# Patient Record
Sex: FEMALE | Race: BLACK OR AFRICAN AMERICAN | Employment: UNEMPLOYED | URBAN - METROPOLITAN AREA
[De-identification: names, ages, dates, MRNs, and addresses within clinical notes are randomized per-mention and may not be internally consistent; named-entity substitution may affect disease eponyms.]

---

## 2019-05-09 ENCOUNTER — HOSPITAL ENCOUNTER (OUTPATIENT)
Dept: LAB | Age: 54
Discharge: HOME | End: 2019-05-09

## 2019-05-09 LAB — XX-LABCORP SPECIMEN COL,LCBCF: NORMAL

## 2019-05-09 PROCEDURE — 99001 SPECIMEN HANDLING PT-LAB: CPT

## 2019-05-22 ENCOUNTER — HOSPITAL ENCOUNTER (OUTPATIENT)
Dept: MAMMOGRAPHY | Age: 54
Discharge: HOME | End: 2019-05-22
Attending: INTERNAL MEDICINE
Payer: MEDICAID

## 2019-05-22 DIAGNOSIS — Z12.31 VISIT FOR SCREENING MAMMOGRAM: ICD-10-CM

## 2019-05-22 PROCEDURE — 77067 SCR MAMMO BI INCL CAD: CPT

## 2019-06-03 ENCOUNTER — HOSPITAL ENCOUNTER (OUTPATIENT)
Dept: MAMMOGRAPHY | Age: 54
Discharge: HOME | End: 2019-06-03
Attending: INTERNAL MEDICINE
Payer: MEDICAID

## 2019-06-03 ENCOUNTER — HOSPITAL ENCOUNTER (OUTPATIENT)
Dept: ULTRASOUND IMAGING | Age: 54
Discharge: HOME | End: 2019-06-03
Attending: INTERNAL MEDICINE
Payer: MEDICAID

## 2019-06-03 DIAGNOSIS — R92.2 INCONCLUSIVE MAMMOGRAM: ICD-10-CM

## 2019-06-03 DIAGNOSIS — R92.8 ABNORMALITY OF LEFT BREAST ON SCREENING MAMMOGRAM: ICD-10-CM

## 2019-06-03 PROCEDURE — 76642 ULTRASOUND BREAST LIMITED: CPT

## 2019-06-03 PROCEDURE — 77061 BREAST TOMOSYNTHESIS UNI: CPT

## 2019-10-07 ENCOUNTER — HOSPITAL ENCOUNTER (OUTPATIENT)
Dept: GENERAL RADIOLOGY | Age: 54
Discharge: HOME | End: 2019-10-07
Payer: MEDICAID

## 2019-10-07 DIAGNOSIS — Z87.09 HISTORY OF ASBESTOSIS: ICD-10-CM

## 2019-10-07 DIAGNOSIS — R05.9 COUGH: ICD-10-CM

## 2019-10-07 PROCEDURE — 71046 X-RAY EXAM CHEST 2 VIEWS: CPT

## 2020-06-10 ENCOUNTER — HOSPITAL ENCOUNTER (OUTPATIENT)
Dept: MAMMOGRAPHY | Age: 55
Discharge: HOME | End: 2020-06-10
Attending: INTERNAL MEDICINE
Payer: MEDICAID

## 2020-06-10 DIAGNOSIS — Z12.31 VISIT FOR SCREENING MAMMOGRAM: ICD-10-CM

## 2020-06-10 PROCEDURE — 77063 BREAST TOMOSYNTHESIS BI: CPT

## 2020-11-27 ENCOUNTER — HOSPITAL ENCOUNTER (OUTPATIENT)
Dept: LAB | Age: 55
Discharge: HOME | End: 2020-11-27

## 2020-11-27 LAB — XX-LABCORP SPECIMEN COL,LCBCF: NORMAL

## 2020-11-27 PROCEDURE — 99001 SPECIMEN HANDLING PT-LAB: CPT

## 2021-03-24 ENCOUNTER — HOSPITAL ENCOUNTER (OUTPATIENT)
Dept: LAB | Age: 56
Discharge: HOME | End: 2021-03-24
Payer: MEDICAID

## 2021-03-24 LAB
ALBUMIN SERPL-MCNC: 3.2 G/DL (ref 3.4–5)
ALBUMIN/GLOB SERPL: 0.8 {RATIO} (ref 0.8–1.7)
ALP SERPL-CCNC: 113 U/L (ref 45–117)
ALT SERPL-CCNC: 54 U/L (ref 13–56)
ANION GAP SERPL CALC-SCNC: 10 MMOL/L (ref 3–18)
AST SERPL-CCNC: 58 U/L (ref 10–38)
BASOPHILS # BLD: 0 K/UL (ref 0–0.1)
BASOPHILS NFR BLD: 1 % (ref 0–2)
BILIRUB SERPL-MCNC: 0.4 MG/DL (ref 0.2–1)
BUN SERPL-MCNC: 7 MG/DL (ref 7–18)
BUN/CREAT SERPL: 9 (ref 12–20)
CALCIUM SERPL-MCNC: 8.5 MG/DL (ref 8.5–10.1)
CHLORIDE SERPL-SCNC: 107 MMOL/L (ref 100–111)
CHOLEST SERPL-MCNC: 112 MG/DL
CO2 SERPL-SCNC: 25 MMOL/L (ref 21–32)
CREAT SERPL-MCNC: 0.74 MG/DL (ref 0.6–1.3)
DIFFERENTIAL METHOD BLD: ABNORMAL
EOSINOPHIL # BLD: 0.1 K/UL (ref 0–0.4)
EOSINOPHIL NFR BLD: 3 % (ref 0–5)
ERYTHROCYTE [DISTWIDTH] IN BLOOD BY AUTOMATED COUNT: 14.2 % (ref 11.6–14.5)
GLOBULIN SER CALC-MCNC: 3.8 G/DL (ref 2–4)
GLUCOSE SERPL-MCNC: 107 MG/DL (ref 74–99)
HCT VFR BLD AUTO: 35.3 % (ref 35–45)
HDLC SERPL-MCNC: 28 MG/DL (ref 40–60)
HDLC SERPL: 4 {RATIO} (ref 0–5)
HGB BLD-MCNC: 11.8 G/DL (ref 12–16)
LDLC SERPL CALC-MCNC: 42.2 MG/DL (ref 0–100)
LIPID PROFILE,FLP: ABNORMAL
LYMPHOCYTES # BLD: 1.1 K/UL (ref 0.9–3.6)
LYMPHOCYTES NFR BLD: 34 % (ref 21–52)
MCH RBC QN AUTO: 28.1 PG (ref 24–34)
MCHC RBC AUTO-ENTMCNC: 33.4 G/DL (ref 31–37)
MCV RBC AUTO: 84 FL (ref 74–97)
MONOCYTES # BLD: 0.6 K/UL (ref 0.05–1.2)
MONOCYTES NFR BLD: 18 % (ref 3–10)
NEUTS SEG # BLD: 1.5 K/UL (ref 1.8–8)
NEUTS SEG NFR BLD: 44 % (ref 40–73)
PLATELET # BLD AUTO: 176 K/UL (ref 135–420)
PMV BLD AUTO: 9.5 FL (ref 9.2–11.8)
POTASSIUM SERPL-SCNC: 4.2 MMOL/L (ref 3.5–5.5)
PROT SERPL-MCNC: 7 G/DL (ref 6.4–8.2)
RBC # BLD AUTO: 4.2 M/UL (ref 4.2–5.3)
SODIUM SERPL-SCNC: 142 MMOL/L (ref 136–145)
TRIGL SERPL-MCNC: 209 MG/DL (ref ?–150)
TSH SERPL DL<=0.05 MIU/L-ACNC: 1 UIU/ML (ref 0.36–3.74)
VLDLC SERPL CALC-MCNC: 41.8 MG/DL
WBC # BLD AUTO: 3.3 K/UL (ref 4.6–13.2)

## 2021-03-24 PROCEDURE — 36415 COLL VENOUS BLD VENIPUNCTURE: CPT

## 2021-03-24 PROCEDURE — 84436 ASSAY OF TOTAL THYROXINE: CPT

## 2021-03-24 PROCEDURE — 80061 LIPID PANEL: CPT

## 2021-03-24 PROCEDURE — 84443 ASSAY THYROID STIM HORMONE: CPT

## 2021-03-24 PROCEDURE — 80053 COMPREHEN METABOLIC PANEL: CPT

## 2021-03-24 PROCEDURE — 85025 COMPLETE CBC W/AUTO DIFF WBC: CPT

## 2021-03-25 LAB — T4 SERPL-MCNC: 7.6 UG/DL (ref 4.8–13.9)

## 2021-04-14 ENCOUNTER — HOSPITAL ENCOUNTER (OUTPATIENT)
Dept: LAB | Age: 56
Discharge: HOME | End: 2021-04-14
Payer: MEDICAID

## 2021-04-14 PROCEDURE — 87521 HEPATITIS C PROBE&RVRS TRNSC: CPT

## 2021-04-14 PROCEDURE — 36415 COLL VENOUS BLD VENIPUNCTURE: CPT

## 2021-04-14 PROCEDURE — 86702 HIV-2 ANTIBODY: CPT

## 2021-04-14 PROCEDURE — 87389 HIV-1 AG W/HIV-1&-2 AB AG IA: CPT

## 2021-04-14 PROCEDURE — 87522 HEPATITIS C REVRS TRNSCRPJ: CPT

## 2021-04-15 LAB
HIV 1 & 2 AB SER-IMP: ABNORMAL
HIV 1+2 AB+HIV1 P24 AG SERPL QL IA: REACTIVE
HIV 2 AB SERPL QL IA: NEGATIVE
HIV1 AB SERPL QL IA: POSITIVE

## 2021-04-16 LAB
HCV GENOTYPE: NORMAL
HCV RNA SERPL NAA+PROBE-ACNC: NORMAL IU/ML
HCV RNA SERPL NAA+PROBE-ACNC: NORMAL IU/ML
HCV RNA SERPL NAA+PROBE-LOG IU: NORMAL LOG10 IU/ML
HCV RNA SERPL NAA+PROBE-LOG IU: NORMAL LOG10 IU/ML
TEST INFORMATION, 550045: NORMAL
TEST INFORMATION, 550045: NORMAL

## 2021-04-17 LAB
HCV RNA SERPL QL NAA+PROBE: NEGATIVE
SERIAL MONITORING: NORMAL

## 2021-06-14 ENCOUNTER — HOSPITAL ENCOUNTER (OUTPATIENT)
Dept: WOMENS IMAGING | Age: 56
Discharge: HOME | End: 2021-06-14
Attending: INTERNAL MEDICINE
Payer: MEDICAID

## 2021-06-14 DIAGNOSIS — Z12.31 ENCOUNTER FOR SCREENING MAMMOGRAM FOR BREAST CANCER: ICD-10-CM

## 2021-06-14 PROCEDURE — 77063 BREAST TOMOSYNTHESIS BI: CPT

## 2021-11-05 ENCOUNTER — TRANSCRIBE ORDER (OUTPATIENT)
Dept: REGISTRATION | Age: 56
End: 2021-11-05

## 2021-11-05 ENCOUNTER — HOSPITAL ENCOUNTER (OUTPATIENT)
Dept: GENERAL RADIOLOGY | Age: 56
Discharge: HOME | End: 2021-11-05
Payer: MEDICAID

## 2021-11-05 DIAGNOSIS — M25.551 RIGHT HIP PAIN: Primary | ICD-10-CM

## 2021-11-05 DIAGNOSIS — M25.551 RIGHT HIP PAIN: ICD-10-CM

## 2021-11-05 PROCEDURE — 73502 X-RAY EXAM HIP UNI 2-3 VIEWS: CPT

## 2022-06-23 ENCOUNTER — HOSPITAL ENCOUNTER (OUTPATIENT)
Dept: LAB | Age: 57
Discharge: HOME | End: 2022-06-23

## 2022-06-23 ENCOUNTER — TRANSCRIBE ORDER (OUTPATIENT)
Dept: SCHEDULING | Age: 57
End: 2022-06-23

## 2022-06-23 DIAGNOSIS — Z12.31 VISIT FOR SCREENING MAMMOGRAM: Primary | ICD-10-CM

## 2022-06-23 LAB — XX-LABCORP SPECIMEN COL,LCBCF: NORMAL

## 2022-06-23 PROCEDURE — 99001 SPECIMEN HANDLING PT-LAB: CPT

## 2022-06-24 ENCOUNTER — HOSPITAL ENCOUNTER (OUTPATIENT)
Dept: WOMENS IMAGING | Age: 57
Discharge: HOME | End: 2022-06-24
Attending: INTERNAL MEDICINE
Payer: MEDICAID

## 2022-06-24 DIAGNOSIS — Z12.31 VISIT FOR SCREENING MAMMOGRAM: ICD-10-CM

## 2022-06-24 PROCEDURE — 77063 BREAST TOMOSYNTHESIS BI: CPT

## 2022-12-21 ENCOUNTER — APPOINTMENT (OUTPATIENT)
Dept: CT IMAGING | Age: 57
DRG: 182 | End: 2022-12-21
Attending: PHYSICIAN ASSISTANT
Payer: MEDICAID

## 2022-12-21 ENCOUNTER — APPOINTMENT (OUTPATIENT)
Dept: VASCULAR SURGERY | Age: 57
DRG: 182 | End: 2022-12-21
Attending: PHYSICIAN ASSISTANT
Payer: MEDICAID

## 2022-12-21 ENCOUNTER — HOSPITAL ENCOUNTER (INPATIENT)
Age: 57
LOS: 4 days | Discharge: HOME | DRG: 182 | End: 2022-12-25
Attending: EMERGENCY MEDICINE | Admitting: STUDENT IN AN ORGANIZED HEALTH CARE EDUCATION/TRAINING PROGRAM
Payer: MEDICAID

## 2022-12-21 DIAGNOSIS — I82.452 ACUTE DEEP VEIN THROMBOSIS (DVT) OF LEFT PERONEAL VEIN (HCC): ICD-10-CM

## 2022-12-21 DIAGNOSIS — I73.9 PAD (PERIPHERAL ARTERY DISEASE) (HCC): ICD-10-CM

## 2022-12-21 DIAGNOSIS — I99.8 LIMB ISCHEMIA: ICD-10-CM

## 2022-12-21 DIAGNOSIS — I99.8 LOWER LIMB ISCHEMIA: Primary | ICD-10-CM

## 2022-12-21 LAB
ANION GAP SERPL CALC-SCNC: 3 MMOL/L (ref 3–18)
APTT PPP: 27 SEC (ref 23–36.4)
BASOPHILS # BLD: 0 K/UL (ref 0–0.1)
BASOPHILS NFR BLD: 1 % (ref 0–2)
BUN SERPL-MCNC: 20 MG/DL (ref 7–18)
BUN/CREAT SERPL: 21 (ref 12–20)
CALCIUM SERPL-MCNC: 9.3 MG/DL (ref 8.5–10.1)
CHLORIDE SERPL-SCNC: 109 MMOL/L (ref 100–111)
CO2 SERPL-SCNC: 27 MMOL/L (ref 21–32)
CREAT SERPL-MCNC: 0.94 MG/DL (ref 0.6–1.3)
DIFFERENTIAL METHOD BLD: ABNORMAL
EOSINOPHIL # BLD: 0.2 K/UL (ref 0–0.4)
EOSINOPHIL NFR BLD: 4 % (ref 0–5)
ERYTHROCYTE [DISTWIDTH] IN BLOOD BY AUTOMATED COUNT: 13.9 % (ref 11.6–14.5)
GLUCOSE SERPL-MCNC: 100 MG/DL (ref 74–99)
HCT VFR BLD AUTO: 39.4 % (ref 35–45)
HGB BLD-MCNC: 12.6 G/DL (ref 12–16)
IMM GRANULOCYTES # BLD AUTO: 0 K/UL (ref 0–0.04)
IMM GRANULOCYTES NFR BLD AUTO: 1 % (ref 0–0.5)
LYMPHOCYTES # BLD: 2.4 K/UL (ref 0.9–3.6)
LYMPHOCYTES NFR BLD: 42 % (ref 21–52)
MCH RBC QN AUTO: 28.1 PG (ref 24–34)
MCHC RBC AUTO-ENTMCNC: 32 G/DL (ref 31–37)
MCV RBC AUTO: 87.8 FL (ref 78–100)
MONOCYTES # BLD: 0.3 K/UL (ref 0.05–1.2)
MONOCYTES NFR BLD: 5 % (ref 3–10)
NEUTS SEG # BLD: 2.7 K/UL (ref 1.8–8)
NEUTS SEG NFR BLD: 47 % (ref 40–73)
NRBC # BLD: 0 K/UL (ref 0–0.01)
NRBC BLD-RTO: 0 PER 100 WBC
PLATELET # BLD AUTO: 261 K/UL (ref 135–420)
PMV BLD AUTO: 9.3 FL (ref 9.2–11.8)
POTASSIUM SERPL-SCNC: 3.9 MMOL/L (ref 3.5–5.5)
RBC # BLD AUTO: 4.49 M/UL (ref 4.2–5.3)
SODIUM SERPL-SCNC: 139 MMOL/L (ref 136–145)
WBC # BLD AUTO: 5.7 K/UL (ref 4.6–13.2)

## 2022-12-21 PROCEDURE — 96366 THER/PROPH/DIAG IV INF ADDON: CPT

## 2022-12-21 PROCEDURE — 96365 THER/PROPH/DIAG IV INF INIT: CPT

## 2022-12-21 PROCEDURE — 75635 CT ANGIO ABDOMINAL ARTERIES: CPT

## 2022-12-21 PROCEDURE — 93971 EXTREMITY STUDY: CPT

## 2022-12-21 PROCEDURE — 80061 LIPID PANEL: CPT

## 2022-12-21 PROCEDURE — 74011250636 HC RX REV CODE- 250/636: Performed by: PHYSICIAN ASSISTANT

## 2022-12-21 PROCEDURE — 99222 1ST HOSP IP/OBS MODERATE 55: CPT | Performed by: STUDENT IN AN ORGANIZED HEALTH CARE EDUCATION/TRAINING PROGRAM

## 2022-12-21 PROCEDURE — 83036 HEMOGLOBIN GLYCOSYLATED A1C: CPT

## 2022-12-21 PROCEDURE — 65270000029 HC RM PRIVATE

## 2022-12-21 PROCEDURE — 85730 THROMBOPLASTIN TIME PARTIAL: CPT

## 2022-12-21 PROCEDURE — 96376 TX/PRO/DX INJ SAME DRUG ADON: CPT

## 2022-12-21 PROCEDURE — 93926 LOWER EXTREMITY STUDY: CPT

## 2022-12-21 PROCEDURE — 99285 EMERGENCY DEPT VISIT HI MDM: CPT

## 2022-12-21 PROCEDURE — 85025 COMPLETE CBC W/AUTO DIFF WBC: CPT

## 2022-12-21 PROCEDURE — 80048 BASIC METABOLIC PNL TOTAL CA: CPT

## 2022-12-21 RX ORDER — HEPARIN SODIUM 10000 [USP'U]/100ML
18-36 INJECTION, SOLUTION INTRAVENOUS
Status: DISCONTINUED | OUTPATIENT
Start: 2022-12-21 | End: 2022-12-23

## 2022-12-21 RX ORDER — HEPARIN SODIUM 1000 [USP'U]/ML
80 INJECTION, SOLUTION INTRAVENOUS; SUBCUTANEOUS ONCE
Status: COMPLETED | OUTPATIENT
Start: 2022-12-21 | End: 2022-12-21

## 2022-12-21 RX ADMIN — HEPARIN SODIUM 7840 UNITS: 1000 INJECTION INTRAVENOUS; SUBCUTANEOUS at 21:22

## 2022-12-21 RX ADMIN — HEPARIN SODIUM 18 UNITS/KG/HR: 10000 INJECTION, SOLUTION INTRAVENOUS at 21:22

## 2022-12-21 NOTE — Clinical Note
Sheath #1: Sheath: inserted. Sheath inserted/placed in the left femoral  artery.  Micropuncture sheath

## 2022-12-21 NOTE — Clinical Note
TRANSFER - IN REPORT:     Verbal report received from: iMchelle Linn RN. Report consisted of patient's Situation, Background, Assessment and   Recommendations(SBAR). Opportunity for questions and clarification was provided. Assessment completed upon patient's arrival to unit and care assumed. Patient transported with a Registered Nurse.

## 2022-12-21 NOTE — Clinical Note
Contrast Dose Calculator:   Patient's age: 62.   Patient's sex: Female. Patient weight (kg) = 98. Creatinine level (mg/dL) = 0.85. Creatinine clearance (mL/min): 113. Contrast concentration (mg/mL) = 300. MACD = 300 mL. Max Contrast dose per Creatinine Cl calculator = 254.25 mL.

## 2022-12-21 NOTE — Clinical Note
Vessel: left iliac, CFA, PFA, SFA, popliteal, PTA, peroneal, SHAQUILLE and dorsalis pedis. Power injection to the artery. Multiple views taken.

## 2022-12-21 NOTE — Clinical Note
Sheath #1: sheath exchanged for INTRODUCER [facility] 6FR MINDA L11CM DIL TIP 35MM GRN TUNGSTEN.

## 2022-12-21 NOTE — Clinical Note
TRANSFER - OUT REPORT:     Verbal report given to: Camron Fair RN. Report consisted of patient's Situation, Background, Assessment and   Recommendations(SBAR). Opportunity for questions and clarification was provided. Patient transported with a Registered Nurse. Patient transported to: holding area.

## 2022-12-21 NOTE — Clinical Note
Power injection to the artery. Single view taken. PSI = 900. Rate of rise = 0.5 sec. Injection rate = 10 mL/sec. Total injected volume = 20 mL.

## 2022-12-22 ENCOUNTER — APPOINTMENT (OUTPATIENT)
Dept: NON INVASIVE DIAGNOSTICS | Age: 57
DRG: 182 | End: 2022-12-22
Attending: STUDENT IN AN ORGANIZED HEALTH CARE EDUCATION/TRAINING PROGRAM
Payer: MEDICAID

## 2022-12-22 ENCOUNTER — APPOINTMENT (OUTPATIENT)
Dept: CT IMAGING | Age: 57
DRG: 182 | End: 2022-12-22
Attending: PHYSICIAN ASSISTANT
Payer: MEDICAID

## 2022-12-22 PROBLEM — I10 PRIMARY HYPERTENSION: Status: ACTIVE | Noted: 2022-12-22

## 2022-12-22 PROBLEM — E66.9 OBESITY (BMI 30-39.9): Status: ACTIVE | Noted: 2022-12-22

## 2022-12-22 PROBLEM — Z72.0 CURRENT TOBACCO USE: Status: ACTIVE | Noted: 2022-12-22

## 2022-12-22 LAB
ABO + RH BLD: NORMAL
APTT PPP: 116.9 SEC (ref 23–36.4)
APTT PPP: 28.2 SEC (ref 23–36.4)
APTT PPP: 75.3 SEC (ref 23–36.4)
BASOPHILS # BLD: 0.1 K/UL (ref 0–0.1)
BASOPHILS NFR BLD: 1 % (ref 0–2)
BLOOD GROUP ANTIBODIES SERPL: NORMAL
CHOLEST SERPL-MCNC: 187 MG/DL
DIFFERENTIAL METHOD BLD: ABNORMAL
ECHO AO ROOT DIAM: 2.9 CM
ECHO AO ROOT INDEX: 1.4 CM/M2
ECHO AV AREA PEAK VELOCITY: 1.4 CM2
ECHO AV AREA VTI: 1.7 CM2
ECHO AV AREA/BSA PEAK VELOCITY: 0.7 CM2/M2
ECHO AV AREA/BSA VTI: 0.8 CM2/M2
ECHO AV MEAN GRADIENT: 7 MMHG
ECHO AV MEAN VELOCITY: 1.3 M/S
ECHO AV PEAK GRADIENT: 17 MMHG
ECHO AV PEAK VELOCITY: 2.1 M/S
ECHO AV VELOCITY RATIO: 0.57
ECHO AV VTI: 36.3 CM
ECHO EST RA PRESSURE: 3 MMHG
ECHO LA VOL 2C: 51 ML (ref 22–52)
ECHO LA VOL 4C: 44 ML (ref 22–52)
ECHO LA VOLUME AREA LENGTH: 55 ML
ECHO LA VOLUME INDEX A2C: 25 ML/M2 (ref 16–34)
ECHO LA VOLUME INDEX A4C: 21 ML/M2 (ref 16–34)
ECHO LA VOLUME INDEX AREA LENGTH: 27 ML/M2 (ref 16–34)
ECHO LV E' LATERAL VELOCITY: 6 CM/S
ECHO LV E' SEPTAL VELOCITY: 4 CM/S
ECHO LV FRACTIONAL SHORTENING: 23 % (ref 28–44)
ECHO LV INTERNAL DIMENSION DIASTOLE INDEX: 2.13 CM/M2
ECHO LV INTERNAL DIMENSION DIASTOLIC: 4.4 CM (ref 3.9–5.3)
ECHO LV INTERNAL DIMENSION SYSTOLIC INDEX: 1.64 CM/M2
ECHO LV INTERNAL DIMENSION SYSTOLIC: 3.4 CM
ECHO LV IVSD: 1 CM (ref 0.6–0.9)
ECHO LV MASS 2D: 147.8 G (ref 67–162)
ECHO LV MASS INDEX 2D: 71.4 G/M2 (ref 43–95)
ECHO LV POSTERIOR WALL DIASTOLIC: 1 CM (ref 0.6–0.9)
ECHO LV RELATIVE WALL THICKNESS RATIO: 0.45
ECHO LVOT AREA: 2.5 CM2
ECHO LVOT AV VTI INDEX: 0.68
ECHO LVOT DIAM: 1.8 CM
ECHO LVOT MEAN GRADIENT: 2 MMHG
ECHO LVOT PEAK GRADIENT: 5 MMHG
ECHO LVOT PEAK VELOCITY: 1.2 M/S
ECHO LVOT STROKE VOLUME INDEX: 30.2 ML/M2
ECHO LVOT SV: 62.6 ML
ECHO LVOT VTI: 24.6 CM
ECHO MV A VELOCITY: 1.08 M/S
ECHO MV E DECELERATION TIME (DT): 179.4 MS
ECHO MV E VELOCITY: 0.86 M/S
ECHO MV E/A RATIO: 0.8
ECHO MV E/E' LATERAL: 14.33
ECHO MV E/E' RATIO (AVERAGED): 17.92
ECHO MV E/E' SEPTAL: 21.5
ECHO RA AREA 4C: 11.6 CM2
ECHO RV BASAL DIMENSION: 3.6 CM
ECHO RV FREE WALL PEAK S': 10 CM/S
ECHO RV TAPSE: 2.3 CM (ref 1.7–?)
EOSINOPHIL # BLD: 0.3 K/UL (ref 0–0.4)
EOSINOPHIL NFR BLD: 4 % (ref 0–5)
ERYTHROCYTE [DISTWIDTH] IN BLOOD BY AUTOMATED COUNT: 14.1 % (ref 11.6–14.5)
EST. AVERAGE GLUCOSE BLD GHB EST-MCNC: 123 MG/DL
HBA1C MFR BLD: 5.9 % (ref 4.2–5.6)
HCT VFR BLD AUTO: 37 % (ref 35–45)
HDLC SERPL-MCNC: 40 MG/DL (ref 40–60)
HDLC SERPL: 4.7 {RATIO} (ref 0–5)
HGB BLD-MCNC: 12 G/DL (ref 12–16)
IMM GRANULOCYTES # BLD AUTO: 0 K/UL (ref 0–0.04)
IMM GRANULOCYTES NFR BLD AUTO: 1 % (ref 0–0.5)
LDLC SERPL CALC-MCNC: 118.2 MG/DL (ref 0–100)
LEFT CFA DIST SYS PSV: 74.4 CM/S
LEFT DIST ATA VELOCITY: 16.6 CM/S
LEFT DIST PTA PSV: 21.5 CM/S
LEFT MID ATA VELOCITY: 16 CM/S
LEFT PERONEAL DIST VELOCITY: 22.6 CM/S
LEFT PERONEAL MID SYS PSV: 25.9 CM/S
LEFT PERONEAL PROX SYS PSV: 19.9 CM/S
LEFT POP A DIST VEL RATIO: 0.96
LEFT POP A MID VEL RATIO: 1.38
LEFT POP A PROX VEL RATIO: 0.94
LEFT POPLITEAL DIST SYS PSV: 32.2 CM/S
LEFT POPLITEAL MID SYS PSV: 33.6 CM/S
LEFT POPLITEAL PROX SYS PSV: 24.3 CM/S
LEFT PROX ATA VELOCITY: 27.5 CM/S
LEFT PROX PFA A PSV: 49.5 CM/S
LEFT PROX PTA PSV: 21.5 CM/S
LEFT PTA MID SYS PSV: 19.3 CM/S
LEFT SFA DIST VEL RATIO: 1.09
LEFT SFA MID VEL RATIO: 0.56
LEFT SFA PROX VEL RATIO: 0.56
LEFT SUPER FEMORAL DIST SYS PSV: 25.9 CM/S
LEFT SUPER FEMORAL MID SYS PSV: 23.7 CM/S
LEFT SUPER FEMORAL PROX SYS PSV: 42 CM/S
LIPID PROFILE,FLP: ABNORMAL
LYMPHOCYTES # BLD: 2.7 K/UL (ref 0.9–3.6)
LYMPHOCYTES NFR BLD: 45 % (ref 21–52)
MCH RBC QN AUTO: 28.2 PG (ref 24–34)
MCHC RBC AUTO-ENTMCNC: 32.4 G/DL (ref 31–37)
MCV RBC AUTO: 87.1 FL (ref 78–100)
MONOCYTES # BLD: 0.3 K/UL (ref 0.05–1.2)
MONOCYTES NFR BLD: 5 % (ref 3–10)
NEUTS SEG # BLD: 2.6 K/UL (ref 1.8–8)
NEUTS SEG NFR BLD: 44 % (ref 40–73)
NRBC # BLD: 0 K/UL (ref 0–0.01)
NRBC BLD-RTO: 0 PER 100 WBC
PLATELET # BLD AUTO: 271 K/UL (ref 135–420)
PMV BLD AUTO: 9.7 FL (ref 9.2–11.8)
RBC # BLD AUTO: 4.25 M/UL (ref 4.2–5.3)
SPECIMEN EXP DATE BLD: NORMAL
TRIGL SERPL-MCNC: 144 MG/DL (ref ?–150)
VLDLC SERPL CALC-MCNC: 28.8 MG/DL
WBC # BLD AUTO: 6 K/UL (ref 4.6–13.2)

## 2022-12-22 PROCEDURE — 74011250636 HC RX REV CODE- 250/636: Performed by: HOSPITALIST

## 2022-12-22 PROCEDURE — 74011250636 HC RX REV CODE- 250/636: Performed by: STUDENT IN AN ORGANIZED HEALTH CARE EDUCATION/TRAINING PROGRAM

## 2022-12-22 PROCEDURE — 74011000250 HC RX REV CODE- 250: Performed by: STUDENT IN AN ORGANIZED HEALTH CARE EDUCATION/TRAINING PROGRAM

## 2022-12-22 PROCEDURE — 74011000636 HC RX REV CODE- 636: Performed by: STUDENT IN AN ORGANIZED HEALTH CARE EDUCATION/TRAINING PROGRAM

## 2022-12-22 PROCEDURE — 74011250637 HC RX REV CODE- 250/637: Performed by: HOSPITALIST

## 2022-12-22 PROCEDURE — 71275 CT ANGIOGRAPHY CHEST: CPT

## 2022-12-22 PROCEDURE — 93306 TTE W/DOPPLER COMPLETE: CPT

## 2022-12-22 PROCEDURE — 85730 THROMBOPLASTIN TIME PARTIAL: CPT

## 2022-12-22 PROCEDURE — 74011250637 HC RX REV CODE- 250/637: Performed by: STUDENT IN AN ORGANIZED HEALTH CARE EDUCATION/TRAINING PROGRAM

## 2022-12-22 PROCEDURE — 85025 COMPLETE CBC W/AUTO DIFF WBC: CPT

## 2022-12-22 PROCEDURE — 86900 BLOOD TYPING SEROLOGIC ABO: CPT

## 2022-12-22 PROCEDURE — 65270000029 HC RM PRIVATE

## 2022-12-22 PROCEDURE — 99232 SBSQ HOSP IP/OBS MODERATE 35: CPT | Performed by: HOSPITALIST

## 2022-12-22 RX ORDER — HYDRALAZINE HYDROCHLORIDE 20 MG/ML
10 INJECTION INTRAMUSCULAR; INTRAVENOUS
Status: DISCONTINUED | OUTPATIENT
Start: 2022-12-22 | End: 2022-12-25 | Stop reason: HOSPADM

## 2022-12-22 RX ORDER — AMLODIPINE BESYLATE 10 MG/1
10 TABLET ORAL DAILY
Status: DISCONTINUED | OUTPATIENT
Start: 2022-12-22 | End: 2022-12-25 | Stop reason: HOSPADM

## 2022-12-22 RX ORDER — ONDANSETRON 4 MG/1
4 TABLET, ORALLY DISINTEGRATING ORAL
Status: DISCONTINUED | OUTPATIENT
Start: 2022-12-22 | End: 2022-12-25 | Stop reason: HOSPADM

## 2022-12-22 RX ORDER — SODIUM CHLORIDE 9 MG/ML
10 INJECTION INTRAMUSCULAR; INTRAVENOUS; SUBCUTANEOUS
Status: ACTIVE | OUTPATIENT
Start: 2022-12-22 | End: 2022-12-22

## 2022-12-22 RX ORDER — SODIUM CHLORIDE 0.9 % (FLUSH) 0.9 %
5-40 SYRINGE (ML) INJECTION EVERY 8 HOURS
Status: DISCONTINUED | OUTPATIENT
Start: 2022-12-22 | End: 2022-12-25 | Stop reason: HOSPADM

## 2022-12-22 RX ORDER — ONDANSETRON 2 MG/ML
4 INJECTION INTRAMUSCULAR; INTRAVENOUS
Status: DISCONTINUED | OUTPATIENT
Start: 2022-12-22 | End: 2022-12-25 | Stop reason: HOSPADM

## 2022-12-22 RX ORDER — SODIUM CHLORIDE 0.9 % (FLUSH) 0.9 %
5-40 SYRINGE (ML) INJECTION AS NEEDED
Status: DISCONTINUED | OUTPATIENT
Start: 2022-12-22 | End: 2022-12-25 | Stop reason: HOSPADM

## 2022-12-22 RX ORDER — ACETAMINOPHEN 325 MG/1
650 TABLET ORAL
Status: DISCONTINUED | OUTPATIENT
Start: 2022-12-22 | End: 2022-12-25 | Stop reason: HOSPADM

## 2022-12-22 RX ORDER — AMLODIPINE BESYLATE 5 MG/1
5 TABLET ORAL DAILY
COMMUNITY
Start: 2022-12-14

## 2022-12-22 RX ORDER — MORPHINE SULFATE 2 MG/ML
2 INJECTION, SOLUTION INTRAMUSCULAR; INTRAVENOUS ONCE
Status: COMPLETED | OUTPATIENT
Start: 2022-12-22 | End: 2022-12-22

## 2022-12-22 RX ORDER — ASPIRIN 325 MG
1 TABLET, DELAYED RELEASE (ENTERIC COATED) ORAL DAILY
COMMUNITY
Start: 2022-10-16

## 2022-12-22 RX ORDER — POLYETHYLENE GLYCOL 3350 17 G/17G
17 POWDER, FOR SOLUTION ORAL DAILY PRN
Status: DISCONTINUED | OUTPATIENT
Start: 2022-12-22 | End: 2022-12-25 | Stop reason: HOSPADM

## 2022-12-22 RX ORDER — AMLODIPINE BESYLATE 5 MG/1
5 TABLET ORAL DAILY
Status: DISCONTINUED | OUTPATIENT
Start: 2022-12-22 | End: 2022-12-22

## 2022-12-22 RX ORDER — ABACAVIR SULFATE, DOLUTEGRAVIR SODIUM, LAMIVUDINE 600; 50; 300 MG/1; MG/1; MG/1
1 TABLET, FILM COATED ORAL DAILY
COMMUNITY
Start: 2022-10-31

## 2022-12-22 RX ORDER — POTASSIUM CHLORIDE 750 MG/1
10 TABLET, EXTENDED RELEASE ORAL DAILY
Status: DISCONTINUED | OUTPATIENT
Start: 2022-12-22 | End: 2022-12-25 | Stop reason: HOSPADM

## 2022-12-22 RX ORDER — ACETAMINOPHEN 500 MG
650 TABLET ORAL
COMMUNITY
Start: 2022-10-31

## 2022-12-22 RX ORDER — POTASSIUM CHLORIDE 750 MG/1
10 TABLET, FILM COATED, EXTENDED RELEASE ORAL DAILY
COMMUNITY
Start: 2022-10-16

## 2022-12-22 RX ORDER — HEPARIN SODIUM 1000 [USP'U]/ML
80 INJECTION, SOLUTION INTRAVENOUS; SUBCUTANEOUS ONCE
Status: COMPLETED | OUTPATIENT
Start: 2022-12-22 | End: 2022-12-22

## 2022-12-22 RX ADMIN — ACETAMINOPHEN 650 MG: 325 TABLET, FILM COATED ORAL at 21:15

## 2022-12-22 RX ADMIN — AMLODIPINE BESYLATE 10 MG: 10 TABLET ORAL at 09:26

## 2022-12-22 RX ADMIN — ABACAVIR SULFATE, DOLUTEGRAVIR SODIUM, LAMIVUDINE 1 TABLET: 600; 50; 300 TABLET, FILM COATED ORAL at 20:14

## 2022-12-22 RX ADMIN — SODIUM CHLORIDE, PRESERVATIVE FREE 10 ML: 5 INJECTION INTRAVENOUS at 14:00

## 2022-12-22 RX ADMIN — Medication 50000 UNITS: at 20:14

## 2022-12-22 RX ADMIN — ACETAMINOPHEN 650 MG: 325 TABLET, FILM COATED ORAL at 09:26

## 2022-12-22 RX ADMIN — POTASSIUM CHLORIDE 10 MEQ: 750 TABLET, EXTENDED RELEASE ORAL at 20:14

## 2022-12-22 RX ADMIN — MORPHINE SULFATE 2 MG: 2 INJECTION, SOLUTION INTRAMUSCULAR; INTRAVENOUS at 04:23

## 2022-12-22 RX ADMIN — SODIUM CHLORIDE, PRESERVATIVE FREE 10 ML: 5 INJECTION INTRAVENOUS at 00:30

## 2022-12-22 RX ADMIN — SODIUM CHLORIDE, PRESERVATIVE FREE 10 ML: 5 INJECTION INTRAVENOUS at 05:46

## 2022-12-22 RX ADMIN — HEPARIN SODIUM 7840 UNITS: 1000 INJECTION INTRAVENOUS; SUBCUTANEOUS at 18:30

## 2022-12-22 RX ADMIN — IOPAMIDOL 100 ML: 755 INJECTION, SOLUTION INTRAVENOUS at 00:03

## 2022-12-22 NOTE — ED NOTES
9:30 PM Assumed care of the pt at this time. Discussed with TITO Cade concerning patient Brayan Curtis, standard discussion of reason for visit, HPI, ROS, PE, and current results available. Recommendation for obtaining pending labs followed by consultation again with the hospitalist for confirmation of admission orders/bed placement. Johana Gamez PA-C     11:59 PM labs essentially unremarkable, echo and CTA imaging ordered per vascular surgery request. Hospitalist on call Dr. Juan Savage consulted again and recommends admission to medical floor. Admission orders placed. Johana Gamez PA-C     Disposition: admitted    Dictation disclaimer:  Please note that this dictation was completed with [employer], the computer voice recognition software. Quite often unanticipated grammatical, syntax, homophones, and other interpretive errors are inadvertently transcribed by the computer software. Please disregard these errors. Please excuse any errors that have escaped final proofreading.

## 2022-12-22 NOTE — CONSULTS
Vascular Surgery Consult      Reason for consultation: Left lower extremity pain and numbness-PAD and left peroneal vein DVT  Drug allergies  Patient Active Problem List   Diagnosis Code    Limb ischemia I99.8    Current tobacco use Z72.0    Primary hypertension I10    Obesity (BMI 30-39. 9) E66.9       HPI:  The patient is a 51-year-old -American lady, with history of HIV, on antiretroviral therapy, who presented to the ED last night, with a 3-week history of sudden onset of pain in the entire left lower extremity, from the hip down to the leg, with difficulty walking, numbness of her toes. Initially she thought it was arthritis. However as it was not getting better, she presented to the ED. The patient denies any trauma or other preceding factors. She also has significant pain in her left calf on walking. The patient is a poor historian-denies symptoms suggestive of definitive ischemic rest pain. Denies diabetes or peripheral sensory neuropathy. Denies any similar symptoms on the right lower extremity. Mild improvement in the symptoms, after initiation of heparin and with bedrest.  The patient had a left lower extremity venous duplex ultrasound yesterday, that revealed acute left peroneal vein DVT, along with no flow in the arteries supplying the left toes. She has partial numbness of the left foot, along with decreased movement    She smokes about 4 to 5 cigarettes a day. Has a couple of beers on weekends. Denies marijuana or drug abuse. She lives alone. She works as a CNA in a house. Her sister is her next of kin. She has 22-year-old son. Past Medical History:   Diagnosis Date    Hypertension     Tobacco abuse      History reviewed. No pertinent surgical history.       Family History   Problem Relation Age of Onset    Breast Cancer Mother     No Known Problems Sister          No Known Allergies        Home Medications:     Prior to Admission medications    Medication Sig Start Date End Date Taking? Authorizing Provider   acetaminophen (TYLENOL) 500 mg tablet Take 650 mg by mouth every six (6) hours as needed. 10/31/22  Yes Provider, Historical   amLODIPine (NORVASC) 5 mg tablet Take 5 mg by mouth daily. 12/14/22  Yes Provider, Historical   cholecalciferol (VITAMIN D3) (50,000 UNITS /1250 MCG) capsule Take 1 Capsule by mouth daily. 10/16/22  Yes Provider, Historical   potassium chloride SR (KLOR-CON 10) 10 mEq tablet Take 10 mEq by mouth daily. 10/16/22  Yes Provider, Historical   Triumeq tablet Take 1 Tablet by mouth daily. 10/31/22   Provider, Historical   methocarbamol (ROBAXIN) 500 mg tablet Take 1 Tab by mouth four (4) times daily. 10/25/12   TITO Robertson       Review of Systems:     Constitutional: Denies fever, chills, loss of appetite or weight  HENT: Denies headaches, vision changes, hearing problems or dysphagia  Respiratory: Denies cough, chest pain or expectoration  Cardiovascular: Denies palpitations, irregular heart rate or previous history of claudication or ischemic rest pain of the lower extremity  Abdomen: Denies nausea, vomiting or diarrhea  Hematological: Denies abnormal bruising or bleeding. Denies clotting disorders. : Denies dysuria or hematuria  Musculoskeletal: Denies any abnormal aches or pains in the joints of the muscles. Neurological: Denies any signs or symptoms suggestive of TIA or stroke  Psychological: Denies anxiety or depression  Skin: Has leukoderma changes around the right eye which she says - she woke up with suddenly-is not on any medication for that    Physical Assessment:   Blood pressure (!) 142/73, pulse 82, temperature 97.7 °F (36.5 °C), resp. rate 16, height 5' 6\" (1.676 m), weight 216 lb (98 kg), SpO2 95 %. On examination:  Constitutional: Moderately obese lady, not in acute distress. Awake alert and appropriately responsive. HENT:Leukoderma changes around the right eye.   Atraumatic, normocephalic, normal hearing, trachea midline  Eyes: Mild pallor, no icterus  Respiratory: Bilateral equal air entry, no crepitations or rhonchi  Cardiovascular: Regular rate and rhythm, no murmurs, normal heart sounds    Peripheral vascular: Bilateral radial pulses +2 palpable. No carotid bruits. Bilateral femoral pulses are difficult to feel. Strong Doppler signals in the right DP and PT. Faint monophasic left posterior tibial signal.  No signals in the left DP. The left foot is mildly cooler than the right side. Normal sensation in the left leg to the ankle. Decreased sensation in the entire left foot especially on the plantar aspect. Able to move her toes. However mildly decreased on motor power in the left foot. Mild tenderness of the left calf. The left calf is otherwise soft. Abdomen: Obese, soft, nontender, no obvious masses or abnormal pulsations  Skin: No abnormal rash, bruising or wounds  Musculoskeletal: No gross deformities  Neurological: Except for the numbness of the left foot, and mild decreased motor function of the left foot, grossly nonfocal exam.  Awake alert oriented x3  Psychological: Cooperative, normal mood and affect     All labs and imaging from current admission reviewed and discussed with the patient    Basic Metabolic Profile      Lab Results   Component Value Date     12/21/2022    CO2 27 12/21/2022    BUN 20 (H) 12/21/2022       PVL: Lower extremity arterial and venous duplex: Acute left peroneal vein DVT, no flow in the left toes on PPG. Monophasic Doppler waveforms in the left distal common femoral, profunda femoris, proximal superficial femoral, middle superficial femoral, distal superficial femoral, proximal popliteal, distal popliteal, anterior tibial, tibial/peroneal trunk, posterior tibial, peroneal and dorsalis pedis artery. CT angiogram chest, abdomen and  lower extremities:  1. Moderate stenosis left subclavian artery.   2.  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 Poststenotic dilation suggests hemodynamic significance. 3.  At least moderate stenosis peripheral SMA. 4.  Severe stenosis right common iliac, regions of internal iliac. Diminutive  flow external iliac. 5.  Severe stenosis left common iliac, internal iliac and external iliac. 6.  Mild emphysema. 7.  8 mm pulmonary density right peribronchial upper lobe is similar to  increased since 2009. Similarity over time is reassuring. Recommend 3-6 month  follow-up chest CT. Unchanged reticulation left upper lobe. 8.  Mild diverticulosis. 1.  Right lower extremity: Diminutive flow in the superficial femoral artery and  distally. Regions of severe stenosis/complete occlusion superficial femoral  artery. Diminutive but patent peripheral flow. 2.  Left lower extremity: Diminutive flow in the superficial femoral artery and  distally. Regions of occlusion superficial femoral artery, popliteal. Anterior  tibial is severely diminutive, and not well opacified at the foot. Significant  stenosis tibioperoneal trunk. Diminutive but patent posterior tibial and  peroneal arteries. Impression:     Peripheral arterial disease bilateral lower extremities, with multilevel arterial occlusive disease, with acute worsening of the left lower extremity ischemia, with numbness of the left foot, decreased motor function,-critical limb ischemia, along with acute left peroneal vein DVT. Although she has peripheral arterial disease, she also has acute left peroneal vein DVT, and given her somewhat confusing history would rule out other causes of arterial and venous thromboembolism/hypercoagulable disorders involving both arteries and veins    The patient has HIV and is on antiretroviral therapy. She is also a smoker. Plan:     1. Smoking cessation advised and emphasized  2. Continue anticoagulation with intravenous heparin drip  3.   Plan for bilateral lower extremity arteriograms, and possible endovascular intervention on 12/23/2022.  4.  Echocardiogram Pending    Impression and plan discussed with the patient. Options of treatment were discussed including conservative management with anticoagulation versus intervention. She agrees to undergo the lower extremity arteriograms with intervention for limb salvage.

## 2022-12-22 NOTE — PROGRESS NOTES
[facility] Hospitalist Group  Progress Note    Patient: Pam Burr Age: 62 y.o. : 1965 MR#: [id number] SSN: [id number]  Date/Time: 2022     Subjective:     Patient seen and evaluated, sitting up in recliner, no acute distress. 68-year-old female with a past medical history of tobacco use, hypertension presents to the emergency room secondary to left leg pain. ER evaluation-patient noted to have acute left lower extremity ischemia, vascular surgery consulted, recommended heparin GTT. Patient admitted to the hospital for further evaluation. Assessment/Plan:     Acute left lower extremity ischemia-vascular surgery consulted, continue heparin GTT. Plan for surgical treatment in a.m. History of hypertension-resume home medications  History of HIV-continue Triumeq  DVT prophylaxis-Heparin GTT  Full code                Adin Ledbetter MD  22      Case discussed with:  [x]Patient  []Family  [x]Nursing  []Case Management  DVT Prophylaxis:  []Lovenox  []Hep SQ  []SCDs  []Coumadin   [x]On Heparin gtt    Objective:   VS: Visit Vitals  BP (!) 140/109   Pulse 81   Temp 97.7 °F (36.5 °C)   Resp 23   Ht 5' 6\" (1.676 m)   Wt 98 kg (216 lb)   SpO2 95%   BMI 34.86 kg/m²      Tmax/24hrs: Temp (24hrs), Av.7 °F (36.5 °C), Min:97.7 °F (36.5 °C), Max:97.7 °F (36.5 °C)  IOBRIEF  Intake/Output Summary (Last 24 hours) at 2022 1616  Last data filed at 2022 1240  Gross per 24 hour   Intake 252.81 ml   Output --   Net 252.81 ml       General:  Alert, cooperative, no acute distress    Pulmonary:  CTA Bilaterally. No Wheezing/Rhonchi/Rales. Cardiovascular: Regular rate and Rhythm. GI:  Soft, Non distended, Non tender. + Bowel sounds. Extremities: Left leg pain and decreased pulses left lower extremity  Neurologic: Alert and oriented X 3. No acute neuro deficits.   Additional:    Medications:   Current Facility-Administered Medications   Medication Dose Route Frequency    cholecalciferol (VITAMIN D3) wafer 50,000 Units  50,000 Units Oral Q7D    potassium chloride SR (KLOR-CON 10) tablet 10 mEq  10 mEq Oral DAILY    sodium chloride (NS) flush 5-40 mL  5-40 mL IntraVENous Q8H    sodium chloride (NS) flush 5-40 mL  5-40 mL IntraVENous PRN    acetaminophen (TYLENOL) tablet 650 mg  650 mg Oral Q6H PRN    Or    acetaminophen (TYLENOL) suppository 650 mg  650 mg Rectal Q6H PRN    polyethylene glycol (MIRALAX) packet 17 g  17 g Oral DAILY PRN    ondansetron (ZOFRAN ODT) tablet 4 mg  4 mg Oral Q8H PRN    Or    ondansetron (ZOFRAN) injection 4 mg  4 mg IntraVENous Q6H PRN    amLODIPine (NORVASC) tablet 10 mg  10 mg Oral DAILY    0.9% NaCl bacteriostatic (NORMAL SALINE) 0.9 % injection 10 mL  10 mL IntraVENous CARD ONCE    heparin (porcine) 25,000 units in 0.45% saline 250 ml infusion  18-36 Units/kg/hr IntraVENous TITRATE     Current Outpatient Medications   Medication Sig    acetaminophen (TYLENOL) 500 mg tablet Take 650 mg by mouth every six (6) hours as needed. amLODIPine (NORVASC) 5 mg tablet Take 5 mg by mouth daily. cholecalciferol (VITAMIN D3) (50,000 UNITS /1250 MCG) capsule Take 1 Capsule by mouth daily. potassium chloride SR (KLOR-CON 10) 10 mEq tablet Take 10 mEq by mouth daily. Triumeq tablet Take 1 Tablet by mouth daily. methocarbamol (ROBAXIN) 500 mg tablet Take 1 Tab by mouth four (4) times daily. Imaging:   XR Results (most recent):  Results from Hospital Encounter encounter on 11/05/21    XR HIP RT W OR WO PELV 2-3 VWS    Narrative  EXAM: HIP TWO VIEWS RIGHT    CLINICAL HISTORY/INDICATION: Right hip pain    COMPARISON: None. TECHNIQUE: AP pelvis and frog view    FINDINGS:    The bony pelvic ring is intact. The right hip joint space is unremarkable. There  is no evidence of fracture or dislocation. Mineralization is normal.    Impression  Negative hip.        CT Results (most recent):  Results from [facility] [place] on 12/21/22    CTA CHEST ABD PELV W CONT    Narrative  CTA Chest, Abdomen And Pelvis With Enhancement    INDICATION: Left foot numbness. 3 weeks of tingling, paresthesias, numbness to  left lower extremity digits, worse with weightbearing, smoking history,  hypertension. TECHNIQUE: Axial images obtained from the thoracic inlet to the level of the  pubic symphysis following the uneventful administration of intravenous contrast.  Imaging performed during maximum aortic enhancement and is therefore suboptimal  for evaluating solid organs and bowel. 3-D imaging performed on a separate  workstation. All CT scans at this facility are performed using dose optimization technique as  appropriate to a performed exam, to include automated exposure control,  adjustment of the mA and/or kV according to patient size (including appropriate  matching first site-specific examinations), or use of iterative reconstruction  technique. COMPARISON: 4/30/2009. Thyroid: Unremarkable. Heart: No effusion. Vessels: Pulmonary arteries are patent. Lymph Nodes: Unremarkable. Lung: Mild emphysema. 0.9 x 0.7 cm (0.8 cm average) density in the right  peribronchial upper lobe (series 2, image 42) is similar to increased since  2009. There is unchanged reticulation in the periphery of the left upper lobe  (72). Pleura: Unremarkable. ABDOMEN FINDINGS:  There is suboptimal evaluation of visceral organs secondary to timing of the  exam.    Liver: Unremarkable. Spleen: Unremarkable. Pancreas: Unremarkable. Biliary: Unremarkable. Bowel: Mild diverticulosis. Peritoneum/ Retroperitoneum: Unremarkable. Lymph Nodes: Unremarkable. Adrenal Glands: Bilateral thickening without nodularity. Kidneys: Scarring right upper pole. PELVIS FINDINGS:    Bladder/ Pelvic Organs: Unremarkable. Bones/Soft tissues: Lumbar facet hypertrophy and arthropathy. SI joint  degenerative changes. Sclerosis at the left pubic symphysis.  Degenerative disc  disease. AORTA FINDINGS:  Thoracic aorta is normal in caliber and without acute aortic pathology. 3 vessel  arch anatomy. Moderate stenosis of the left subclavian artery. Mild aortic wall  calcifications greater in the infrarenal aorta. 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 There is mild poststenotic  dilation. Mild SMA stenosis. There is peripheral SMA calcification with at least  moderate stenosis. Mild stenosis left renal artery. Right renal artery is  patent. Severe stenosis right common iliac. Regions of severe stenosis right  internal iliac. Diminutive flow within the right external iliac artery. Severe  stenosis peripheral left common iliac. Severe stenosis origin left external  iliac. Severe stenosis left internal iliac. See dedicated runoff CT for lower  extremity vascular findings. Impression  See separate dedicated CT runoff report. 1.  Moderate stenosis left subclavian artery. 2.  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 Poststenotic dilation suggests hemodynamic significance. 3.  At least moderate stenosis peripheral SMA. 4.  Severe stenosis right common iliac, regions of internal iliac. Diminutive  flow external iliac. 5.  Severe stenosis left common iliac, internal iliac and external iliac. 6.  Mild emphysema. 7.  8 mm pulmonary density right peribronchial upper lobe is similar to  increased since 2009. Similarity over time is reassuring. Recommend 3-6 month  follow-up chest CT. Unchanged reticulation left upper lobe. 8.  Mild diverticulosis. Preliminary report provided by Dr. Larissa Mcbride on 12/22/2022 at [facility] hours prior to  3-D image availability. 12/21/22    ECHO ADULT COMPLETE 12/22/2022 12/22/2022    Interpretation Summary    Left Ventricle: Normal left ventricular systolic function with a visually estimated EF of 60 - 65%. Left ventricle size is normal. LVIDd is 4.4 cm.  Mildly increased wall thickness. Findings consistent with mild concentric hypertrophy. Normal wall motion. Indeterminate diastolic function. Right Ventricle: Right ventricle size is normal. Normal systolic function. TAPSE is 2.3 cm. Tricuspid Valve: Unable to assess RVSP due to inadequate or insignificant tricuspid regurgitation. Signed by: Victoria Duran DO on 12/22/2022 12:35 PM       MRI Results (most recent):  No results found for this or any previous visit.         Labs:    Recent Results (from the past 48 hour(s))   DUPLEX LOWER EXT ARTERY LEFT    Collection Time: 12/21/22  7:44 PM   Result Value Ref Range    Left CFA dist sys PSV 74.4 cm/s    Left Prox PFA A PSV 49.5 cm/s    Left super femoral dist sys PSV 25.9 cm/s    Left super femoral mid sys PSV 23.7 cm/s    Left super femoral prox sys PSV 42.0 cm/s    Left popliteal dist sys PSV 32.2 cm/s    Left popliteal mid sys PSV 33.6 cm/s    Left popliteal prox sys PSV 24.3 cm/s    Left Dist PTA PSV 21.5 cm/s    Left mid PTA sys PSV 19.3 cm/s    Left Prox PTA PSV 21.5 cm/s    Left Dist Peroneal Velocity 22.6 cm/s    Left mid peroneal sys PSV 25.9 cm/s    Left prox peroneal sys PSV 19.9 cm/s    Left Dist SHAQUILLE Velocity 16.6 cm/s    Left Mid SHAQUILLE Velocity 16.0 cm/s    Left Prox SHAQUILLE Velocity 27.5 cm/s    Left SFA Prox Ronaldo Ratio 0.56     Left SFA Mid Ronaldo Ratio 0.56     Left SFA Dist Ronaldo Ratio 1.09     Left Pop A Prox Ronaldo Ratio 0.94     Left Pop A Mid Ronaldo Ratio 1.38     Left Pop A Dist Ronaldo Ratio 0.96    CBC WITH AUTOMATED DIFF    Collection Time: 12/21/22  9:05 PM   Result Value Ref Range    WBC 5.7 4.6 - 13.2 K/uL    RBC 4.49 4.20 - 5.30 M/uL    HGB 12.6 12.0 - 16.0 g/dL    HCT 39.4 35.0 - 45.0 %    MCV 87.8 78.0 - 100.0 FL    MCH 28.1 24.0 - 34.0 PG    MCHC 32.0 31.0 - 37.0 g/dL    RDW 13.9 11.6 - 14.5 %    PLATELET 428 493 - 127 K/uL    MPV 9.3 9.2 - 11.8 FL    NRBC 0.0 0  WBC    ABSOLUTE NRBC 0.00 0.00 - 0.01 K/uL    NEUTROPHILS 47 40 - 73 %    LYMPHOCYTES 42 21 - 52 %    MONOCYTES 5 3 - 10 %    EOSINOPHILS 4 0 - 5 %    BASOPHILS 1 0 - 2 %    IMMATURE GRANULOCYTES 1 (H) 0.0 - 0.5 %    ABS. NEUTROPHILS 2.7 1.8 - 8.0 K/UL    ABS. LYMPHOCYTES 2.4 0.9 - 3.6 K/UL    ABS. MONOCYTES 0.3 0.05 - 1.2 K/UL    ABS. EOSINOPHILS 0.2 0.0 - 0.4 K/UL    ABS. BASOPHILS 0.0 0.0 - 0.1 K/UL    ABS. IMM.  GRANS. 0.0 0.00 - 0.04 K/UL    DF AUTOMATED     PTT    Collection Time: 12/21/22  9:05 PM   Result Value Ref Range    aPTT 27.0 23.0 - 57.3 SEC   METABOLIC PANEL, BASIC    Collection Time: 12/21/22  9:05 PM   Result Value Ref Range    Sodium 139 136 - 145 mmol/L    Potassium 3.9 3.5 - 5.5 mmol/L    Chloride 109 100 - 111 mmol/L    CO2 27 21 - 32 mmol/L    Anion gap 3 3.0 - 18 mmol/L    Glucose 100 (H) 74 - 99 mg/dL    BUN 20 (H) 7.0 - 18 MG/DL    Creatinine 0.94 0.6 - 1.3 MG/DL    BUN/Creatinine ratio 21 (H) 12 - 20      eGFR >60 >60 ml/min/1.73m2    Calcium 9.3 8.5 - 10.1 MG/DL   LIPID PANEL    Collection Time: 12/21/22  9:05 PM   Result Value Ref Range    LIPID PROFILE          Cholesterol, total 187 <200 MG/DL    Triglyceride 144 <150 MG/DL    HDL Cholesterol 40 40 - 60 MG/DL    LDL, calculated 118.2 (H) 0 - 100 MG/DL    VLDL, calculated 28.8 MG/DL    CHOL/HDL Ratio 4.7 0 - 5.0     HEMOGLOBIN A1C WITH EAG    Collection Time: 12/21/22  9:05 PM   Result Value Ref Range    Hemoglobin A1c 5.9 (H) 4.2 - 5.6 %    Est. average glucose 123 mg/dL   PTT    Collection Time: 12/22/22  3:45 AM   Result Value Ref Range    aPTT 116.9 (H) 23.0 - 36.4 SEC   CBC WITH AUTOMATED DIFF    Collection Time: 12/22/22  3:45 AM   Result Value Ref Range    WBC 6.0 4.6 - 13.2 K/uL    RBC 4.25 4.20 - 5.30 M/uL    HGB 12.0 12.0 - 16.0 g/dL    HCT 37.0 35.0 - 45.0 %    MCV 87.1 78.0 - 100.0 FL    MCH 28.2 24.0 - 34.0 PG    MCHC 32.4 31.0 - 37.0 g/dL    RDW 14.1 11.6 - 14.5 %    PLATELET 978 898 - 454 K/uL    MPV 9.7 9.2 - 11.8 FL    NRBC 0.0 0  WBC    ABSOLUTE NRBC 0.00 0.00 - 0.01 K/uL    NEUTROPHILS 44 40 - 73 % LYMPHOCYTES 45 21 - 52 %    MONOCYTES 5 3 - 10 %    EOSINOPHILS 4 0 - 5 %    BASOPHILS 1 0 - 2 %    IMMATURE GRANULOCYTES 1 (H) 0.0 - 0.5 %    ABS. NEUTROPHILS 2.6 1.8 - 8.0 K/UL    ABS. LYMPHOCYTES 2.7 0.9 - 3.6 K/UL    ABS. MONOCYTES 0.3 0.05 - 1.2 K/UL    ABS. EOSINOPHILS 0.3 0.0 - 0.4 K/UL    ABS. BASOPHILS 0.1 0.0 - 0.1 K/UL    ABS. IMM.  GRANS. 0.0 0.00 - 0.04 K/UL    DF AUTOMATED     ECHO ADULT COMPLETE    Collection Time: 12/22/22  8:58 AM   Result Value Ref Range    IVSd 1.0 (A) 0.6 - 0.9 cm    LVIDd 4.4 3.9 - 5.3 cm    LVIDs 3.4 cm    LVOT Diameter 1.8 cm    LVPWd 1.0 (A) 0.6 - 0.9 cm    LVOT Peak Gradient 5 mmHg    LVOT Mean Gradient 2 mmHg    LVOT SV 62.6 ml    LVOT Peak Velocity 1.2 m/s    LVOT VTI 24.6 cm    RV Free Wall Peak S' 10 cm/s    LA Volume A/L 55 mL    LA Volume 2C 51 22 - 52 mL    LA Volume 4C 44 22 - 52 mL    AV Area by Peak Velocity 1.4 cm2    AV Area by VTI 1.7 cm2    AV Peak Gradient 17 mmHg    AV Mean Gradient 7 mmHg    AV Peak Velocity 2.1 m/s    AV Mean Velocity 1.3 m/s    AV VTI 36.3 cm    MV A Velocity 1.08 m/s    MV E Wave Deceleration Time 179.4 ms    MV E Velocity 0.86 m/s    LV E' Lateral Velocity 6 cm/s    LV E' Septal Velocity 4 cm/s    TAPSE 2.3 1.7 cm    Aortic Root 2.9 cm    Fractional Shortening 2D 23 28 - 44 %    LVIDd Index 2.13 cm/m2    LVIDs Index 1.64 cm/m2    LV RWT Ratio 0.45     LV Mass 2D 147.8 67 - 162 g    LV Mass 2D Index 71.4 43 - 95 g/m2    MV E/A 0.80     E/E' Ratio (Averaged) 17.92     E/E' Lateral 14.33     E/E' Septal 21.50     LA Volume Index A/L 27 16 - 34 mL/m2    LVOT Stroke Volume Index 30.2 mL/m2    LVOT Area 2.5 cm2    LA Volume Index 2C 25 16 - 34 mL/m2    LA Volume Index 4C 21 16 - 34 mL/m2    Ao Root Index 1.40 cm/m2    AV Velocity Ratio 0.57     LVOT:AV VTI Index 0.68     BEVERLY/BSA VTI 0.8 cm2/m2    BEVERLY/BSA Peak Velocity 0.7 cm2/m2    RV Basal Dimension 3.6 cm    RA Area 4C 11.6 cm2    Est. RA Pressure 3 mmHg   PTT    Collection Time: 12/22/22 9:55 AM   Result Value Ref Range    aPTT 75.3 (H) 23.0 - 36.4 SEC   TYPE & SCREEN    Collection Time: 12/22/22  1:38 PM   Result Value Ref Range    Crossmatch Expiration 12/25/2022,1869     ABO/Rh(D) Renita Ibanez POSITIVE     Antibody screen NEG    PTT    Collection Time: 12/22/22  2:42 PM   Result Value Ref Range    aPTT 28.2 23.0 - 36.4 SEC       Signed By: Kai Walters MD     December 22, 2022      I spent 25 minutes with the patient in face-to-face consultation, of which greater than 50% was spent in counseling and coordination of care as described above    Disclaimer: Sections of this note are dictated using utilizing voice recognition software. Minor typographical errors may be present. If questions arise, please do not hesitate to contact me or call our department.

## 2022-12-22 NOTE — H&P
History & Physical    Patient: Wily Washington MRN: [id number]  CSN: [id number]    YOB: 1965  Age: 62 y.o. Sex: female      DOA: 12/21/2022  CC: Toe numbness, tingling and pain    PCP: Eric Cano MD       HPI:     Wily Washington is a 62 y.o. female with past medical history significant for active tobacco user and hypertension who presented to the ED complaining of greater than 3 weeks of left big toe pain. She stated that she has no prior history of this type pain. She denies having any medical condition however she takes medication for high blood pressure and upon reviewing her chart looks like she is also on treatment Triumeq. Patient lives alone at home and stated that she takes all her medication as prescribed. Limited medical records available for review. ED vitals: B/P:188/75  HR:87   RR:16  O2 sat:99% on RA  Temp: 97.8F     Review of Systems  GENERAL: No fever, No chill, No malaise   HEENT: No change in vision, no ear ache, tinnitus, no sore throat or sinus congestion. NECK: No pain or stiffness. PULMONARY: No shortness of breath, no cough or wheeze. Cardiovascular: No chest pain/pressure. no pnd / orthopnea  GASTROINTESTINAL: No abd pain, No nausea/vomiting, No diarrhea, No melena or bright red blood per rectum. GENITOURINARY: No urinary frequency, No urgency or pain with urination. MUSCULOSKELETAL:Left big toe pain and numbness  DERMATOLOGIC: No rash, no itching, no lesions. HEMATOLOGICAL: No easy bruising or bleeding. NEUROLOGIC: No headache, No seizures, No generalized weakness       Past Medical History:   Diagnosis Date    Hypertension     Tobacco abuse        History reviewed. No pertinent surgical history.     Family History   Problem Relation Age of Onset    Breast Cancer Mother     No Known Problems Sister        Social History     Socioeconomic History    Marital status: SINGLE   Tobacco Use    Smoking status: Every Day     Packs/day: 0.25 Types: Cigarettes    Smokeless tobacco: Never   Substance and Sexual Activity    Alcohol use: Yes     Alcohol/week: 14.0 standard drinks     Types: 14 Cans of beer per week    Drug use: Never       Prior to Admission medications    Medication Sig Start Date End Date Taking? Authorizing Provider   methocarbamol (ROBAXIN) 500 mg tablet Take 1 Tab by mouth four (4) times daily. 10/25/12   TITO Pichardo       No Known Allergies         Physical Exam:      Visit Vitals  BP (!) 156/85   Pulse 84   Temp 97.8 °F (36.6 °C)   Resp 17   Ht 5' 6\" (1.676 m)   Wt 98 kg (216 lb)   SpO2 100%   BMI 34.86 kg/m²       Physical Exam:  General:  Alert, no distress   HEENT: Normocephalic, atraumatic. Conjunctivae/corneas clear. PERRL. No drainage or sinus tenderness. Supple neck, trachea midline, no adenopathy. Lungs:   Clear to auscultation bilaterally. Heart:  Regular rate and rhythm, S1, S2 normal, no murmur. Palpable distal pulses   Abdomen: Soft, non-tender, not distended. Bowel sounds normal.    Extremities: No edema. Delay cap refill and non-palpable DP and PT pulses on the left. Pulses: 2+ and symmetric all extremities. Skin: Warm, dry. B/l LE with hyperpigmented lesions, no active ulcerations. Neurologic: AAOx3, No focal motor deficit. Decreased sensation to LLE   Psych:            Needed encouragement and repeated explanation for the reason for her admission. Additional: Hypopigmented area around the R eye    Lab/Data Review:  Labs: Results:       Chemistry Recent Labs     12/21/22 2105   *      K 3.9      CO2 27   BUN 20*   CREA 0.94   CA 9.3   AGAP 3   BUCR 21*      CBC w/Diff Recent Labs     12/21/22 2105   WBC 5.7   RBC 4.49   HGB 12.6   HCT 39.4      GRANS 47   LYMPH 42   EOS 4      Coagulation Recent Labs     12/21/22 2105   APTT 27.0       Iron/Ferritin No results for input(s): IRON in the last 72 hours.     No lab exists for component: TIBCCALC   BNP No results for input(s): BNPP in the last 72 hours. Cardiac Enzymes No results for input(s): CPK, CKND1, LEONARDO in the last 72 hours. No lab exists for component: CKRMB, TROIP   Liver Enzymes No results for input(s): TP, ALB, TBIL, AP in the last 72 hours. No lab exists for component: SGOT, GPT, DBIL   Thyroid Studies Lab Results   Component Value Date/Time    T4, Total 7.6 03/24/2021 11:10 AM    TSH 1.00 03/24/2021 11:10 AM            Imaging Reviewed:  Report per report there is an acute DVT in the left lower extremity with no detectable blood flow to the first 3 digits on the left extremities. Pending CTA chest abdomen pelvis with runoff      Assessment:   Active Problems:    Limb ischemia (12/21/2022)      Current tobacco use (12/22/2022)      Primary hypertension (12/22/2022)      Obesity (BMI 30-39.9) (12/22/2022)          Plan:   Acute left lower extremity ischemia. No prior diagnosis of PVD. Patient is a current tobacco user  -ED consulted vascular who recommended admission and started on heparin drip  -Other imaging pending  -We will start patient on statin and obtain lipid panel and A1c  -Patient stated that she is not ready to quit smoking    Hypertension: Amlodipine 5 mg as listed on patient's record  -dose increased to 10mg daily   May need another agent for optimal blood pressure control    Per record review patient on Trimeq, an antiretroviral agent; last filled on 31 Oct 2022. Pt denies taking other medications.  No prior lab or ID notes available to review  -consider ID consult       Risk of deterioration:  [x]Low    []Moderate  []High     Prophylaxis:  On Heparin drip     Disposition:  [x]Home  [] PT,OT,RN   []SNF/LTC   []SAH/Rehab     Discussed Code Status:         [x]Full Code               Point of contact: Radha Torrez [phone]  ___________________________________________________     Reason for admission, current diagnosis (or pending work-up), treatment plan including consultations reviewed with: [x]Patient   []Family    []ED Care Manager  [x]ED Doc   []Specialist :  Total Time Coordinating Admission:    40  minutes    []Total Critical Care Time:       Suzy Holt DO  12/21/2022, 10:21 PM

## 2022-12-22 NOTE — ED PROVIDER NOTES
EMERGENCY DEPARTMENT HISTORY AND PHYSICAL EXAM        Date: 12/21/2022  Patient Name: Evelyn Mcfarland    History of Presenting Illness    Chief Complaint   Patient presents with    Toe Pain     Patient reports numbness and tingling in bilat feet x3 weeks, denies hx of diabetes or wounds on feet       History Provided By: Patient    HPI: Evelyn Mcfarland, 62 y.o. female, denies significant PMHx presents to the ED with cc of left foot numbness. Patient reports onset 3 weeks ago of tingling, pins-and-needles and numbness to the bottom of all left toes. Patient states dysesthesias have been constant. She denies worsening with weightbearing or walking. Denies relief with elevation or rest.  She denies pain to any other area of the left leg or associated swelling. She denies history of trauma, color or temperature change, denies weakness or foot drop. There are no other complaints, changes, or physical findings at this time. PCP: Roma Adrian MD    No current facility-administered medications on file prior to encounter. Current Outpatient Medications on File Prior to Encounter   Medication Sig Dispense Refill    methocarbamol (ROBAXIN) 500 mg tablet Take 1 Tab by mouth four (4) times daily. 20 Tab 0       Past History    Past Medical History:  No past medical history on file. Past Surgical History:  No past surgical history on file.     Family History:  Family History   Problem Relation Age of Onset    Breast Cancer Mother        Social History:  Social History     Tobacco Use    Smoking status: Never   Substance Use Topics    Alcohol use: No       Allergies:  No Known Allergies      Review of Systems  Review of Systems  Constitutional: no fevers or chills  HEENT: no cough or congestion  Respiratory: no cough, dyspnea, pleuritic pain  Cardio: no chest pain or palpitations  GI: no abdominal pain, nausea, vomiting, constipation or diarrhea  : no change to urinary habits  MSK: left toes parasthesias  Skin: no new rash or wounds  Neuro: +parasthesias, no weakness  Psych: no change to mental status or behavior      Physical Exam  Physical Exam  CONSTITUTIONAL:  Alert, in no apparent distress;  well developed;  well nourished. HEENT:  Head NCAT, membranes are moist  NECK:  No lymphadenopathy  RESPIRATORY:  Chest clear, equal breath sounds, good air movement, normal work of breathing  CARDIOVASCULAR:  Regular rate and rhythm. No murmurs, distal pulses difficult to appreciate, limb is warm  GI:  Normal bowel sounds, abdomen soft and non-tender. MSK: ROM intact to all extremities, no joint swelling. Full ROM left LLE at hip, knee, foot and of all toes. No edema noted. Left calf TTP. No right calf TTP. Difficult to appreciate DP and PT pulses on the left, the limb is warm, delayed cap refill to toes  NEURO:  Sensation diminished to left toes and foot dorsum  SKIN:  hyperpigmented lesions scattered to BLE, but no ulcerated lesions to foot or toes  PSYCH:  Alert and normal affect.       Diagnostic Study Results    Labs -     Recent Results (from the past 12 hour(s))   DUPLEX LOWER EXT ARTERY LEFT    Collection Time: 12/21/22  7:44 PM   Result Value Ref Range    Left CFA dist sys PSV 74.4 cm/s    Left Prox PFA A PSV 49.5 cm/s    Left super femoral dist sys PSV 25.9 cm/s    Left super femoral mid sys PSV 23.7 cm/s    Left super femoral prox sys PSV 42.0 cm/s    Left popliteal dist sys PSV 32.2 cm/s    Left popliteal mid sys PSV 33.6 cm/s    Left popliteal prox sys PSV 24.3 cm/s    Left Dist PTA PSV 21.5 cm/s    Left mid PTA sys PSV 19.3 cm/s    Left Prox PTA PSV 21.5 cm/s    Left Dist Peroneal Velocity 22.6 cm/s    Left mid peroneal sys PSV 25.9 cm/s    Left prox peroneal sys PSV 19.9 cm/s    Left Dist SHAQUILLE Velocity 16.6 cm/s    Left Mid [place] Velocity 16.0 cm/s    Left Prox [place] Velocity 27.5 cm/s    Left SFA Prox Ronaldo Ratio 0.56     Left SFA Mid Ronaldo Ratio 0.56     Left SFA Dist Ronaldo Ratio 1.09     Left Pop A Prox Ronaldo Ratio 0.94     Left Pop A Mid Ronaldo Ratio 1.38     Left Pop A Dist Ronaldo Ratio 0.96        Radiologic Studies -   DUPLEX LOWER EXT ARTERY LEFT         DUPLEX LOWER EXT VENOUS LEFT    (Results Pending)     CT Results  (Last 48 hours)      None          CXR Results  (Last 48 hours)      None            Medical Decision Making  I am the first provider for this patient. I reviewed the vital signs, available nursing notes, past medical history, past surgical history, family history and social history. Vital Signs-Reviewed the patient's vital signs. Patient Vitals for the past 12 hrs:   Temp Pulse Resp BP SpO2   12/21/22 1452 97.8 °F (36.6 °C) 87 16 (!) 188/75 99 %         Provider Notes (Medical Decision Making):   Patient presenting with complaint of tingling, pins-and-needles to the plantar aspect of all left toes. Differential diagnosis including peripheral neuropathy, acute ischemia, DVT. Will obtain vascular studies. ED Course:   Initial assessment performed. The patients presenting problems have been discussed, and they are in agreement with the care plan formulated and outlined with them. I have encouraged them to ask questions as they arise throughout their visit. Patient discussed with vascular surgery, Dr. Acacia Jiménez, who advised hospitalization and heparinization. Discussed with hospitalist, Dr. Em Aleman who has accepted patient for admission. Lab results are pending, patient turned over to April Delroy Bowles PA-C. Disposition:  Admitted, labs pending    PLAN:  1. Current Discharge Medication List        2. Follow-up Information    None       Return to ED if worse     Diagnosis    Clinical Impression:   1. Lower limb ischemia    2. Acute deep vein thrombosis (DVT) of left peroneal vein [facility])        Attestations:    Hi Bee PA-C    Please note that this dictation was completed with [employer], the [employer] voice recognition software.   Quite often unanticipated grammatical, syntax, homophones, and other interpretive errors are inadvertently transcribed by the computer software. Please disregard these errors. Please excuse any errors that have escaped final proofreading. Thank you.

## 2022-12-23 LAB
ACT BLD: 167 SECS (ref 79–138)
ACT BLD: 233 SECS (ref 79–138)
ANION GAP SERPL CALC-SCNC: 8 MMOL/L (ref 3–18)
APTT PPP: >180 SEC (ref 23–36.4)
BASOPHILS # BLD: 0.1 K/UL (ref 0–0.1)
BASOPHILS NFR BLD: 1 % (ref 0–2)
BUN SERPL-MCNC: 18 MG/DL (ref 7–18)
BUN/CREAT SERPL: 21 (ref 12–20)
CALCIUM SERPL-MCNC: 9.2 MG/DL (ref 8.5–10.1)
CHLORIDE SERPL-SCNC: 107 MMOL/L (ref 100–111)
CO2 SERPL-SCNC: 26 MMOL/L (ref 21–32)
CREAT SERPL-MCNC: 0.85 MG/DL (ref 0.6–1.3)
DIFFERENTIAL METHOD BLD: ABNORMAL
EOSINOPHIL # BLD: 0.2 K/UL (ref 0–0.4)
EOSINOPHIL NFR BLD: 3 % (ref 0–5)
ERYTHROCYTE [DISTWIDTH] IN BLOOD BY AUTOMATED COUNT: 14.3 % (ref 11.6–14.5)
GLUCOSE SERPL-MCNC: 79 MG/DL (ref 74–99)
HCT VFR BLD AUTO: 34.4 % (ref 35–45)
HGB BLD-MCNC: 11.3 G/DL (ref 12–16)
IMM GRANULOCYTES # BLD AUTO: 0 K/UL (ref 0–0.04)
IMM GRANULOCYTES NFR BLD AUTO: 1 % (ref 0–0.5)
LYMPHOCYTES # BLD: 2.7 K/UL (ref 0.9–3.6)
LYMPHOCYTES NFR BLD: 42 % (ref 21–52)
MCH RBC QN AUTO: 28.8 PG (ref 24–34)
MCHC RBC AUTO-ENTMCNC: 32.8 G/DL (ref 31–37)
MCV RBC AUTO: 87.8 FL (ref 78–100)
MONOCYTES # BLD: 0.4 K/UL (ref 0.05–1.2)
MONOCYTES NFR BLD: 6 % (ref 3–10)
NEUTS SEG # BLD: 3 K/UL (ref 1.8–8)
NEUTS SEG NFR BLD: 48 % (ref 40–73)
NRBC # BLD: 0 K/UL (ref 0–0.01)
NRBC BLD-RTO: 0 PER 100 WBC
PLATELET # BLD AUTO: 249 K/UL (ref 135–420)
PMV BLD AUTO: 10.1 FL (ref 9.2–11.8)
POTASSIUM SERPL-SCNC: 4 MMOL/L (ref 3.5–5.5)
RBC # BLD AUTO: 3.92 M/UL (ref 4.2–5.3)
SODIUM SERPL-SCNC: 141 MMOL/L (ref 136–145)
WBC # BLD AUTO: 6.4 K/UL (ref 4.6–13.2)

## 2022-12-23 PROCEDURE — 99152 MOD SED SAME PHYS/QHP 5/>YRS: CPT | Performed by: SURGERY

## 2022-12-23 PROCEDURE — B4101ZZ FLUOROSCOPY OF ABDOMINAL AORTA USING LOW OSMOLAR CONTRAST: ICD-10-PCS | Performed by: SURGERY

## 2022-12-23 PROCEDURE — 74011250636 HC RX REV CODE- 250/636: Performed by: HOSPITALIST

## 2022-12-23 PROCEDURE — 76937 US GUIDE VASCULAR ACCESS: CPT | Performed by: SURGERY

## 2022-12-23 PROCEDURE — 99232 SBSQ HOSP IP/OBS MODERATE 35: CPT | Performed by: HOSPITALIST

## 2022-12-23 PROCEDURE — 74011000250 HC RX REV CODE- 250: Performed by: STUDENT IN AN ORGANIZED HEALTH CARE EDUCATION/TRAINING PROGRAM

## 2022-12-23 PROCEDURE — 85025 COMPLETE CBC W/AUTO DIFF WBC: CPT

## 2022-12-23 PROCEDURE — 99153 MOD SED SAME PHYS/QHP EA: CPT | Performed by: SURGERY

## 2022-12-23 PROCEDURE — 65270000029 HC RM PRIVATE

## 2022-12-23 PROCEDURE — 74011000250 HC RX REV CODE- 250: Performed by: SURGERY

## 2022-12-23 PROCEDURE — C1725 CATH, TRANSLUMIN NON-LASER: HCPCS | Performed by: SURGERY

## 2022-12-23 PROCEDURE — 047D3Z1 DILATION OF LEFT COMMON ILIAC ARTERY USING DRUG-COATED BALLOON, PERCUTANEOUS APPROACH: ICD-10-PCS | Performed by: SURGERY

## 2022-12-23 PROCEDURE — 75625 CONTRAST EXAM ABDOMINL AORTA: CPT | Performed by: SURGERY

## 2022-12-23 PROCEDURE — 75710 ARTERY X-RAYS ARM/LEG: CPT | Performed by: SURGERY

## 2022-12-23 PROCEDURE — 74011250636 HC RX REV CODE- 250/636: Performed by: SURGERY

## 2022-12-23 PROCEDURE — 74011000636 HC RX REV CODE- 636: Performed by: SURGERY

## 2022-12-23 PROCEDURE — 85347 COAGULATION TIME ACTIVATED: CPT

## 2022-12-23 PROCEDURE — 74011250637 HC RX REV CODE- 250/637: Performed by: SURGERY

## 2022-12-23 PROCEDURE — 74011250637 HC RX REV CODE- 250/637: Performed by: HOSPITALIST

## 2022-12-23 PROCEDURE — 36415 COLL VENOUS BLD VENIPUNCTURE: CPT

## 2022-12-23 PROCEDURE — 85347 COAGULATION TIME ACTIVATED: CPT | Performed by: SURGERY

## 2022-12-23 PROCEDURE — 80048 BASIC METABOLIC PNL TOTAL CA: CPT

## 2022-12-23 PROCEDURE — 77030004561 HC CATH ANGI DX COBRA ANGI -B: Performed by: SURGERY

## 2022-12-23 PROCEDURE — B41C1ZZ FLUOROSCOPY OF PELVIC ARTERIES USING LOW OSMOLAR CONTRAST: ICD-10-PCS | Performed by: SURGERY

## 2022-12-23 PROCEDURE — C1769 GUIDE WIRE: HCPCS | Performed by: SURGERY

## 2022-12-23 PROCEDURE — 85730 THROMBOPLASTIN TIME PARTIAL: CPT

## 2022-12-23 PROCEDURE — C2623 CATH, TRANSLUMIN, DRUG-COAT: HCPCS | Performed by: SURGERY

## 2022-12-23 PROCEDURE — 37220 HC PTA ILIAC INIT: CPT | Performed by: SURGERY

## 2022-12-23 PROCEDURE — C1760 CLOSURE DEV, VASC: HCPCS | Performed by: SURGERY

## 2022-12-23 PROCEDURE — 74011250637 HC RX REV CODE- 250/637: Performed by: STUDENT IN AN ORGANIZED HEALTH CARE EDUCATION/TRAINING PROGRAM

## 2022-12-23 PROCEDURE — 77030013744: Performed by: SURGERY

## 2022-12-23 PROCEDURE — C1894 INTRO/SHEATH, NON-LASER: HCPCS | Performed by: SURGERY

## 2022-12-23 RX ORDER — HEPARIN SODIUM 1000 [USP'U]/ML
INJECTION, SOLUTION INTRAVENOUS; SUBCUTANEOUS AS NEEDED
Status: DISCONTINUED | OUTPATIENT
Start: 2022-12-23 | End: 2022-12-23 | Stop reason: HOSPADM

## 2022-12-23 RX ORDER — LABETALOL HYDROCHLORIDE 5 MG/ML
INJECTION, SOLUTION INTRAVENOUS AS NEEDED
Status: DISCONTINUED | OUTPATIENT
Start: 2022-12-23 | End: 2022-12-23 | Stop reason: HOSPADM

## 2022-12-23 RX ORDER — LIDOCAINE HYDROCHLORIDE 10 MG/ML
INJECTION, SOLUTION EPIDURAL; INFILTRATION; INTRACAUDAL; PERINEURAL AS NEEDED
Status: DISCONTINUED | OUTPATIENT
Start: 2022-12-23 | End: 2022-12-23 | Stop reason: HOSPADM

## 2022-12-23 RX ORDER — HEPARIN SODIUM 200 [USP'U]/100ML
INJECTION, SOLUTION INTRAVENOUS
Status: COMPLETED | OUTPATIENT
Start: 2022-12-23 | End: 2022-12-23

## 2022-12-23 RX ORDER — FENTANYL CITRATE 50 UG/ML
INJECTION, SOLUTION INTRAMUSCULAR; INTRAVENOUS AS NEEDED
Status: DISCONTINUED | OUTPATIENT
Start: 2022-12-23 | End: 2022-12-23 | Stop reason: HOSPADM

## 2022-12-23 RX ORDER — PROTAMINE SULFATE 10 MG/ML
INJECTION, SOLUTION INTRAVENOUS AS NEEDED
Status: DISCONTINUED | OUTPATIENT
Start: 2022-12-23 | End: 2022-12-23 | Stop reason: HOSPADM

## 2022-12-23 RX ORDER — MIDAZOLAM HYDROCHLORIDE 1 MG/ML
INJECTION, SOLUTION INTRAMUSCULAR; INTRAVENOUS AS NEEDED
Status: DISCONTINUED | OUTPATIENT
Start: 2022-12-23 | End: 2022-12-23 | Stop reason: HOSPADM

## 2022-12-23 RX ORDER — CEFAZOLIN SODIUM 1 G/3ML
INJECTION, POWDER, FOR SOLUTION INTRAMUSCULAR; INTRAVENOUS AS NEEDED
Status: DISCONTINUED | OUTPATIENT
Start: 2022-12-23 | End: 2022-12-23 | Stop reason: HOSPADM

## 2022-12-23 RX ADMIN — ACETAMINOPHEN 650 MG: 325 TABLET, FILM COATED ORAL at 13:04

## 2022-12-23 RX ADMIN — ABACAVIR SULFATE, DOLUTEGRAVIR SODIUM, LAMIVUDINE 1 TABLET: 600; 50; 300 TABLET, FILM COATED ORAL at 11:11

## 2022-12-23 RX ADMIN — POTASSIUM CHLORIDE 10 MEQ: 750 TABLET, EXTENDED RELEASE ORAL at 13:04

## 2022-12-23 RX ADMIN — HYDRALAZINE HYDROCHLORIDE 10 MG: 20 INJECTION INTRAMUSCULAR; INTRAVENOUS at 00:08

## 2022-12-23 RX ADMIN — AMLODIPINE BESYLATE 10 MG: 10 TABLET ORAL at 11:11

## 2022-12-23 RX ADMIN — APIXABAN 5 MG: 5 TABLET, FILM COATED ORAL at 17:19

## 2022-12-23 RX ADMIN — SODIUM CHLORIDE, PRESERVATIVE FREE 10 ML: 5 INJECTION INTRAVENOUS at 20:27

## 2022-12-23 RX ADMIN — ACETAMINOPHEN 650 MG: 325 TABLET, FILM COATED ORAL at 21:26

## 2022-12-23 RX ADMIN — SODIUM CHLORIDE, PRESERVATIVE FREE 10 ML: 5 INJECTION INTRAVENOUS at 17:19

## 2022-12-23 NOTE — PROGRESS NOTES
8011  TRANSFER - IN REPORT:    Verbal report received from [address], RN(name) on [facility]  being received from [address] [place] [address](unit) for ordered procedure      Report consisted of patients Situation, Background, Assessment and   Recommendations(SBAR). Information from the following report(s) SBAR, Intake/Output, MAR, Accordion, Recent Results, Med Rec Status, Procedure Verification, Quality Measures, and Dual Neuro Assessment was reviewed with the receiving nurse. Opportunity for questions and clarification was provided. Assessment completed upon patients arrival to unit and care assumed. 0801  TRANSFER - OUT REPORT:    Verbal report given to Jony(name) on [facility]  being transferred to cath lab(unit) for ordered procedure       Report consisted of patients Situation, Background, Assessment and   Recommendations(SBAR). Information from the following report(s) SBAR, Intake/Output, MAR, Accordion, Recent Results, Med Rec Status, Pre Procedure Checklist, Procedure Verification, Quality Measures, and Dual Neuro Assessment was reviewed with the receiving nurse. Lines:   Peripheral IV 12/21/22 Left Antecubital (Active)   Site Assessment Clean, dry, & intact 12/23/22 0739   Phlebitis Assessment 0 12/23/22 0739   Infiltration Assessment 0 12/22/22 2227   Dressing Status Clean, dry, & intact 12/23/22 0739   Dressing Type Transparent 12/23/22 0739   Action Taken Dressing reinforced 12/22/22 2227       Peripheral IV 12/21/22 Left;Posterior Hand (Active)   Site Assessment Clean, dry, & intact 12/23/22 0739   Phlebitis Assessment 0 12/23/22 0739   Infiltration Assessment 0 12/22/22 2227   Dressing Status Clean, dry, & intact 12/23/22 0739   Dressing Type Transparent 12/23/22 0739        Opportunity for questions and clarification was provided.       Patient transported with:   Tech    1016  TRANSFER - IN REPORT:    Verbal report received from Sri(name) on [facility]  being received from Cath lab(unit) for routine post - op      Report consisted of patients Situation, Background, Assessment and   Recommendations(SBAR). Information from the following report(s) SBAR, Procedure Summary, Intake/Output, MAR, Accordion, Recent Results, Procedure Verification, Quality Measures, and Dual Neuro Assessment was reviewed with the receiving nurse. Opportunity for questions and clarification was provided. Assessment completed upon patients arrival to unit and care assumed. 1020  TRANSFER - OUT REPORT:    Verbal report given to Ines Lundberg RN(name) on [facility]  being transferred to [address] [place] [address](unit) for routine progression of care       Report consisted of patients Situation, Background, Assessment and   Recommendations(SBAR). Information from the following report(s) SBAR, Procedure Summary, Intake/Output, MAR, Accordion, Recent Results, Med Rec Status, Procedure Verification, Quality Measures, and Dual Neuro Assessment was reviewed with the receiving nurse. Lines:   Peripheral IV 12/21/22 Left Antecubital (Active)   Site Assessment Clean, dry, & intact 12/23/22 0739   Phlebitis Assessment 0 12/23/22 0739   Infiltration Assessment 0 12/22/22 2227   Dressing Status Clean, dry, & intact 12/23/22 0739   Dressing Type Transparent 12/23/22 0739   Action Taken Dressing reinforced 12/22/22 2227       Peripheral IV 12/21/22 Left;Posterior Hand (Active)   Site Assessment Clean, dry, & intact 12/23/22 0739   Phlebitis Assessment 0 12/23/22 0739   Infiltration Assessment 0 12/22/22 2227   Dressing Status Clean, dry, & intact 12/23/22 0739   Dressing Type Transparent 12/23/22 0739        Opportunity for questions and clarification was provided. Transport placed to bring patient back to room 416 at 1030.

## 2022-12-23 NOTE — PROGRESS NOTES
[facility]  Progress Note    Patient: Berna Dorantes Age: 62 y.o. : 1965 MR#: [id number] SSN: [id number]  Date/Time: 2022     Subjective:     Patient seen and evaluated, sitting up in recliner, no acute distress. 59-year-old female with a past medical history of tobacco use, hypertension presents to the emergency room secondary to left leg pain. ER evaluation-patient noted to have acute left lower extremity ischemia, vascular surgery consulted, recommended heparin GTT. Patient admitted to the hospital for further evaluation. -patient underwent aortoiliac angiogram with left lower extremity runoff through the left common femoral artery sheath. Patient tolerated procedure well. Vascular surgery notes reviewed. Discussed with vascular surgery, continue Eliquis 5 mg twice daily. Assessment/Plan:     Acute left lower extremity ischemia - patient underwent aortoiliac angiogram with left lower extremity runoff through the left common femoral artery sheath. Continue Eliquis BID  History of hypertension-resume home medications  History of HIV-continue Triumeq  DVT prophylaxis - Eliquis   Full code                Anton Wiley MD  22      Case discussed with:  [x]Patient  []Family  [x]Nursing  []Case Management  DVT Prophylaxis:  []Lovenox  []Hep SQ  []SCDs  []Coumadin   [x]On Heparin gtt    Objective:   VS: Visit Vitals  BP (!) 144/75 (BP 1 Location: Right arm, BP Patient Position: At rest)   Pulse 87   Temp 97.2 °F (36.2 °C)   Resp 18   Ht 5' 6\" (1.676 m)   Wt 98 kg (216 lb)   SpO2 97%   Breastfeeding No   BMI 34.86 kg/m²        Tmax/24hrs: Temp (24hrs), Av.8 °F (36.6 °C), Min:97.2 °F (36.2 °C), Max:98.5 °F (36.9 °C)  IOBRIEFNo intake or output data in the 24 hours ending 22 1558      General:  Alert, cooperative, no acute distress    Pulmonary:  CTA Bilaterally. No Wheezing/Rhonchi/Rales.   Cardiovascular: Regular rate and Rhythm. GI:  Soft, Non distended, Non tender. + Bowel sounds. Extremities: Left leg pain and decreased pulses left lower extremity  Neurologic: Alert and oriented X 3. No acute neuro deficits. Additional:    Medications:   Current Facility-Administered Medications   Medication Dose Route Frequency    apixaban (ELIQUIS) tablet 5 mg  5 mg Oral BID    cholecalciferol (VITAMIN D3) wafer 50,000 Units  50,000 Units Oral Q7D    potassium chloride (KLOR-CON M10) tablet 10 mEq  10 mEq Oral DAILY    sodium chloride (NS) flush 5-40 mL  5-40 mL IntraVENous Q8H    sodium chloride (NS) flush 5-40 mL  5-40 mL IntraVENous PRN    acetaminophen (TYLENOL) tablet 650 mg  650 mg Oral Q6H PRN    Or    acetaminophen (TYLENOL) suppository 650 mg  650 mg Rectal Q6H PRN    polyethylene glycol (MIRALAX) packet 17 g  17 g Oral DAILY PRN    ondansetron (ZOFRAN ODT) tablet 4 mg  4 mg Oral Q8H PRN    Or    ondansetron (ZOFRAN) injection 4 mg  4 mg IntraVENous Q6H PRN    amLODIPine (NORVASC) tablet 10 mg  10 mg Oral DAILY    abacavir-dolutegravir-lamiVUDine (TRIUMEQ) 600- mg tablet 1 Tablet  1 Tablet Oral DAILY    hydrALAZINE (APRESOLINE) 20 mg/mL injection 10 mg  10 mg IntraVENous Q6H PRN       Imaging:   XR Results (most recent):  Results from Hospital Encounter encounter on 11/05/21    XR HIP RT W OR WO PELV 2-3 VWS    Narrative  EXAM: HIP TWO VIEWS RIGHT    CLINICAL HISTORY/INDICATION: Right hip pain    COMPARISON: None. TECHNIQUE: AP pelvis and frog view    FINDINGS:    The bony pelvic ring is intact. The right hip joint space is unremarkable. There  is no evidence of fracture or dislocation. Mineralization is normal.    Impression  Negative hip. CT Results (most recent):  Results from Hospital Encounter encounter on 12/21/22    CTA CHEST ABD PELV W CONT    Narrative  CTA Chest, Abdomen And Pelvis With Enhancement    INDICATION: Left foot numbness.  3 weeks of tingling, paresthesias, numbness to  left lower extremity digits, worse with weightbearing, smoking history,  hypertension. TECHNIQUE: Axial images obtained from the thoracic inlet to the level of the  pubic symphysis following the uneventful administration of intravenous contrast.  Imaging performed during maximum aortic enhancement and is therefore suboptimal  for evaluating solid organs and bowel. 3-D imaging performed on a separate  workstation. All CT scans at this facility are performed using dose optimization technique as  appropriate to a performed exam, to include automated exposure control,  adjustment of the mA and/or kV according to patient size (including appropriate  matching first site-specific examinations), or use of iterative reconstruction  technique. COMPARISON: 4/30/2009. Thyroid: Unremarkable. Heart: No effusion. Vessels: Pulmonary arteries are patent. Lymph Nodes: Unremarkable. Lung: Mild emphysema. 0.9 x 0.7 cm (0.8 cm average) density in the right  peribronchial upper lobe (series 2, image 42) is similar to increased since  2009. There is unchanged reticulation in the periphery of the left upper lobe  (72). Pleura: Unremarkable. ABDOMEN FINDINGS:  There is suboptimal evaluation of visceral organs secondary to timing of the  exam.    Liver: Unremarkable. Spleen: Unremarkable. Pancreas: Unremarkable. Biliary: Unremarkable. Bowel: Mild diverticulosis. Peritoneum/ Retroperitoneum: Unremarkable. Lymph Nodes: Unremarkable. Adrenal Glands: Bilateral thickening without nodularity. Kidneys: Scarring right upper pole. PELVIS FINDINGS:    Bladder/ Pelvic Organs: Unremarkable. Bones/Soft tissues: Lumbar facet hypertrophy and arthropathy. SI joint  degenerative changes. Sclerosis at the left pubic symphysis. Degenerative disc  disease. AORTA FINDINGS:  Thoracic aorta is normal in caliber and without acute aortic pathology. 3 vessel  arch anatomy. Moderate stenosis of the left subclavian artery.  Mild aortic wall  calcifications greater in the infrarenal aorta. 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 There is mild poststenotic  dilation. Mild SMA stenosis. There is peripheral SMA calcification with at least  moderate stenosis. Mild stenosis left renal artery. Right renal artery is  patent. Severe stenosis right common iliac. Regions of severe stenosis right  internal iliac. Diminutive flow within the right external iliac artery. Severe  stenosis peripheral left common iliac. Severe stenosis origin left external  iliac. Severe stenosis left internal iliac. See dedicated runoff CT for lower  extremity vascular findings. Impression  See separate dedicated CT runoff report. 1.  Moderate stenosis left subclavian artery. 2.  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 Poststenotic dilation suggests hemodynamic significance. 3.  At least moderate stenosis peripheral SMA. 4.  Severe stenosis right common iliac, regions of internal iliac. Diminutive  flow external iliac. 5.  Severe stenosis left common iliac, internal iliac and external iliac. 6.  Mild emphysema. 7.  8 mm pulmonary density right peribronchial upper lobe is similar to  increased since 2009. Similarity over time is reassuring. Recommend 3-6 month  follow-up chest CT. Unchanged reticulation left upper lobe. 8.  Mild diverticulosis. Preliminary report provided by Dr. Anthony Hunt on 12/22/2022 at [facility] hours prior to  3-D image availability. 12/21/22    ECHO ADULT COMPLETE 12/22/2022 12/22/2022    Interpretation Summary    Left Ventricle: Normal left ventricular systolic function with a visually estimated EF of 60 - 65%. Left ventricle size is normal. LVIDd is 4.4 cm. Mildly increased wall thickness. Findings consistent with mild concentric hypertrophy. Normal wall motion. Indeterminate diastolic function.     Right Ventricle: Right ventricle size is normal. Normal systolic function. TAPSE is 2.3 cm. Tricuspid Valve: Unable to assess RVSP due to inadequate or insignificant tricuspid regurgitation. Signed by: Kwame Hudson DO on 12/22/2022 12:35 PM       MRI Results (most recent):  No results found for this or any previous visit. Labs:    Recent Results (from the past 48 hour(s))   DUPLEX LOWER EXT ARTERY LEFT    Collection Time: 12/21/22  7:44 PM   Result Value Ref Range    Left CFA dist sys PSV 74.4 cm/s    Left Prox PFA A PSV 49.5 cm/s    Left super femoral dist sys PSV 25.9 cm/s    Left super femoral mid sys PSV 23.7 cm/s    Left super femoral prox sys PSV 42.0 cm/s    Left popliteal dist sys PSV 32.2 cm/s    Left popliteal mid sys PSV 33.6 cm/s    Left popliteal prox sys PSV 24.3 cm/s    Left Dist PTA PSV 21.5 cm/s    Left mid PTA sys PSV 19.3 cm/s    Left Prox PTA PSV 21.5 cm/s    Left Dist Peroneal Velocity 22.6 cm/s    Left mid peroneal sys PSV 25.9 cm/s    Left prox peroneal sys PSV 19.9 cm/s    Left Dist SHA[place]LE Velocity 16.6 cm/s    Left Mid [place] Velocity 16.0 cm/s    Left Prox [place] Velocity 27.5 cm/s    Left SFA Prox Ronaldo Ratio 0.56     Left SFA Mid Ronaldo Ratio 0.56     Left SFA Dist Ronaldo Ratio 1.09     Left Pop A Prox Ronaldo Ratio 0.94     Left Pop A Mid Ronaldo Ratio 1.38     Left Pop A Dist Ronaldo Ratio 0.96    CBC WITH AUTOMATED DIFF    Collection Time: 12/21/22  9:05 PM   Result Value Ref Range    WBC 5.7 4.6 - 13.2 K/uL    RBC 4.49 4.20 - 5.30 M/uL    HGB 12.6 12.0 - 16.0 g/dL    HCT 39.4 35.0 - 45.0 %    MCV 87.8 78.0 - 100.0 FL    MCH 28.1 24.0 - 34.0 PG    MCHC 32.0 31.0 - 37.0 g/dL    RDW 13.9 11.6 - 14.5 %    PLATELET 235 210 - 183 K/uL    MPV 9.3 9.2 - 11.8 FL    NRBC 0.0 0  WBC    ABSOLUTE NRBC 0.00 0.00 - 0.01 K/uL    NEUTROPHILS 47 40 - 73 %    LYMPHOCYTES 42 21 - 52 %    MONOCYTES 5 3 - 10 %    EOSINOPHILS 4 0 - 5 %    BASOPHILS 1 0 - 2 %    IMMATURE GRANULOCYTES 1 (H) 0.0 - 0.5 %    ABS. NEUTROPHILS 2.7 1.8 - 8.0 K/UL    ABS.  LYMPHOCYTES 2.4 0.9 - 3.6 K/UL    ABS. MONOCYTES 0.3 0.05 - 1.2 K/UL    ABS. EOSINOPHILS 0.2 0.0 - 0.4 K/UL    ABS. BASOPHILS 0.0 0.0 - 0.1 K/UL    ABS. IMM. GRANS. 0.0 0.00 - 0.04 K/UL    DF AUTOMATED     PTT    Collection Time: 12/21/22  9:05 PM   Result Value Ref Range    aPTT 27.0 23.0 - 98.0 SEC   METABOLIC PANEL, BASIC    Collection Time: 12/21/22  9:05 PM   Result Value Ref Range    Sodium 139 136 - 145 mmol/L    Potassium 3.9 3.5 - 5.5 mmol/L    Chloride 109 100 - 111 mmol/L    CO2 27 21 - 32 mmol/L    Anion gap 3 3.0 - 18 mmol/L    Glucose 100 (H) 74 - 99 mg/dL    BUN 20 (H) 7.0 - 18 MG/DL    Creatinine 0.94 0.6 - 1.3 MG/DL    BUN/Creatinine ratio 21 (H) 12 - 20      eGFR >60 >60 ml/min/1.73m2    Calcium 9.3 8.5 - 10.1 MG/DL   LIPID PANEL    Collection Time: 12/21/22  9:05 PM   Result Value Ref Range    LIPID PROFILE          Cholesterol, total 187 <200 MG/DL    Triglyceride 144 <150 MG/DL    HDL Cholesterol 40 40 - 60 MG/DL    LDL, calculated 118.2 (H) 0 - 100 MG/DL    VLDL, calculated 28.8 MG/DL    CHOL/HDL Ratio 4.7 0 - 5.0     HEMOGLOBIN A1C WITH EAG    Collection Time: 12/21/22  9:05 PM   Result Value Ref Range    Hemoglobin A1c 5.9 (H) 4.2 - 5.6 %    Est. average glucose 123 mg/dL   PTT    Collection Time: 12/22/22  3:45 AM   Result Value Ref Range    aPTT 116.9 (H) 23.0 - 36.4 SEC   CBC WITH AUTOMATED DIFF    Collection Time: 12/22/22  3:45 AM   Result Value Ref Range    WBC 6.0 4.6 - 13.2 K/uL    RBC 4.25 4.20 - 5.30 M/uL    HGB 12.0 12.0 - 16.0 g/dL    HCT 37.0 35.0 - 45.0 %    MCV 87.1 78.0 - 100.0 FL    MCH 28.2 24.0 - 34.0 PG    MCHC 32.4 31.0 - 37.0 g/dL    RDW 14.1 11.6 - 14.5 %    PLATELET 788 217 - 911 K/uL    MPV 9.7 9.2 - 11.8 FL    NRBC 0.0 0  WBC    ABSOLUTE NRBC 0.00 0.00 - 0.01 K/uL    NEUTROPHILS 44 40 - 73 %    LYMPHOCYTES 45 21 - 52 %    MONOCYTES 5 3 - 10 %    EOSINOPHILS 4 0 - 5 %    BASOPHILS 1 0 - 2 %    IMMATURE GRANULOCYTES 1 (H) 0.0 - 0.5 %    ABS. NEUTROPHILS 2.6 1.8 - 8.0 K/UL    ABS. LYMPHOCYTES 2.7 0.9 - 3.6 K/UL    ABS. MONOCYTES 0.3 0.05 - 1.2 K/UL    ABS. EOSINOPHILS 0.3 0.0 - 0.4 K/UL    ABS. BASOPHILS 0.1 0.0 - 0.1 K/UL    ABS. IMM.  GRANS. 0.0 0.00 - 0.04 K/UL    DF AUTOMATED     ECHO ADULT COMPLETE    Collection Time: 12/22/22  8:58 AM   Result Value Ref Range    IVSd 1.0 (A) 0.6 - 0.9 cm    LVIDd 4.4 3.9 - 5.3 cm    LVIDs 3.4 cm    LVOT Diameter 1.8 cm    LVPWd 1.0 (A) 0.6 - 0.9 cm    LVOT Peak Gradient 5 mmHg    LVOT Mean Gradient 2 mmHg    LVOT SV 62.6 ml    LVOT Peak Velocity 1.2 m/s    LVOT VTI 24.6 cm    RV Free Wall Peak S' 10 cm/s    LA Volume A/L 55 mL    LA Volume 2C 51 22 - 52 mL    LA Volume 4C 44 22 - 52 mL    AV Area by Peak Velocity 1.4 cm2    AV Area by VTI 1.7 cm2    AV Peak Gradient 17 mmHg    AV Mean Gradient 7 mmHg    AV Peak Velocity 2.1 m/s    AV Mean Velocity 1.3 m/s    AV VTI 36.3 cm    MV A Velocity 1.08 m/s    MV E Wave Deceleration Time 179.4 ms    MV E Velocity 0.86 m/s    LV E' Lateral Velocity 6 cm/s    LV E' Septal Velocity 4 cm/s    TAPSE 2.3 1.7 cm    Aortic Root 2.9 cm    Fractional Shortening 2D 23 28 - 44 %    LVIDd Index 2.13 cm/m2    LVIDs Index 1.64 cm/m2    LV RWT Ratio 0.45     LV Mass 2D 147.8 67 - 162 g    LV Mass 2D Index 71.4 43 - 95 g/m2    MV E/A 0.80     E/E' Ratio (Averaged) 17.92     E/E' Lateral 14.33     E/E' Septal 21.50     LA Volume Index A/L 27 16 - 34 mL/m2    LVOT Stroke Volume Index 30.2 mL/m2    LVOT Area 2.5 cm2    LA Volume Index 2C 25 16 - 34 mL/m2    LA Volume Index 4C 21 16 - 34 mL/m2    Ao Root Index 1.40 cm/m2    AV Velocity Ratio 0.57     LVOT:AV VTI Index 0.68     BEVERLY/BSA VTI 0.8 cm2/m2    BEVERLY/BSA Peak Velocity 0.7 cm2/m2    RV Basal Dimension 3.6 cm    RA Area 4C 11.6 cm2    Est. RA Pressure 3 mmHg   PTT    Collection Time: 12/22/22  9:55 AM   Result Value Ref Range    aPTT 75.3 (H) 23.0 - 36.4 SEC   TYPE & SCREEN    Collection Time: 12/22/22  1:38 PM   Result Value Ref Range    Crossmatch Expiration 12/25/2022,7446 ABO/Rh(D) O POSITIVE     Antibody screen NEG    PTT    Collection Time: 12/22/22  2:42 PM   Result Value Ref Range    aPTT 28.2 23.0 - 36.4 SEC   CBC WITH AUTOMATED DIFF    Collection Time: 12/23/22 12:17 AM   Result Value Ref Range    WBC 6.4 4.6 - 13.2 K/uL    RBC 3.92 (L) 4.20 - 5.30 M/uL    HGB 11.3 (L) 12.0 - 16.0 g/dL    HCT 34.4 (L) 35.0 - 45.0 %    MCV 87.8 78.0 - 100.0 FL    MCH 28.8 24.0 - 34.0 PG    MCHC 32.8 31.0 - 37.0 g/dL    RDW 14.3 11.6 - 14.5 %    PLATELET 449 034 - 077 K/uL    MPV 10.1 9.2 - 11.8 FL    NRBC 0.0 0  WBC    ABSOLUTE NRBC 0.00 0.00 - 0.01 K/uL    NEUTROPHILS 48 40 - 73 %    LYMPHOCYTES 42 21 - 52 %    MONOCYTES 6 3 - 10 %    EOSINOPHILS 3 0 - 5 %    BASOPHILS 1 0 - 2 %    IMMATURE GRANULOCYTES 1 (H) 0.0 - 0.5 %    ABS. NEUTROPHILS 3.0 1.8 - 8.0 K/UL    ABS. LYMPHOCYTES 2.7 0.9 - 3.6 K/UL    ABS. MONOCYTES 0.4 0.05 - 1.2 K/UL    ABS. EOSINOPHILS 0.2 0.0 - 0.4 K/UL    ABS. BASOPHILS 0.1 0.0 - 0.1 K/UL    ABS. IMM.  GRANS. 0.0 0.00 - 0.04 K/UL    DF AUTOMATED     METABOLIC PANEL, BASIC    Collection Time: 12/23/22 12:17 AM   Result Value Ref Range    Sodium 141 136 - 145 mmol/L    Potassium 4.0 3.5 - 5.5 mmol/L    Chloride 107 100 - 111 mmol/L    CO2 26 21 - 32 mmol/L    Anion gap 8 3.0 - 18 mmol/L    Glucose 79 74 - 99 mg/dL    BUN 18 7.0 - 18 MG/DL    Creatinine 0.85 0.6 - 1.3 MG/DL    BUN/Creatinine ratio 21 (H) 12 - 20      eGFR >60 >60 ml/min/1.73m2    Calcium 9.2 8.5 - 10.1 MG/DL   PTT    Collection Time: 12/23/22 12:17 AM   Result Value Ref Range    aPTT >180.0 (HH) 23.0 - 36.4 SEC   POC ACTIVATED CLOTTING TIME    Collection Time: 12/23/22  8:40 AM   Result Value Ref Range    Activated Clotting Time (POC) 167 (H) 79 - 138 SECS   POC ACTIVATED CLOTTING TIME    Collection Time: 12/23/22  9:03 AM   Result Value Ref Range    Activated Clotting Time (POC) 233 (H) 79 - 138 SECS       Signed By: Anna Fernando MD     December 23, 2022      I spent 25 minutes with the patient in face-to-face consultation, of which greater than 50% was spent in counseling and coordination of care as described above    Disclaimer: Sections of this note are dictated using utilizing voice recognition software. Minor typographical errors may be present. If questions arise, please do not hesitate to contact me or call our department.

## 2022-12-23 NOTE — OP NOTES
Date: 12/23/2022    Pre-Op Dx : Bilateral lower extremity arterial insufficiency with ischemic rest pain left lower extremity  Procedure : Aortoiliac angiogram with left lower extremity runoff performed through the left common femoral artery sheath  Surgeon : Flores Buenrostro MD  Assistant: None  Anesthesia : Con-Sed: Versed 2mg, Fentanyl 150 mics  Findings :  Patent distal abdominal aorta   2. There is a 70 to 80% stenosis of the right common iliac artery, from its origin. The right external and internal iliac arteries are patent. The right common femoral artery the proximal right profunda femoris and the proximal superficial femoral artery are patent. 3.  In the left distal common iliac artery extending onto the proximal external there is a mixed focal 1cm 99% stenosis. Occluded left IIA  4. Patent left common femoral artery, left profunda femoris artery. The left superficial femoral artery is underfilled with occlusion of the left SFA in its midportion. Reconstitution of the distal SFA through collaterals. Patent left popliteal artery with 3 vessels runoff -the PT is dominant   5. After balloon angioplasty with 6 mm balloon, there was improvement in the luminal patency of the left iliac arteries stenosis and hence a drug-eluting balloon was used to balloon angioplasty of this lesion-which was very focal, and responded well to balloon angioplasty. 6.  6 [place] Angio-Seal closure of the left common femoral artery access    Complications: None  EBL : Minimal  Contrast : 80 ml  Heparin 5000 units -  sec - reversed with 20 mg Protamine  Labetolol 20 mg IV for BP control . Ancef 2 g IV  Access :   Left common femoral artery: Ultrasound-guided retrograde 6 Fr sheath insertion, 6 Fr Angioseal closure    Indication for the procedure: The patient is a 59-year-old lady who presented with worsening pain  of the entire left leg, and numbness of the left foot of 3 weeks duration.   She is on antiretroviral therapy for  HIV, and is also a smoker. CT angiogram of the aorta with runoff revealed bilateral iliac artery disease, with tight focal left distal common iliac/proximal external iliac artery stenosis, and left SFA occlusion. She was placed on heparin drip. Echocardiogram revealed normal ejection fraction, with mild concentric LV hypertrophy. The above-mentioned procedure is being performed to improve inflow to the left lower extremity, for limb salvage. Consent was obtained from the patient, for the procedure, after explaining the risks and complications of the procedure, which include but are not limited to bleeding, infection, cardiorespiratory complications, arterial embolic complications, contrast-induced complications including allergy, nephropathy, failure to treat, recurrent stenosis, especially with ongoing smoking etc.  The patient understood and agreed to the procedure. Procedure: The patient is currently identified and placed upon angiographic table. Both groins were cleaned and draped in a sterile fashion. She received 2 g Ancef intravenously as antibiotic prophylaxis. A timeout was performed. Intravenous sedation was administered. The left common femoral artery was accessed with a micropuncture needle in a retrograde fashion, under ultrasound guidance, after administering local anesthesia with 1% lidocaine. The needle was exchanged to a 4 [place] micro sheath over a 0.018 inch wire. The sheath was exchanged to a 6 [place] sheath over an 0.035 advantage Glidewire. An Omni Flush catheter was introduced into the abdominal aorta and aortoiliac angiogram was performed with a power injector. Findings as mentioned above. The catheter was again exchanged to the advantage Glidewire. The patient received 5000 units of systemic heparin. And she had a tight lesion, it was decided to pre dilate the lesion. A 6 x 80 mm [place] balloon was used to to balloon angioplasty the lesion.   Post angioplasty imaging revealed  significant improvement in the luminal patency of the artery. As the lesion was very focal and responded well to balloon angioplasty, stenting was deferred. A 7 x 60 mm In. Pact Medtronic drug-eluting balloon was then deployed across the lesion, and balloon angioplastied for 3 minutes as per the 's instructions. Repeat angioplasty confirmed improved luminal patency of the treated lesion, with less than 20% residual stenosis. Left lower extremity runoff was then performed through the left common femoral artery sheath. The activated clotting time was 233 seconds. She received 20 mg of Protamine for reversal.  A 6 [place] Angio-Seal device was used to close the left common femoral artery access site. The patient was sent to the recovery area in stable condition and no immediate complications to the procedure were observed. Plan:  Oral anticoagulation with Eliquis for left acute peroneal vein DVT for at least 3 months in addition to aspirin 81 mg.     Azalia Eisenmenger, MD

## 2022-12-23 NOTE — ROUTINE PROCESS
0730- Bedside, Verbal shift change report received by Nisha Mcwilliams (oncoming nurse) by Tuyet Spearing nurseReggie. Report included the following information SBAR, Kardex, Intake/Output, MAR and Recent Results. 1030-Assessment completed, call bell within reach, no distress noted. Left groin puncture site without, hematoma or bleeding. Dressing D/I. Instructed to remain on bed rest until 1:30 pm and to keep left leg straight. Verbalized understanding. AM  medications administered, pt tolerated with ease, will continue to monitor. PRN pain medications administered, pt tolerated with ease, will continue to monitor. 1300- Shift reassessment, pt condition unchanged, will continue to monitor. 1600-  Shift reassessment, pt condition unchanged, will continue to monitor. -- Bedside, Verbal and Written shift change report given to (oncoming nurse) by Nisha Mcwilliams (offgoing nurse). Report included the following information SBAR, Kardex, Intake/Output, MAR and Recent Results. Skin assessment completed.

## 2022-12-23 NOTE — ED NOTES
TRANSFER - OUT REPORT:    Verbal report given to Jennifer Sanchez RN (name) on Pam Burr  being transferred to [facility] (unit) for routine progression of care       Report consisted of patients Situation, Background, Assessment and   Recommendations(SBAR). Information from the following report(s) SBAR, ED Summary, MAR, and Recent Results was reviewed with the receiving nurse. Lines:   Peripheral IV 12/21/22 Left Antecubital (Active)       Peripheral IV 12/21/22 Left;Posterior Hand (Active)        Opportunity for questions and clarification was provided.       Patient transported with:   Registered Nurse

## 2022-12-24 ENCOUNTER — APPOINTMENT (OUTPATIENT)
Dept: MRI IMAGING | Age: 57
DRG: 182 | End: 2022-12-24
Attending: SURGERY
Payer: MEDICAID

## 2022-12-24 LAB
APTT PPP: 28.7 SEC (ref 23–36.4)
BASOPHILS # BLD: 0 K/UL (ref 0–0.1)
BASOPHILS NFR BLD: 0 % (ref 0–2)
DIFFERENTIAL METHOD BLD: ABNORMAL
EOSINOPHIL # BLD: 0.2 K/UL (ref 0–0.4)
EOSINOPHIL NFR BLD: 2 % (ref 0–5)
ERYTHROCYTE [DISTWIDTH] IN BLOOD BY AUTOMATED COUNT: 14.3 % (ref 11.6–14.5)
HCT VFR BLD AUTO: 34.9 % (ref 35–45)
HGB BLD-MCNC: 11.2 G/DL (ref 12–16)
IMM GRANULOCYTES # BLD AUTO: 0 K/UL (ref 0–0.04)
IMM GRANULOCYTES NFR BLD AUTO: 0 % (ref 0–0.5)
LYMPHOCYTES # BLD: 2 K/UL (ref 0.9–3.6)
LYMPHOCYTES NFR BLD: 30 % (ref 21–52)
MCH RBC QN AUTO: 29.1 PG (ref 24–34)
MCHC RBC AUTO-ENTMCNC: 32.1 G/DL (ref 31–37)
MCV RBC AUTO: 90.6 FL (ref 78–100)
MONOCYTES # BLD: 0.5 K/UL (ref 0.05–1.2)
MONOCYTES NFR BLD: 8 % (ref 3–10)
NEUTS SEG # BLD: 4.1 K/UL (ref 1.8–8)
NEUTS SEG NFR BLD: 60 % (ref 40–73)
NRBC # BLD: 0 K/UL (ref 0–0.01)
NRBC BLD-RTO: 0 PER 100 WBC
PLATELET # BLD AUTO: 250 K/UL (ref 135–420)
PMV BLD AUTO: 9.7 FL (ref 9.2–11.8)
RBC # BLD AUTO: 3.85 M/UL (ref 4.2–5.3)
WBC # BLD AUTO: 6.8 K/UL (ref 4.6–13.2)

## 2022-12-24 PROCEDURE — 99232 SBSQ HOSP IP/OBS MODERATE 35: CPT | Performed by: HOSPITALIST

## 2022-12-24 PROCEDURE — 73718 MRI LOWER EXTREMITY W/O DYE: CPT

## 2022-12-24 PROCEDURE — 74011250637 HC RX REV CODE- 250/637: Performed by: STUDENT IN AN ORGANIZED HEALTH CARE EDUCATION/TRAINING PROGRAM

## 2022-12-24 PROCEDURE — 2709999900 HC NON-CHARGEABLE SUPPLY

## 2022-12-24 PROCEDURE — 85730 THROMBOPLASTIN TIME PARTIAL: CPT

## 2022-12-24 PROCEDURE — 65270000029 HC RM PRIVATE

## 2022-12-24 PROCEDURE — 74011250637 HC RX REV CODE- 250/637: Performed by: HOSPITALIST

## 2022-12-24 PROCEDURE — 85025 COMPLETE CBC W/AUTO DIFF WBC: CPT

## 2022-12-24 PROCEDURE — 74011250637 HC RX REV CODE- 250/637: Performed by: SURGERY

## 2022-12-24 PROCEDURE — 36415 COLL VENOUS BLD VENIPUNCTURE: CPT

## 2022-12-24 PROCEDURE — 74011000250 HC RX REV CODE- 250: Performed by: STUDENT IN AN ORGANIZED HEALTH CARE EDUCATION/TRAINING PROGRAM

## 2022-12-24 PROCEDURE — 74011250636 HC RX REV CODE- 250/636: Performed by: STUDENT IN AN ORGANIZED HEALTH CARE EDUCATION/TRAINING PROGRAM

## 2022-12-24 RX ORDER — MORPHINE SULFATE 2 MG/ML
2 INJECTION, SOLUTION INTRAMUSCULAR; INTRAVENOUS ONCE
Status: COMPLETED | OUTPATIENT
Start: 2022-12-24 | End: 2022-12-24

## 2022-12-24 RX ADMIN — ACETAMINOPHEN 650 MG: 325 TABLET, FILM COATED ORAL at 13:54

## 2022-12-24 RX ADMIN — APIXABAN 5 MG: 5 TABLET, FILM COATED ORAL at 17:44

## 2022-12-24 RX ADMIN — AMLODIPINE BESYLATE 10 MG: 10 TABLET ORAL at 08:40

## 2022-12-24 RX ADMIN — ABACAVIR SULFATE, DOLUTEGRAVIR SODIUM, LAMIVUDINE 1 TABLET: 600; 50; 300 TABLET, FILM COATED ORAL at 09:00

## 2022-12-24 RX ADMIN — MORPHINE SULFATE 2 MG: 2 INJECTION, SOLUTION INTRAMUSCULAR; INTRAVENOUS at 01:12

## 2022-12-24 RX ADMIN — POTASSIUM CHLORIDE 10 MEQ: 750 TABLET, EXTENDED RELEASE ORAL at 08:40

## 2022-12-24 RX ADMIN — SODIUM CHLORIDE, PRESERVATIVE FREE 10 ML: 5 INJECTION INTRAVENOUS at 07:03

## 2022-12-24 RX ADMIN — APIXABAN 5 MG: 5 TABLET, FILM COATED ORAL at 08:40

## 2022-12-24 NOTE — PROGRESS NOTES
Vascular Surgery Progress Note    Admit Date: 2022  POD 1 Day Post-Op    Procedure:  Procedure(s):  ANGIOGRAPHY LOWER EXT LEFT      Subjective:     Patient used to have pain in the left foot, along with numbness. She has not tried walking. However she is not in acute distress. She is comfortably sitting in the chair. Objective:     Blood pressure (!) 148/62, pulse 87, temperature 97.7 °F (36.5 °C), resp. rate 16, height 5' 6\" (1.676 m), weight 216 lb (98 kg), SpO2 98 %, not currently breastfeeding. Temp (24hrs), Av.8 °F (36.6 °C), Min:97.2 °F (36.2 °C), Max:99.2 °F (37.3 °C)      Physical Exam:      Alert oriented x3. Left groin access site: Clean, no bleeding or hematoma. Left femoral pulse is now well palpable. Left foot is warm with good venous refill. Still has tenderness of the left forefoot. No swelling or tenderness of the left leg. Labs: Results:       Chemistry Recent Labs     22  2105   GLU 79 100*    139   K 4.0 3.9    109   CO2 26 27   BUN 18 20*   CREA 0.85 0.94   CA 9.2 9.3   AGAP 8 3   BUCR 21* 21*      CBC w/Diff Recent Labs     22  0345   WBC 6.8 6.4 6.0   RBC 3.85* 3.92* 4.25   HGB 11.2* 11.3* 12.0   HCT 34.9* 34.4* 37.0    249 271   GRANS 60 48 44   LYMPH 30 42 45   EOS 2 3 4      Microbiology No results for input(s): CULT in the last 72 hours. Coagulation Recent Labs     22   APTT 28.7 >180.0*         Data Review: images and reports reviewed    Assessment:     Active Problems:    Limb ischemia (2022)      Current tobacco use (2022)      Primary hypertension (2022)      Obesity (BMI 30-39.9) (2022)        Plan/Recommendations/Medical Decision Making:     Continue present treatment    Suggest MRI of the left foot, to rule out any significant bony or soft tissue injury as  cause of her pain.

## 2022-12-24 NOTE — PROGRESS NOTES
Bedside and Verbal shift change  Received from Wilman Sanchez RN (outgoing nurse), to HAILEY Noe (oncoming)  Pt. Is AOX 3. IV SL, Pt. denies  pain at this time. Report included the following information SBAR, Kardex, Procedure Summary, Intake/Output, MAR, Recent Lab Results. Will resume care and monitor Pt. Condition. Pt. Educated on call bell when in need of help and assistance. Pt. verbalized understanding. Bed alarm on.    1950  Pt. Head to toe Assessment Done and documented. Pt. Given bath/back care, incontinent/tejas care, changed gown, pads, purewick and linen. 2126  Pt. LLE pain, given tylenol. 2200  Pt. Made no complaints. 2300 watching tv.    Chuck Machado  Notified Dr Gama Rose of LLE pain. MD ordered morphine. 0200 Pt. Resting comfortably, denies pain. 0400  Pt able to rest and sleep well throughout the shift.    0600  Pt. Able to rest and sleep well throughout the shift. Verbal and bedside Shift changed report given to Hieu Johnson RN (oncoming RN) on Pt. Condition. Report consisted of patients Situation, History, Activities, intake/output,  Background, Assessment and Recommendations(SBAR). Information from the following report(s) Kardex, order Summary, Lab results and MAR was reviewed with the receiving nurse. Opportunity for questions and clarification was provided.

## 2022-12-24 NOTE — PROGRESS NOTES
Problem: Falls - Risk of  Goal: *Absence of Falls  Description: Document Ingrid Mckeon Fall Risk and appropriate interventions in the flowsheet.   Outcome: Progressing Towards Goal  Note: Fall Risk Interventions:  Mobility Interventions: Bed/chair exit alarm, Patient to call before getting OOB         Medication Interventions: Evaluate medications/consider consulting pharmacy, Bed/chair exit alarm    Elimination Interventions: Bed/chair exit alarm, Patient to call for help with toileting needs              Problem: Patient Education: Go to Patient Education Activity  Goal: Patient/Family Education  Outcome: Progressing Towards Goal     Problem: Pain  Goal: *Control of Pain  Outcome: Progressing Towards Goal

## 2022-12-24 NOTE — PROGRESS NOTES
Problem: Falls - Risk of  Goal: *Absence of Falls  Description: Document Clearence Skates Fall Risk and appropriate interventions in the flowsheet.   Outcome: Progressing Towards Goal  Note: Fall Risk Interventions:  Mobility Interventions: Patient to call before getting OOB         Medication Interventions: Evaluate medications/consider consulting pharmacy                   Problem: Patient Education: Go to Patient Education Activity  Goal: Patient/Family Education  Outcome: Progressing Towards Goal     Problem: Pain  Goal: *Control of Pain  Outcome: Progressing Towards Goal     Problem: Patient Education: Go to Patient Education Activity  Goal: Patient/Family Education  Outcome: Progressing Towards Goal

## 2022-12-24 NOTE — PROGRESS NOTES
[facility]  Progress Note    Patient: Bart Kelly Age: 62 y.o. : 1965 MR#: [id number] SSN: [id number]  Date/Time: 2022     Subjective:     Patient seen and evaluated, sitting up in recliner, no acute distress. 27-year-old female with a past medical history of tobacco use, hypertension presents to the emergency room secondary to left leg pain. ER evaluation-patient noted to have acute left lower extremity ischemia, vascular surgery consulted, recommended heparin GTT. Patient admitted to the hospital for further evaluation. -patient underwent aortoiliac angiogram with left lower extremity runoff through the left common femoral artery sheath. Patient tolerated procedure well. Vascular surgery notes reviewed. Discussed with vascular surgery, continue Eliquis 5 mg twice daily. -patient seen and evaluated, sitting up in recliner, no acute distress. Patient continues to complain of pain in the left throat along with numbness. Vascular surgery recommended MRI of left foot, will follow. Assessment/Plan:     Acute left lower extremity ischemia - patient underwent aortoiliac angiogram with left lower extremity runoff through the left common femoral artery sheath.  Continue Eliquis BID  History of hypertension-resume home medications  History of HIV-continue Triumeq  DVT prophylaxis - Eliquis   Full code                Balaji Givens MD  22      Case discussed with:  [x]Patient  []Family  [x]Nursing  []Case Management  DVT Prophylaxis:  []Lovenox  []Hep SQ  []SCDs  []Coumadin   [x]On Heparin gtt    Objective:   VS: Visit Vitals  BP (!) 148/62 (BP 1 Location: Right upper arm, BP Patient Position: At rest)   Pulse 87   Temp 97.7 °F (36.5 °C)   Resp 16   Ht 5' 6\" (1.676 m)   Wt 98 kg (216 lb)   SpO2 98%   Breastfeeding No   BMI 34.86 kg/m²        Tmax/24hrs: Temp (24hrs), Av.8 °F (36.6 °C), Min:97.2 °F (36.2 °C), Max:99.2 °F (37.3 °C)  [facility] intake or output data in the 24 hours ending 12/24/22 1246      General:  Alert, cooperative, no acute distress    Pulmonary:  CTA Bilaterally. No Wheezing/Rhonchi/Rales. Cardiovascular: Regular rate and Rhythm. GI:  Soft, Non distended, Non tender. + Bowel sounds. Extremities: Left leg pain and decreased pulses left lower extremity  Neurologic: Alert and oriented X 3. No acute neuro deficits. Additional:    Medications:   Current Facility-Administered Medications   Medication Dose Route Frequency    apixaban (ELIQUIS) tablet 5 mg  5 mg Oral BID    cholecalciferol (VITAMIN D3) wafer 50,000 Units  50,000 Units Oral Q7D    potassium chloride (KLOR-CON M10) tablet 10 mEq  10 mEq Oral DAILY    sodium chloride (NS) flush 5-40 mL  5-40 mL IntraVENous Q8H    sodium chloride (NS) flush 5-40 mL  5-40 mL IntraVENous PRN    acetaminophen (TYLENOL) tablet 650 mg  650 mg Oral Q6H PRN    Or    acetaminophen (TYLENOL) suppository 650 mg  650 mg Rectal Q6H PRN    polyethylene glycol (MIRALAX) packet 17 g  17 g Oral DAILY PRN    ondansetron (ZOFRAN ODT) tablet 4 mg  4 mg Oral Q8H PRN    Or    ondansetron (ZOFRAN) injection 4 mg  4 mg IntraVENous Q6H PRN    amLODIPine (NORVASC) tablet 10 mg  10 mg Oral DAILY    abacavir-dolutegravir-lamiVUDine (TRIUMEQ) 600- mg tablet 1 Tablet  1 Tablet Oral DAILY    hydrALAZINE (APRESOLINE) 20 mg/mL injection 10 mg  10 mg IntraVENous Q6H PRN       Imaging:   XR Results (most recent):  Results from Hospital Encounter encounter on 11/05/21    XR HIP RT W OR WO PELV 2-3 VWS    Narrative  EXAM: HIP TWO VIEWS RIGHT    CLINICAL HISTORY/INDICATION: Right hip pain    COMPARISON: None. TECHNIQUE: AP pelvis and frog view    FINDINGS:    The bony pelvic ring is intact. The right hip joint space is unremarkable. There  is no evidence of fracture or dislocation. Mineralization is normal.    Impression  Negative hip.        CT Results (most recent):  [facility]    CTA CHEST ABD PELV W CONT    Narrative  CTA Chest, Abdomen And Pelvis With Enhancement    INDICATION: Left foot numbness. 3 weeks of tingling, paresthesias, numbness to  left lower extremity digits, worse with weightbearing, smoking history,  hypertension. TECHNIQUE: Axial images obtained from the thoracic inlet to the level of the  pubic symphysis following the uneventful administration of intravenous contrast.  Imaging performed during maximum aortic enhancement and is therefore suboptimal  for evaluating solid organs and bowel. 3-D imaging performed on a separate  workstation. All CT scans at this facility are performed using dose optimization technique as  appropriate to a performed exam, to include automated exposure control,  adjustment of the mA and/or kV according to patient size (including appropriate  matching first site-specific examinations), or use of iterative reconstruction  technique. COMPARISON: 4/30/2009. Thyroid: Unremarkable. Heart: No effusion. Vessels: Pulmonary arteries are patent. Lymph Nodes: Unremarkable. Lung: Mild emphysema. 0.9 x 0.7 cm (0.8 cm average) density in the right  peribronchial upper lobe (series 2, image 42) is similar to increased since  2009. There is unchanged reticulation in the periphery of the left upper lobe  (72). Pleura: Unremarkable. ABDOMEN FINDINGS:  There is suboptimal evaluation of visceral organs secondary to timing of the  exam.    Liver: Unremarkable. Spleen: Unremarkable. Pancreas: Unremarkable. Biliary: Unremarkable. Bowel: Mild diverticulosis. Peritoneum/ Retroperitoneum: Unremarkable. Lymph Nodes: Unremarkable. Adrenal Glands: Bilateral thickening without nodularity. Kidneys: Scarring right upper pole. PELVIS FINDINGS:    Bladder/ Pelvic Organs: Unremarkable. Bones/Soft tissues: Lumbar facet hypertrophy and arthropathy. SI joint  degenerative changes.  Sclerosis at the left pubic symphysis. Degenerative disc  disease. AORTA FINDINGS:  Thoracic aorta is normal in caliber and without acute aortic pathology. 3 vessel  arch anatomy. Moderate stenosis of the left subclavian artery. Mild aortic wall  calcifications greater in the infrarenal aorta. 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 There is mild poststenotic  dilation. Mild SMA stenosis. There is peripheral SMA calcification with at least  moderate stenosis. Mild stenosis left renal artery. Right renal artery is  patent. Severe stenosis right common iliac. Regions of severe stenosis right  internal iliac. Diminutive flow within the right external iliac artery. Severe  stenosis peripheral left common iliac. Severe stenosis origin left external  iliac. Severe stenosis left internal iliac. See dedicated runoff CT for lower  extremity vascular findings. Impression  See separate dedicated CT runoff report. 1.  Moderate stenosis left subclavian artery. 2.  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 Poststenotic dilation suggests hemodynamic significance. 3.  At least moderate stenosis peripheral SMA. 4.  Severe stenosis right common iliac, regions of internal iliac. Diminutive  flow external iliac. 5.  Severe stenosis left common iliac, internal iliac and external iliac. 6.  Mild emphysema. 7.  8 mm pulmonary density right peribronchial upper lobe is similar to  increased since 2009. Similarity over time is reassuring. Recommend 3-6 month  follow-up chest CT. Unchanged reticulation left upper lobe. 8.  Mild diverticulosis. Preliminary report provided by Dr. Yvonne Brothers on 12/22/2022 at [facility] hours prior to  3-D image availability. 12/21/22    ECHO ADULT COMPLETE 12/22/2022 12/22/2022    Interpretation Summary    Left Ventricle: Normal left ventricular systolic function with a visually estimated EF of 60 - 65%.  Left ventricle size is normal. LVIDd is 4. 4 cm. Mildly increased wall thickness. Findings consistent with mild concentric hypertrophy. Normal wall motion. Indeterminate diastolic function. Right Ventricle: Right ventricle size is normal. Normal systolic function. TAPSE is 2.3 cm. Tricuspid Valve: Unable to assess RVSP due to inadequate or insignificant tricuspid regurgitation. Signed by: Jeff Murray DO on 12/22/2022 12:35 PM       MRI Results (most recent):  No results found for this or any previous visit. Labs:    Recent Results (from the past 48 hour(s))   TYPE & SCREEN    Collection Time: 12/22/22  1:38 PM   Result Value Ref Range    Crossmatch Expiration 12/25/2022,2359     ABO/Rh(D) Gela Riki POSITIVE     Antibody screen NEG    PTT    Collection Time: 12/22/22  2:42 PM   Result Value Ref Range    aPTT 28.2 23.0 - 36.4 SEC   CBC WITH AUTOMATED DIFF    Collection Time: 12/23/22 12:17 AM   Result Value Ref Range    WBC 6.4 4.6 - 13.2 K/uL    RBC 3.92 (L) 4.20 - 5.30 M/uL    HGB 11.3 (L) 12.0 - 16.0 g/dL    HCT 34.4 (L) 35.0 - 45.0 %    MCV 87.8 78.0 - 100.0 FL    MCH 28.8 24.0 - 34.0 PG    MCHC 32.8 31.0 - 37.0 g/dL    RDW 14.3 11.6 - 14.5 %    PLATELET 138 490 - 662 K/uL    MPV 10.1 9.2 - 11.8 FL    NRBC 0.0 0  WBC    ABSOLUTE NRBC 0.00 0.00 - 0.01 K/uL    NEUTROPHILS 48 40 - 73 %    LYMPHOCYTES 42 21 - 52 %    MONOCYTES 6 3 - 10 %    EOSINOPHILS 3 0 - 5 %    BASOPHILS 1 0 - 2 %    IMMATURE GRANULOCYTES 1 (H) 0.0 - 0.5 %    ABS. NEUTROPHILS 3.0 1.8 - 8.0 K/UL    ABS. LYMPHOCYTES 2.7 0.9 - 3.6 K/UL    ABS. MONOCYTES 0.4 0.05 - 1.2 K/UL    ABS. EOSINOPHILS 0.2 0.0 - 0.4 K/UL    ABS. BASOPHILS 0.1 0.0 - 0.1 K/UL    ABS. IMM.  GRANS. 0.0 0.00 - 0.04 K/UL    DF AUTOMATED     METABOLIC PANEL, BASIC    Collection Time: 12/23/22 12:17 AM   Result Value Ref Range    Sodium 141 136 - 145 mmol/L    Potassium 4.0 3.5 - 5.5 mmol/L    Chloride 107 100 - 111 mmol/L    CO2 26 21 - 32 mmol/L    Anion gap 8 3.0 - 18 mmol/L    Glucose 79 74 - 99 mg/dL BUN 18 7.0 - 18 MG/DL    Creatinine 0.85 0.6 - 1.3 MG/DL    BUN/Creatinine ratio 21 (H) 12 - 20      eGFR >60 >60 ml/min/1.73m2    Calcium 9.2 8.5 - 10.1 MG/DL   PTT    Collection Time: 12/23/22 12:17 AM   Result Value Ref Range    aPTT >180.0 (HH) 23.0 - 36.4 SEC   POC ACTIVATED CLOTTING TIME    Collection Time: 12/23/22  8:40 AM   Result Value Ref Range    Activated Clotting Time (POC) 167 (H) 79 - 138 SECS   POC ACTIVATED CLOTTING TIME    Collection Time: 12/23/22  9:03 AM   Result Value Ref Range    Activated Clotting Time (POC) 233 (H) 79 - 138 SECS   PTT    Collection Time: 12/24/22  3:13 AM   Result Value Ref Range    aPTT 28.7 23.0 - 36.4 SEC   CBC WITH AUTOMATED DIFF    Collection Time: 12/24/22  3:13 AM   Result Value Ref Range    WBC 6.8 4.6 - 13.2 K/uL    RBC 3.85 (L) 4.20 - 5.30 M/uL    HGB 11.2 (L) 12.0 - 16.0 g/dL    HCT 34.9 (L) 35.0 - 45.0 %    MCV 90.6 78.0 - 100.0 FL    MCH 29.1 24.0 - 34.0 PG    MCHC 32.1 31.0 - 37.0 g/dL    RDW 14.3 11.6 - 14.5 %    PLATELET 584 538 - 321 K/uL    MPV 9.7 9.2 - 11.8 FL    NRBC 0.0 0  WBC    ABSOLUTE NRBC 0.00 0.00 - 0.01 K/uL    NEUTROPHILS 60 40 - 73 %    LYMPHOCYTES 30 21 - 52 %    MONOCYTES 8 3 - 10 %    EOSINOPHILS 2 0 - 5 %    BASOPHILS 0 0 - 2 %    IMMATURE GRANULOCYTES 0 0.0 - 0.5 %    ABS. NEUTROPHILS 4.1 1.8 - 8.0 K/UL    ABS. LYMPHOCYTES 2.0 0.9 - 3.6 K/UL    ABS. MONOCYTES 0.5 0.05 - 1.2 K/UL    ABS. EOSINOPHILS 0.2 0.0 - 0.4 K/UL    ABS. BASOPHILS 0.0 0.0 - 0.1 K/UL    ABS. IMM. GRANS. 0.0 0.00 - 0.04 K/UL    DF AUTOMATED         Signed By: Philip Tellez MD     December 24, 2022      I spent 25 minutes with the patient in face-to-face consultation, of which greater than 50% was spent in counseling and coordination of care as described above    Disclaimer: Sections of this note are dictated using utilizing voice recognition software. Minor typographical errors may be present.  If questions arise, please do not hesitate to contact me or call our department.

## 2022-12-25 VITALS
HEIGHT: 66 IN | SYSTOLIC BLOOD PRESSURE: 138 MMHG | DIASTOLIC BLOOD PRESSURE: 70 MMHG | RESPIRATION RATE: 17 BRPM | WEIGHT: 214.4 LBS | BODY MASS INDEX: 34.46 KG/M2 | HEART RATE: 89 BPM | TEMPERATURE: 97.5 F | OXYGEN SATURATION: 94 %

## 2022-12-25 PROCEDURE — 74011250637 HC RX REV CODE- 250/637: Performed by: SURGERY

## 2022-12-25 PROCEDURE — 74011000250 HC RX REV CODE- 250: Performed by: STUDENT IN AN ORGANIZED HEALTH CARE EDUCATION/TRAINING PROGRAM

## 2022-12-25 PROCEDURE — 74011250637 HC RX REV CODE- 250/637: Performed by: STUDENT IN AN ORGANIZED HEALTH CARE EDUCATION/TRAINING PROGRAM

## 2022-12-25 PROCEDURE — 74011250637 HC RX REV CODE- 250/637: Performed by: HOSPITALIST

## 2022-12-25 PROCEDURE — 74011250637 HC RX REV CODE- 250/637: Performed by: INTERNAL MEDICINE

## 2022-12-25 RX ORDER — GABAPENTIN 300 MG/1
300 CAPSULE ORAL 3 TIMES DAILY
Qty: 1 CAPSULE | Refills: 0 | Status: SHIPPED | OUTPATIENT
Start: 2022-12-25

## 2022-12-25 RX ORDER — GABAPENTIN 300 MG/1
300 CAPSULE ORAL ONCE
Status: COMPLETED | OUTPATIENT
Start: 2022-12-25 | End: 2022-12-25

## 2022-12-25 RX ADMIN — POTASSIUM CHLORIDE 10 MEQ: 750 TABLET, EXTENDED RELEASE ORAL at 08:18

## 2022-12-25 RX ADMIN — ABACAVIR SULFATE, DOLUTEGRAVIR SODIUM, LAMIVUDINE 1 TABLET: 600; 50; 300 TABLET, FILM COATED ORAL at 09:00

## 2022-12-25 RX ADMIN — AMLODIPINE BESYLATE 10 MG: 10 TABLET ORAL at 08:18

## 2022-12-25 RX ADMIN — GABAPENTIN 300 MG: 300 CAPSULE ORAL at 15:00

## 2022-12-25 RX ADMIN — ACETAMINOPHEN 650 MG: 325 TABLET, FILM COATED ORAL at 12:18

## 2022-12-25 RX ADMIN — SODIUM CHLORIDE, PRESERVATIVE FREE 10 ML: 5 INJECTION INTRAVENOUS at 05:11

## 2022-12-25 RX ADMIN — SODIUM CHLORIDE, PRESERVATIVE FREE 10 ML: 5 INJECTION INTRAVENOUS at 02:33

## 2022-12-25 RX ADMIN — APIXABAN 5 MG: 5 TABLET, FILM COATED ORAL at 08:18

## 2022-12-25 NOTE — PROGRESS NOTES
Problem: Falls - Risk of  Goal: *Absence of Falls  Description: Document Krissy Martinez Fall Risk and appropriate interventions in the flowsheet. Outcome: Progressing Towards Goal  Note: Fall Risk Interventions:  Mobility Interventions: Assess mobility with egress test, Bed/chair exit alarm, Communicate number of staff needed for ambulation/transfer, OT consult for ADLs, Patient to call before getting OOB, PT Consult for mobility concerns, PT Consult for assist device competence, Strengthening exercises (ROM-active/passive), Utilize walker, cane, or other assistive device         Medication Interventions: Bed/chair exit alarm, Evaluate medications/consider consulting pharmacy, Patient to call before getting OOB, Teach patient to arise slowly    Elimination Interventions: Bed/chair exit alarm, Call light in reach, Patient to call for help with toileting needs, Stay With Me (per policy), Toilet paper/wipes in reach, Toileting schedule/hourly rounds              Problem: Patient Education: Go to Patient Education Activity  Goal: Patient/Family Education  Outcome: Progressing Towards Goal     Problem: Pain  Goal: *Control of Pain  Outcome: Progressing Towards Goal     Problem: Patient Education: Go to Patient Education Activity  Goal: Patient/Family Education  Outcome: Progressing Towards Goal     Problem: Pressure Injury - Risk of  Goal: *Prevention of pressure injury  Description: Document Juan Scale and appropriate interventions in the flowsheet.   Outcome: Progressing Towards Goal  Note: Pressure Injury Interventions:  Sensory Interventions: Keep linens dry and wrinkle-free    Moisture Interventions: Absorbent underpads, Apply protective barrier, creams and emollients, Assess need for specialty bed    Activity Interventions: Pressure redistribution bed/mattress(bed type)    Mobility Interventions: Assess need for specialty bed, PT/OT evaluation, Pressure redistribution bed/mattress (bed type), Turn and reposition approx.  every two hours(pillow and wedges)    Nutrition Interventions: Document food/fluid/supplement intake                     Problem: Patient Education: Go to Patient Education Activity  Goal: Patient/Family Education  Outcome: Progressing Towards Goal

## 2022-12-25 NOTE — DISCHARGE SUMMARY
[facility]  [facility] Summary       Patient: Santosh Ordaz Age: 62 y.o. : 1965 MR#: [id number] SSN: [id number]  PCP on record: Priyank Tapia MD  Admit date: 2022  Discharge date: 2022    Consults:  -CATIA Martínez,-vascular surgery  -   Procedures:  -   on 22:  Pre-Op Dx : Bilateral lower extremity arterial insufficiency with ischemic rest pain left lower extremity  Procedure : Aortoiliac angiogram with left lower extremity runoff performed through the left common femoral artery sheath  Surgeon : Keisha Sunshine MD  Assistant: None  Anesthesia : Con-Sed: Versed 2mg, Fentanyl 150 mics  Findings :  Patent distal abdominal aorta   2. There is a 70 to 80% stenosis of the right common iliac artery, from its origin. The right external and internal iliac arteries are patent. The right common femoral artery the proximal right profunda femoris and the proximal superficial femoral artery are patent. 3.  In the left distal common iliac artery extending onto the proximal external there is a mixed focal 1cm 99% stenosis. Occluded left IIA  4. Patent left common femoral artery, left profunda femoris artery. The left superficial femoral artery is underfilled with occlusion of the left SFA in its midportion. Reconstitution of the distal SFA through collaterals. Patent left popliteal artery with 3 vessels runoff -the PT is dominant   5. After balloon angioplasty with 6 mm balloon, there was improvement in the luminal patency of the left iliac arteries stenosis and hence a drug-eluting balloon was used to balloon angioplasty of this lesion-which was very focal, and responded well to balloon angioplasty.   6.  6 [place] Angio-Seal closure of the left common femoral artery access  Significant Diagnostic Studies:   -  CT Results (most recent):  Results from Hospital Encounter encounter on 22    CTA CHEST ABD PELV W CONT    Narrative  CTA Chest, Abdomen And Pelvis With Enhancement    INDICATION: Left foot numbness. 3 weeks of tingling, paresthesias, numbness to  left lower extremity digits, worse with weightbearing, smoking history,  hypertension. TECHNIQUE: Axial images obtained from the thoracic inlet to the level of the  pubic symphysis following the uneventful administration of intravenous contrast.  Imaging performed during maximum aortic enhancement and is therefore suboptimal  for evaluating solid organs and bowel. 3-D imaging performed on a separate  workstation. All CT scans at this facility are performed using dose optimization technique as  appropriate to a performed exam, to include automated exposure control,  adjustment of the mA and/or kV according to patient size (including appropriate  matching first site-specific examinations), or use of iterative reconstruction  technique. COMPARISON: 4/30/2009. Thyroid: Unremarkable. Heart: No effusion. Vessels: Pulmonary arteries are patent. Lymph Nodes: Unremarkable. Lung: Mild emphysema. 0.9 x 0.7 cm (0.8 cm average) density in the right  peribronchial upper lobe (series 2, image 42) is similar to increased since  2009. There is unchanged reticulation in the periphery of the left upper lobe  (72). Pleura: Unremarkable. ABDOMEN FINDINGS:  There is suboptimal evaluation of visceral organs secondary to timing of the  exam.    Liver: Unremarkable. Spleen: Unremarkable. Pancreas: Unremarkable. Biliary: Unremarkable. Bowel: Mild diverticulosis. Peritoneum/ Retroperitoneum: Unremarkable. Lymph Nodes: Unremarkable. Adrenal Glands: Bilateral thickening without nodularity. Kidneys: Scarring right upper pole. PELVIS FINDINGS:    Bladder/ Pelvic Organs: Unremarkable. Bones/Soft tissues: Lumbar facet hypertrophy and arthropathy. SI joint  degenerative changes. Sclerosis at the left pubic symphysis. Degenerative disc  disease.       AORTA FINDINGS:  Thoracic aorta is normal in caliber and without acute aortic pathology. 3 vessel  arch anatomy. Moderate stenosis of the left subclavian artery. Mild aortic wall  calcifications greater in the infrarenal aorta. 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 There is mild poststenotic  dilation. Mild SMA stenosis. There is peripheral SMA calcification with at least  moderate stenosis. Mild stenosis left renal artery. Right renal artery is  patent. Severe stenosis right common iliac. Regions of severe stenosis right  internal iliac. Diminutive flow within the right external iliac artery. Severe  stenosis peripheral left common iliac. Severe stenosis origin left external  iliac. Severe stenosis left internal iliac. See dedicated runoff CT for lower  extremity vascular findings. Impression  See separate dedicated CT runoff report. 1.  Moderate stenosis left subclavian artery. 2.  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 Poststenotic dilation suggests hemodynamic significance. 3.  At least moderate stenosis peripheral SMA. 4.  Severe stenosis right common iliac, regions of internal iliac. Diminutive  flow external iliac. 5.  Severe stenosis left common iliac, internal iliac and external iliac. 6.  Mild emphysema. 7.  8 mm pulmonary density right peribronchial upper lobe is similar to  increased since 2009. Similarity over time is reassuring. Recommend 3-6 month  follow-up chest CT. Unchanged reticulation left upper lobe. 8.  Mild diverticulosis. Preliminary report provided by Dr. Mable Root on 12/22/2022 at [facility] hours prior to  3-D image availability. MRI Results (most recent):  Results from [place] encounter on 12/21/22    MRI FOOT LT WO CONT    Narrative  EXAM: MRI FOOT LT WO CONT    CLINICAL INDICATION/HISTORY: 62 years Female. Pain and numbness at unspecified  portion of the foot. MRI to rule out any osseous or soft tissue cause of pain.   Right leg ischemia. Additional History: None    COMPARISON: None    TECHNIQUE: Multiplanar, multisequence MR imaging of the left forefoot without IV  contrast.    FINDINGS:    BONES/JOINTS:  No acute fracture. No evidence of osteonecrosis. Cornuate navicular bone. Mild  first MTP joint degenerative changes characterized by chondral thinning and  small marginal osteophytes. Mild and calcaneal cuboid and naviculocuneiform  marginal spurring. No erosions or marrow replacement. No evidence of acute  osteomyelitis. TENDONS:  Unremarkable. No tendon tear detected. No significant tenosynovitis. LIGAMENTS:  Grossly intact Lisfranc ligament is complex. Grossly intact plantar plates and  MTP joint collateral ligaments. VISUALIZED PLANTAR FASCIA:  Unremarkable. MUSCULATURE:  Focal fatty atrophy of the abductor digit minimi muscle. No additional  disproportionate muscle atrophy. No significant intramuscular edema. INTERMETATARSAL SPACES:  No interdigital neuroma detected. Mild first through third intermetatarsal  bursal distention. SOFT TISSUE/OTHER:  Mild nonspecific subcutaneous edema involving the second through fifth toes. Mild adventitial bursitis along the plantar aspect of the fifth metatarsal head. No soft tissue mass detected. Impression  1. No acute finding detected to account for the patient's symptoms. No acute  fracture. No evidence of acute osteomyelitis or osteonecrosis. 2.  Mild adventitial bursitis along the plantar aspect of the fifth metatarsal  head. 3.  Mild first MTP joint and mild to moderate midfoot degenerative changes. 4.  Focal atrophy the of the abductor digiti minimi muscle, which can be seen  with chronic Kan's neuropathy. 5.  No soft tissue mass detected; however it there is further localizing history  or clinical concern for a focal mass, a repeat examination could be performed  with fiducial markers.       Discharge Diagnoses: -Acute lower extremity ischemia  -HTN Patient Active Problem List   Diagnosis Code    Limb ischemia I99.8    Current tobacco use Z72.0    Primary hypertension I10    Obesity (BMI 30-39. 9) E66.9       Hospital Course by Problem   62year old female with history of tobacco use and htn admitted after she presented to the ED w/ c/o left big toe pain for greater than 3 weeks prior to her presentation. Admitted with diagnosis of acute left lower extremity ischemia with no prior diagnosis of peripheral vascular disease. He underwent CTA chest abdomen pelvis which showed diffuse vascular disease. She was started on heparin drip, vascular surgery was consulted. Patient underwent aortoiliac angiogram with intervention as detailed above. Started on Eliquis BID patient continued to complain of flank pain and underwent MRI of the left foot which did not show acute findings. Per her description her pain was more consistent with neuro pathic pain and she was started on gabapentin prior to discharge to continue as an outpatient. Patient has been discharged after vascular surgery clearance. Today's examination of the patient revealed:     Subjective:     Objective:   VS: Visit Vitals  /70   Pulse 89   Temp 97.5 °F (36.4 °C)   Resp 17   Ht 5' 6\" (1.676 m)   Wt 97.3 kg (214 lb 6.4 oz)   SpO2 94%   Breastfeeding No   BMI 34.60 kg/m²      Tmax/24hrs: Temp (24hrs), Av.1 °F (36.7 °C), Min:97.5 °F (36.4 °C), Max:98.5 °F (36.9 °C)     Input/Output:   Intake/Output Summary (Last 24 hours) at 2022 1357  Last data filed at 2022 2231  Gross per 24 hour   Intake 130 ml   Output --   Net 130 ml       General: alert, awake, in nad   Cardiovascular:  rrr, no murmurs  Pulmonary:  ctab  GI:  soft, nt  Extremities:  no edma  Additional:      Labs:    No results found for this or any previous visit (from the past 24 hour(s)).   Additional Data Reviewed:     Condition on discharge: stable   Disposition:    [x]Home   []Home with Home Health   []SNF/NH   []Rehab   []Home with family   []Alternate Facility:____________________      Discharge Medications:     Current Discharge Medication List        START taking these medications    Details   apixaban (ELIQUIS) 5 mg tablet Take 1 Tablet by mouth two (2) times a day for 30 days. Qty: 60 Tablet, Refills: 0      gabapentin (NEURONTIN) 300 mg capsule Take 1 Capsule by mouth three (3) times daily. Max Daily Amount: 900 mg. Indications: neuropathic pain  Qty: 1 Capsule, Refills: 0    Associated Diagnoses: Limb ischemia           CONTINUE these medications which have NOT CHANGED    Details   acetaminophen (TYLENOL) 500 mg tablet Take 650 mg by mouth every six (6) hours as needed. amLODIPine (NORVASC) 5 mg tablet Take 5 mg by mouth daily. cholecalciferol (VITAMIN D3) (50,000 UNITS /1250 MCG) capsule Take 1 Capsule by mouth daily. potassium chloride SR (KLOR-CON 10) 10 mEq tablet Take 10 mEq by mouth daily. Triumeq tablet Take 1 Tablet by mouth daily. methocarbamol (ROBAXIN) 500 mg tablet Take 1 Tab by mouth four (4) times daily. Qty: 20 Tab, Refills: 0               Follow-up Appointments:   1. Your PCP: Nakul Fermin MD, within 7-10days  2.  Vascular surgery in 1-2 wks    >30 minutes spent coordinating this discharge (review instructions/follow-up, prescriptions, preparing report for sign off)    Signed:  May Laguerre MD  12/25/2022  1:57 PM

## 2022-12-25 NOTE — PROGRESS NOTES
Problem: Falls - Risk of  Goal: *Absence of Falls  Description: Document Kade Aguilar Fall Risk and appropriate interventions in the flowsheet. 12/25/2022 1535 by Leni Moses RN  Outcome: Resolved/Met  12/25/2022 1048 by Leni Moses RN  Outcome: Progressing Towards Goal  Note: Fall Risk Interventions:  Mobility Interventions: Assess mobility with egress test, Bed/chair exit alarm, Communicate number of staff needed for ambulation/transfer, OT consult for ADLs, Patient to call before getting OOB, PT Consult for mobility concerns, PT Consult for assist device competence, Strengthening exercises (ROM-active/passive), Utilize walker, cane, or other assistive device         Medication Interventions: Bed/chair exit alarm, Evaluate medications/consider consulting pharmacy, Patient to call before getting OOB, Teach patient to arise slowly    Elimination Interventions: Bed/chair exit alarm, Call light in reach, Patient to call for help with toileting needs, Stay With Me (per policy), Toilet paper/wipes in reach, Toileting schedule/hourly rounds              Problem: Patient Education: Go to Patient Education Activity  Goal: Patient/Family Education  12/25/2022 1535 by Leni Moses RN  Outcome: Resolved/Met  12/25/2022 1048 by Leni Moses RN  Outcome: Progressing Towards Goal     Problem: Pain  Goal: *Control of Pain  12/25/2022 1535 by Leni Moses RN  Outcome: Resolved/Met  12/25/2022 1048 by Leni Moses RN  Outcome: Progressing Towards Goal     Problem: Pressure Injury - Risk of  Goal: *Prevention of pressure injury  Description: Document Juan Scale and appropriate interventions in the flowsheet.   12/25/2022 1535 by Leni Moses RN  Outcome: Resolved/Met  12/25/2022 1048 by Leni Moses RN  Outcome: Progressing Towards Goal  Note: Pressure Injury Interventions:  Sensory Interventions: Keep linens dry and wrinkle-free    Moisture Interventions: Absorbent underpads, Apply protective barrier, creams and emollients, Assess need for specialty bed    Activity Interventions: Pressure redistribution bed/mattress(bed type)    Mobility Interventions: Assess need for specialty bed, PT/OT evaluation, Pressure redistribution bed/mattress (bed type), Turn and reposition approx. every two hours(pillow and wedges)    Nutrition Interventions: Document food/fluid/supplement intake                     Problem: Pressure Injury - Risk of  Goal: *Prevention of pressure injury  Description: Document Juan Scale and appropriate interventions in the flowsheet. 12/25/2022 1535 by Nery Grijalva RN  Outcome: Resolved/Met  12/25/2022 1048 by Nery Grijalva RN  Outcome: Progressing Towards Goal  Note: Pressure Injury Interventions:  Sensory Interventions: Keep linens dry and wrinkle-free    Moisture Interventions: Absorbent underpads, Apply protective barrier, creams and emollients, Assess need for specialty bed    Activity Interventions: Pressure redistribution bed/mattress(bed type)    Mobility Interventions: Assess need for specialty bed, PT/OT evaluation, Pressure redistribution bed/mattress (bed type), Turn and reposition approx.  every two hours(pillow and wedges)    Nutrition Interventions: Document food/fluid/supplement intake                     Problem: Patient Education: Go to Patient Education Activity  Goal: Patient/Family Education  12/25/2022 1535 by Nery Grijalva RN  Outcome: Resolved/Met  12/25/2022 1048 by Nery Grijalva RN  Outcome: Progressing Towards Goal

## 2023-03-17 ENCOUNTER — HOSPITAL ENCOUNTER (INPATIENT)
Facility: HOSPITAL | Age: 58
LOS: 4 days | Discharge: HOME HEALTH | DRG: 024 | End: 2023-03-22
Attending: EMERGENCY MEDICINE | Admitting: INTERNAL MEDICINE
Payer: MEDICAID

## 2023-03-17 ENCOUNTER — APPOINTMENT (OUTPATIENT)
Facility: HOSPITAL | Age: 58
DRG: 024 | End: 2023-03-17
Payer: MEDICAID

## 2023-03-17 DIAGNOSIS — I65.21 STENOSIS OF RIGHT CAROTID ARTERY: ICD-10-CM

## 2023-03-17 DIAGNOSIS — I63.9 ACUTE STROKE DUE TO ISCHEMIA (HCC): ICD-10-CM

## 2023-03-17 DIAGNOSIS — R20.0 NUMBNESS: ICD-10-CM

## 2023-03-17 DIAGNOSIS — I63.9 ACUTE ISCHEMIC STROKE (HCC): ICD-10-CM

## 2023-03-17 DIAGNOSIS — N39.0 URINARY TRACT INFECTION WITHOUT HEMATURIA, SITE UNSPECIFIED: Primary | ICD-10-CM

## 2023-03-17 DIAGNOSIS — R20.0 NUMBNESS IN FEET: ICD-10-CM

## 2023-03-17 LAB
ALBUMIN SERPL-MCNC: 3.3 G/DL (ref 3.4–5)
ALBUMIN/GLOB SERPL: 0.8 (ref 0.8–1.7)
ALP SERPL-CCNC: 124 U/L (ref 45–117)
ALT SERPL-CCNC: 22 U/L (ref 13–56)
ANION GAP SERPL CALC-SCNC: 4 MMOL/L (ref 3–18)
APTT PPP: 27.2 SEC (ref 23–36.4)
AST SERPL-CCNC: 15 U/L (ref 10–38)
BASOPHILS # BLD: 0.1 K/UL (ref 0–0.1)
BASOPHILS NFR BLD: 1 % (ref 0–2)
BILIRUB SERPL-MCNC: 0.2 MG/DL (ref 0.2–1)
BUN SERPL-MCNC: 26 MG/DL (ref 7–18)
BUN/CREAT SERPL: 31 (ref 12–20)
CALCIUM SERPL-MCNC: 8.7 MG/DL (ref 8.5–10.1)
CHLORIDE SERPL-SCNC: 109 MMOL/L (ref 100–111)
CO2 SERPL-SCNC: 25 MMOL/L (ref 21–32)
CREAT SERPL-MCNC: 0.85 MG/DL (ref 0.6–1.3)
DIFFERENTIAL METHOD BLD: ABNORMAL
EOSINOPHIL # BLD: 0.2 K/UL (ref 0–0.4)
EOSINOPHIL NFR BLD: 3 % (ref 0–5)
ERYTHROCYTE [DISTWIDTH] IN BLOOD BY AUTOMATED COUNT: 14.1 % (ref 11.6–14.5)
GLOBULIN SER CALC-MCNC: 4.4 G/DL (ref 2–4)
GLUCOSE BLD STRIP.AUTO-MCNC: 128 MG/DL (ref 70–110)
GLUCOSE SERPL-MCNC: 135 MG/DL (ref 74–99)
HCT VFR BLD AUTO: 35.9 % (ref 35–45)
HGB BLD-MCNC: 11.8 G/DL (ref 12–16)
IMM GRANULOCYTES # BLD AUTO: 0 K/UL (ref 0–0.04)
IMM GRANULOCYTES NFR BLD AUTO: 1 % (ref 0–0.5)
INR PPP: 1 (ref 0.8–1.2)
LYMPHOCYTES # BLD: 2.5 K/UL (ref 0.9–3.6)
LYMPHOCYTES NFR BLD: 43 % (ref 21–52)
MAGNESIUM SERPL-MCNC: 2.4 MG/DL (ref 1.6–2.6)
MCH RBC QN AUTO: 28.1 PG (ref 24–34)
MCHC RBC AUTO-ENTMCNC: 32.9 G/DL (ref 31–37)
MCV RBC AUTO: 85.5 FL (ref 78–100)
MONOCYTES # BLD: 0.4 K/UL (ref 0.05–1.2)
MONOCYTES NFR BLD: 7 % (ref 3–10)
NEUTS SEG # BLD: 2.7 K/UL (ref 1.8–8)
NEUTS SEG NFR BLD: 46 % (ref 40–73)
NRBC # BLD: 0 K/UL (ref 0–0.01)
NRBC BLD-RTO: 0 PER 100 WBC
PLATELET # BLD AUTO: 260 K/UL (ref 135–420)
PMV BLD AUTO: 9.8 FL (ref 9.2–11.8)
POTASSIUM SERPL-SCNC: 3.9 MMOL/L (ref 3.5–5.5)
PROT SERPL-MCNC: 7.7 G/DL (ref 6.4–8.2)
PROTHROMBIN TIME: 13.5 SEC (ref 11.5–15.2)
RBC # BLD AUTO: 4.2 M/UL (ref 4.2–5.3)
SODIUM SERPL-SCNC: 138 MMOL/L (ref 136–145)
WBC # BLD AUTO: 5.9 K/UL (ref 4.6–13.2)

## 2023-03-17 PROCEDURE — 6370000000 HC RX 637 (ALT 250 FOR IP): Performed by: PHYSICIAN ASSISTANT

## 2023-03-17 PROCEDURE — 85730 THROMBOPLASTIN TIME PARTIAL: CPT

## 2023-03-17 PROCEDURE — 99285 EMERGENCY DEPT VISIT HI MDM: CPT

## 2023-03-17 PROCEDURE — 85610 PROTHROMBIN TIME: CPT

## 2023-03-17 PROCEDURE — 82962 GLUCOSE BLOOD TEST: CPT

## 2023-03-17 PROCEDURE — 80053 COMPREHEN METABOLIC PANEL: CPT

## 2023-03-17 PROCEDURE — 87086 URINE CULTURE/COLONY COUNT: CPT

## 2023-03-17 PROCEDURE — 83735 ASSAY OF MAGNESIUM: CPT

## 2023-03-17 PROCEDURE — 70450 CT HEAD/BRAIN W/O DYE: CPT

## 2023-03-17 PROCEDURE — 81001 URINALYSIS AUTO W/SCOPE: CPT

## 2023-03-17 PROCEDURE — 85025 COMPLETE CBC W/AUTO DIFF WBC: CPT

## 2023-03-17 PROCEDURE — 87077 CULTURE AEROBIC IDENTIFY: CPT

## 2023-03-17 PROCEDURE — 87186 SC STD MICRODIL/AGAR DIL: CPT

## 2023-03-17 RX ORDER — GABAPENTIN 300 MG/1
300 CAPSULE ORAL
Status: COMPLETED | OUTPATIENT
Start: 2023-03-17 | End: 2023-03-17

## 2023-03-17 RX ADMIN — GABAPENTIN 300 MG: 300 CAPSULE ORAL at 22:56

## 2023-03-17 ASSESSMENT — PAIN - FUNCTIONAL ASSESSMENT: PAIN_FUNCTIONAL_ASSESSMENT: NONE - DENIES PAIN

## 2023-03-18 ENCOUNTER — APPOINTMENT (OUTPATIENT)
Facility: HOSPITAL | Age: 58
DRG: 024 | End: 2023-03-18
Payer: MEDICAID

## 2023-03-18 PROBLEM — N30.00 ACUTE CYSTITIS: Status: ACTIVE | Noted: 2023-03-18

## 2023-03-18 PROBLEM — Z21 HISTORY OF HIV INFECTION (HCC): Status: ACTIVE | Noted: 2023-03-18

## 2023-03-18 PROBLEM — B20 HISTORY OF HIV INFECTION (HCC): Status: ACTIVE | Noted: 2023-03-18

## 2023-03-18 PROBLEM — Z72.0 CURRENT TOBACCO USE: Status: ACTIVE | Noted: 2022-12-22

## 2023-03-18 PROBLEM — Z86.718 HISTORY OF DVT (DEEP VEIN THROMBOSIS): Status: ACTIVE | Noted: 2023-03-18

## 2023-03-18 PROBLEM — I10 PRIMARY HYPERTENSION: Status: ACTIVE | Noted: 2022-12-22

## 2023-03-18 PROBLEM — I63.9 ACUTE ISCHEMIC STROKE (HCC): Status: ACTIVE | Noted: 2023-03-18

## 2023-03-18 LAB
ANION GAP SERPL CALC-SCNC: 0 MMOL/L (ref 3–18)
APPEARANCE UR: CLEAR
BACTERIA URNS QL MICRO: ABNORMAL /HPF
BILIRUB UR QL: NEGATIVE
BUN SERPL-MCNC: 15 MG/DL (ref 7–18)
BUN/CREAT SERPL: 22 (ref 12–20)
CALCIUM SERPL-MCNC: 8.7 MG/DL (ref 8.5–10.1)
CHLORIDE SERPL-SCNC: 110 MMOL/L (ref 100–111)
CHOLEST SERPL-MCNC: 180 MG/DL
CO2 SERPL-SCNC: 27 MMOL/L (ref 21–32)
COLOR UR: YELLOW
CREAT SERPL-MCNC: 0.69 MG/DL (ref 0.6–1.3)
EPITH CASTS URNS QL MICRO: ABNORMAL /LPF (ref 0–5)
ERYTHROCYTE [DISTWIDTH] IN BLOOD BY AUTOMATED COUNT: 14 % (ref 11.6–14.5)
ERYTHROCYTE [SEDIMENTATION RATE] IN BLOOD: 43 MM/HR (ref 0–30)
EST. AVERAGE GLUCOSE BLD GHB EST-MCNC: 120 MG/DL
GLUCOSE SERPL-MCNC: 115 MG/DL (ref 74–99)
GLUCOSE UR STRIP.AUTO-MCNC: NEGATIVE MG/DL
HBA1C MFR BLD: 5.8 % (ref 4.2–5.6)
HCT VFR BLD AUTO: 37.4 % (ref 35–45)
HDLC SERPL-MCNC: 36 MG/DL (ref 40–60)
HDLC SERPL: 5 (ref 0–5)
HGB BLD-MCNC: 12.1 G/DL (ref 12–16)
HGB UR QL STRIP: NEGATIVE
KETONES UR QL STRIP.AUTO: NEGATIVE MG/DL
LDLC SERPL CALC-MCNC: 118.6 MG/DL (ref 0–100)
LEUKOCYTE ESTERASE UR QL STRIP.AUTO: ABNORMAL
LIPID PANEL: ABNORMAL
MCH RBC QN AUTO: 27.9 PG (ref 24–34)
MCHC RBC AUTO-ENTMCNC: 32.4 G/DL (ref 31–37)
MCV RBC AUTO: 86.2 FL (ref 78–100)
NITRITE UR QL STRIP.AUTO: POSITIVE
NRBC # BLD: 0 K/UL (ref 0–0.01)
NRBC BLD-RTO: 0 PER 100 WBC
PH UR STRIP: 5.5 (ref 5–8)
PLATELET # BLD AUTO: 249 K/UL (ref 135–420)
PMV BLD AUTO: 9.8 FL (ref 9.2–11.8)
POTASSIUM SERPL-SCNC: 4.3 MMOL/L (ref 3.5–5.5)
PROT UR STRIP-MCNC: NEGATIVE MG/DL
RBC # BLD AUTO: 4.34 M/UL (ref 4.2–5.3)
RBC #/AREA URNS HPF: NEGATIVE /HPF (ref 0–5)
SODIUM SERPL-SCNC: 137 MMOL/L (ref 136–145)
SP GR UR REFRACTOMETRY: 1.03 (ref 1–1.03)
TRIGL SERPL-MCNC: 127 MG/DL
TSH SERPL DL<=0.05 MIU/L-ACNC: 1.12 UIU/ML (ref 0.36–3.74)
UROBILINOGEN UR QL STRIP.AUTO: 1 EU/DL (ref 0.2–1)
VLDLC SERPL CALC-MCNC: 25.4 MG/DL
WBC # BLD AUTO: 4.8 K/UL (ref 4.6–13.2)
WBC URNS QL MICRO: ABNORMAL /HPF (ref 0–4)

## 2023-03-18 PROCEDURE — 6360000004 HC RX CONTRAST MEDICATION: Performed by: EMERGENCY MEDICINE

## 2023-03-18 PROCEDURE — 80048 BASIC METABOLIC PNL TOTAL CA: CPT

## 2023-03-18 PROCEDURE — 1100000000 HC RM PRIVATE

## 2023-03-18 PROCEDURE — 2580000003 HC RX 258

## 2023-03-18 PROCEDURE — 6370000000 HC RX 637 (ALT 250 FOR IP)

## 2023-03-18 PROCEDURE — 6370000000 HC RX 637 (ALT 250 FOR IP): Performed by: PHYSICIAN ASSISTANT

## 2023-03-18 PROCEDURE — 85027 COMPLETE CBC AUTOMATED: CPT

## 2023-03-18 PROCEDURE — 70551 MRI BRAIN STEM W/O DYE: CPT

## 2023-03-18 PROCEDURE — 83036 HEMOGLOBIN GLYCOSYLATED A1C: CPT

## 2023-03-18 PROCEDURE — 85652 RBC SED RATE AUTOMATED: CPT

## 2023-03-18 PROCEDURE — 99223 1ST HOSP IP/OBS HIGH 75: CPT

## 2023-03-18 PROCEDURE — 94761 N-INVAS EAR/PLS OXIMETRY MLT: CPT

## 2023-03-18 PROCEDURE — 70498 CT ANGIOGRAPHY NECK: CPT

## 2023-03-18 PROCEDURE — 6370000000 HC RX 637 (ALT 250 FOR IP): Performed by: EMERGENCY MEDICINE

## 2023-03-18 PROCEDURE — 84443 ASSAY THYROID STIM HORMONE: CPT

## 2023-03-18 PROCEDURE — 6360000002 HC RX W HCPCS

## 2023-03-18 PROCEDURE — 80061 LIPID PANEL: CPT

## 2023-03-18 RX ORDER — ATORVASTATIN CALCIUM 40 MG/1
80 TABLET, FILM COATED ORAL NIGHTLY
Status: DISCONTINUED | OUTPATIENT
Start: 2023-03-19 | End: 2023-03-22 | Stop reason: HOSPADM

## 2023-03-18 RX ORDER — GABAPENTIN 300 MG/1
300 CAPSULE ORAL 3 TIMES DAILY
Status: DISCONTINUED | OUTPATIENT
Start: 2023-03-18 | End: 2023-03-22 | Stop reason: HOSPADM

## 2023-03-18 RX ORDER — GABAPENTIN 300 MG/1
CAPSULE ORAL
COMMUNITY
Start: 2023-03-14

## 2023-03-18 RX ORDER — ATORVASTATIN CALCIUM 40 MG/1
80 TABLET, FILM COATED ORAL NIGHTLY
Status: DISCONTINUED | OUTPATIENT
Start: 2023-03-18 | End: 2023-03-18

## 2023-03-18 RX ORDER — ASPIRIN 300 MG/1
300 SUPPOSITORY RECTAL DAILY
Status: DISCONTINUED | OUTPATIENT
Start: 2023-03-18 | End: 2023-03-18

## 2023-03-18 RX ORDER — APIXABAN 5 MG/1
TABLET, FILM COATED ORAL
Status: ON HOLD | COMMUNITY
Start: 2023-03-14 | End: 2023-03-22 | Stop reason: HOSPADM

## 2023-03-18 RX ORDER — SODIUM CHLORIDE 9 MG/ML
INJECTION, SOLUTION INTRAVENOUS CONTINUOUS
Status: DISPENSED | OUTPATIENT
Start: 2023-03-18 | End: 2023-03-19

## 2023-03-18 RX ORDER — ONDANSETRON 4 MG/1
4 TABLET, ORALLY DISINTEGRATING ORAL EVERY 8 HOURS PRN
Status: DISCONTINUED | OUTPATIENT
Start: 2023-03-18 | End: 2023-03-22 | Stop reason: HOSPADM

## 2023-03-18 RX ORDER — ATORVASTATIN CALCIUM 40 MG/1
80 TABLET, FILM COATED ORAL
Status: COMPLETED | OUTPATIENT
Start: 2023-03-18 | End: 2023-03-18

## 2023-03-18 RX ORDER — ASPIRIN 300 MG/1
300 SUPPOSITORY RECTAL DAILY
Status: DISCONTINUED | OUTPATIENT
Start: 2023-03-19 | End: 2023-03-20

## 2023-03-18 RX ORDER — CEPHALEXIN 250 MG/1
500 CAPSULE ORAL
Status: COMPLETED | OUTPATIENT
Start: 2023-03-18 | End: 2023-03-18

## 2023-03-18 RX ORDER — CEPHALEXIN 500 MG/1
500 CAPSULE ORAL 4 TIMES DAILY
Qty: 28 CAPSULE | Refills: 0 | Status: SHIPPED
Start: 2023-03-18 | End: 2023-03-22 | Stop reason: HOSPADM

## 2023-03-18 RX ORDER — ASPIRIN 81 MG/1
81 TABLET ORAL DAILY
Status: DISCONTINUED | OUTPATIENT
Start: 2023-03-18 | End: 2023-03-18

## 2023-03-18 RX ORDER — LABETALOL HYDROCHLORIDE 5 MG/ML
5 INJECTION, SOLUTION INTRAVENOUS EVERY 10 MIN PRN
Status: DISCONTINUED | OUTPATIENT
Start: 2023-03-18 | End: 2023-03-22 | Stop reason: HOSPADM

## 2023-03-18 RX ORDER — ASPIRIN 325 MG
325 TABLET ORAL
Status: COMPLETED | OUTPATIENT
Start: 2023-03-18 | End: 2023-03-18

## 2023-03-18 RX ORDER — ASPIRIN 81 MG/1
81 TABLET ORAL DAILY
Status: DISCONTINUED | OUTPATIENT
Start: 2023-03-19 | End: 2023-03-22 | Stop reason: HOSPADM

## 2023-03-18 RX ORDER — ONDANSETRON 2 MG/ML
4 INJECTION INTRAMUSCULAR; INTRAVENOUS EVERY 6 HOURS PRN
Status: DISCONTINUED | OUTPATIENT
Start: 2023-03-18 | End: 2023-03-22 | Stop reason: HOSPADM

## 2023-03-18 RX ORDER — SENNA PLUS 8.6 MG/1
1 TABLET ORAL DAILY PRN
Status: DISCONTINUED | OUTPATIENT
Start: 2023-03-18 | End: 2023-03-22 | Stop reason: HOSPADM

## 2023-03-18 RX ADMIN — ASPIRIN 325 MG: 325 TABLET, FILM COATED ORAL at 13:58

## 2023-03-18 RX ADMIN — GABAPENTIN 300 MG: 300 CAPSULE ORAL at 16:25

## 2023-03-18 RX ADMIN — CEPHALEXIN 500 MG: 250 CAPSULE ORAL at 04:24

## 2023-03-18 RX ADMIN — SODIUM CHLORIDE: 900 INJECTION, SOLUTION INTRAVENOUS at 15:30

## 2023-03-18 RX ADMIN — CEFTRIAXONE 1000 MG: 1 INJECTION, POWDER, FOR SOLUTION INTRAMUSCULAR; INTRAVENOUS at 16:25

## 2023-03-18 RX ADMIN — ATORVASTATIN CALCIUM 80 MG: 40 TABLET, FILM COATED ORAL at 13:58

## 2023-03-18 RX ADMIN — GABAPENTIN 300 MG: 300 CAPSULE ORAL at 20:20

## 2023-03-18 RX ADMIN — IOPAMIDOL 75 ML: 755 INJECTION, SOLUTION INTRAVENOUS at 14:27

## 2023-03-18 ASSESSMENT — ENCOUNTER SYMPTOMS
EYE REDNESS: 0
RHINORRHEA: 0
ABDOMINAL PAIN: 0
SORE THROAT: 0
NAUSEA: 0
BACK PAIN: 0
STRIDOR: 0
VOMITING: 0
SHORTNESS OF BREATH: 0
WHEEZING: 0
COUGH: 0
EYE DISCHARGE: 0

## 2023-03-18 NOTE — ED PROVIDER NOTES
8:18 AM :Pt care assumed from Dr. Martin Snyder , ED provider. Pt complaint(s), current treatment plan, progression and available diagnostic results have been discussed thoroughly. The patient was seen and evaluated on my shift. Rounding occurred: Yes  Intended Disposition: TBD  Pending diagnostic reports and/or labs (please list): MRI recommended, no symptomsw       Adrianna Yates MD  03/18/23 0210    Patient is in the emergency department says that she has some chronic left leg numbness but her speech is much better and said that she had some transient loss of her speech throughout the week. Patient's MRI suggest that she has had multiple small acute strokes and discussed case with Dr. Warner Flair the neurologist on-call and recommends full dose aspirin, high intensity statin, lipid profile, CTA of the head and neck, admission for echocardiogram.  I discussed this with Dr. Sarai Rivera and will admit the patient for further care.      Adrianna Yates MD  03/18/23 8206
Hypertension     Tobacco abuse        Past Surgical History:  History reviewed. No pertinent surgical history. Family History:  Family History   Problem Relation Age of Onset    No Known Problems Sister     Breast Cancer Mother        Social History:  Social History     Tobacco Use    Smoking status: Every Day     Packs/day: 0.25     Types: Cigarettes    Smokeless tobacco: Never   Substance Use Topics    Alcohol use: Yes     Alcohol/week: 14.0 standard drinks    Drug use: Never       Allergies:  No Known Allergies      Review of Systems   Review of Systems   Constitutional:  Negative for chills and fever. HENT:  Negative for congestion, rhinorrhea and sore throat. Eyes:  Negative for discharge and redness. Respiratory:  Negative for cough, shortness of breath, wheezing and stridor. Cardiovascular:  Negative for chest pain. Gastrointestinal:  Negative for abdominal pain, nausea and vomiting. Genitourinary:  Negative for dysuria and frequency. Musculoskeletal:  Negative for back pain and neck pain. Skin:  Negative for rash and wound. Neurological:  Positive for speech difficulty (single episode several days ago, resolved), weakness and numbness. Negative for seizures, syncope and headaches. All other systems reviewed and are negative. All Other Systems Negative  Physical Exam     Vitals:    03/17/23 1736   BP: (!) 171/90   Pulse: (!) 107   Resp: 18   Temp: 97.9 °F (36.6 °C)   TempSrc: Oral   SpO2: 98%   Weight: 211 lb (95.7 kg)     Physical Exam  Vitals and nursing note reviewed. Constitutional:       General: She is not in acute distress. Appearance: Normal appearance. She is not ill-appearing, toxic-appearing or diaphoretic. HENT:      Head: Normocephalic and atraumatic. Mouth/Throat:      Mouth: Mucous membranes are moist.   Eyes:      General: No scleral icterus. Right eye: No discharge. Left eye: No discharge.       Conjunctiva/sclera: Conjunctivae normal.
Cedrick Abdi MD  03/18/23 3435
Statement Selected

## 2023-03-18 NOTE — H&P
history. SocialHx:  Social History     Socioeconomic History    Marital status: Single     Spouse name: None    Number of children: None    Years of education: None    Highest education level: None   Tobacco Use    Smoking status: Every Day     Packs/day: 0.25     Types: Cigarettes    Smokeless tobacco: Never   Substance and Sexual Activity    Alcohol use: Yes     Alcohol/week: 14.0 standard drinks    Drug use: Never       FamilyHx:  Family History   Problem Relation Age of Onset    No Known Problems Sister     Breast Cancer Mother        Home Medications:  Prior to Admission medications    Medication Sig Start Date End Date Taking? Authorizing Provider   cephALEXin (KEFLEX) 500 MG capsule Take 1 capsule by mouth 4 times daily for 7 days 3/18/23 3/25/23 Yes April NATALIO Mora PA-C   Abacavir-Dolutegravir-Lamivud [place]) 600- MG TABS Take 1 tablet by mouth daily 10/31/22   Ar Automatic Reconciliation   acetaminophen (TYLENOL) 500 MG tablet Take 650 mg by mouth every 6 hours as needed 10/31/22   Ar Automatic Reconciliation   amLODIPine (NORVASC) 5 MG tablet Take 5 mg by mouth daily 12/14/22   Ar Automatic Reconciliation   vitamin D (CHOLECALCIFEROL) 28860 UNIT CAPS Take 1 capsule by mouth daily 10/16/22   Ar Automatic Reconciliation   methocarbamol (ROBAXIN) 500 MG tablet Take 500 mg by mouth 4 times daily 10/25/12   Ar Automatic Reconciliation   potassium chloride (KLOR-CON) 10 MEQ extended release tablet Take 10 mEq by mouth daily 10/16/22   Ar Automatic Reconciliation       Allergies:  No Known Allergies     Review of Systems:  CONST: no fever or chills, no fatigue  Eyes: No change in vision, no itching or drainage  ENT: No earache, no tinnitus, no sore throat or sinus congestion. PULM: No shortness of breath, no cough or wheeze.    CV: no pnd or orthopnea, no CP, no palpitations, no edema  GI: No abdominal pain, no nausea, no vomiting or diarrhea  : No urinary frequency, no urgency, no hesitancy or

## 2023-03-18 NOTE — DISCHARGE INSTRUCTIONS
Thank you for requesting your Continuity of Care Document (CCD) electronically. Please follow the instructions below to securely access your online medical record. [employer] allows you to send messages to your doctor, view your test results, renew your prescriptions, schedule appointments, and more. How Do I Access my CCD? In your Internet browser, go to [email or web address] Enter your user name and password   Click on My medical Record  --> Download Summary --> Enter Password --> Download --> Save or Open Document    Additional Information  If you have questions, please contact your physician practice where you receive care. Remember, [employer] is NOT to be used for urgent needs. For medical emergencies, dial 911. Discharge Instructions    Patient: Dorota Rincon MRN: [id number]  CSN: [id number]    YOB: 1965  Age: 62 y.o.   Sex: female    DOA: 3/17/2023       DIET:  cardiac diet    ACTIVITY: activity as tolerated, no driving     Home health care for [facility] for acute CVA    ADDITIONAL INFORMATION: If you experience any of the following symptoms but not limited to Fever, chills, nausea, vomiting, diarrhea, change in mentation, falling, bleeding, shortness of breath, chest pain, please call your primary care physician or return to the emergency room if you cannot get hold of your doctor:     FOLLOW UP CARE:  Dr. Claudia Hansen on 3- at 9:45  Dr. Lorri Burkitt Sx 4-3-2023 at 2:00  Dr. Sabi Chapman, neurology N8- at 1:30      Terry Hdez MD  3/22/2023 11:27 AM

## 2023-03-19 ENCOUNTER — APPOINTMENT (OUTPATIENT)
Facility: HOSPITAL | Age: 58
DRG: 024 | End: 2023-03-19
Payer: MEDICAID

## 2023-03-19 LAB
ANION GAP SERPL CALC-SCNC: 1 MMOL/L (ref 3–18)
APTT PPP: 24 SEC (ref 23–36.4)
APTT PPP: 37.7 SEC (ref 23–36.4)
BUN SERPL-MCNC: 14 MG/DL (ref 7–18)
BUN/CREAT SERPL: 20 (ref 12–20)
CALCIUM SERPL-MCNC: 9 MG/DL (ref 8.5–10.1)
CHLORIDE SERPL-SCNC: 115 MMOL/L (ref 100–111)
CO2 SERPL-SCNC: 24 MMOL/L (ref 21–32)
CREAT SERPL-MCNC: 0.71 MG/DL (ref 0.6–1.3)
ERYTHROCYTE [DISTWIDTH] IN BLOOD BY AUTOMATED COUNT: 14 % (ref 11.6–14.5)
ERYTHROCYTE [DISTWIDTH] IN BLOOD BY AUTOMATED COUNT: 14.2 % (ref 11.6–14.5)
GLUCOSE SERPL-MCNC: 108 MG/DL (ref 74–99)
HCT VFR BLD AUTO: 39.9 % (ref 35–45)
HCT VFR BLD AUTO: 41.8 % (ref 35–45)
HGB BLD-MCNC: 12.1 G/DL (ref 12–16)
HGB BLD-MCNC: 12.5 G/DL (ref 12–16)
MCH RBC QN AUTO: 27.5 PG (ref 24–34)
MCH RBC QN AUTO: 27.9 PG (ref 24–34)
MCHC RBC AUTO-ENTMCNC: 29.9 G/DL (ref 31–37)
MCHC RBC AUTO-ENTMCNC: 30.3 G/DL (ref 31–37)
MCV RBC AUTO: 90.7 FL (ref 78–100)
MCV RBC AUTO: 93.3 FL (ref 78–100)
NRBC # BLD: 0 K/UL (ref 0–0.01)
NRBC # BLD: 0.02 K/UL (ref 0–0.01)
NRBC BLD-RTO: 0 PER 100 WBC
NRBC BLD-RTO: 0.4 PER 100 WBC
PLATELET # BLD AUTO: 205 K/UL (ref 135–420)
PLATELET # BLD AUTO: 246 K/UL (ref 135–420)
PMV BLD AUTO: 9.5 FL (ref 9.2–11.8)
PMV BLD AUTO: 9.6 FL (ref 9.2–11.8)
POTASSIUM SERPL-SCNC: 4.6 MMOL/L (ref 3.5–5.5)
RBC # BLD AUTO: 4.4 M/UL (ref 4.2–5.3)
RBC # BLD AUTO: 4.48 M/UL (ref 4.2–5.3)
SODIUM SERPL-SCNC: 140 MMOL/L (ref 136–145)
VAS LEFT BULB EDV: 52.5 CM/S
VAS LEFT BULB PSV: 147.8 CM/S
VAS LEFT CCA DIST EDV: 22 CM/S
VAS LEFT CCA DIST PSV: 60.7 CM/S
VAS LEFT CCA MID EDV: 20.35 CM/S
VAS LEFT CCA MID PSV: 60.74 CM/S
VAS LEFT CCA PROX EDV: 26.8 CM/S
VAS LEFT CCA PROX PSV: 89.8 CM/S
VAS LEFT ECA EDV: 17.26 CM/S
VAS LEFT ECA PSV: 100.9 CM/S
VAS LEFT ICA DIST EDV: 26.8 CM/S
VAS LEFT ICA DIST PSV: 91.8 CM/S
VAS LEFT ICA MID EDV: 24.8 CM/S
VAS LEFT ICA MID PSV: 99.7 CM/S
VAS LEFT ICA PROX EDV: 34.9 CM/S
VAS LEFT ICA PROX PSV: 124.9 CM/S
VAS LEFT ICA/CCA PSV: 2.43 NO UNITS
VAS LEFT SUBCLAVIAN PROX EDV: 0 CM/S
VAS LEFT SUBCLAVIAN PROX PSV: 59 CM/S
VAS LEFT VERTEBRAL EDV: 17.15 CM/S
VAS LEFT VERTEBRAL PSV: 47.9 CM/S
VAS RIGHT BULB EDV: 27.8 CM/S
VAS RIGHT BULB PSV: 150.7 CM/S
VAS RIGHT CCA DIST EDV: 18.3 CM/S
VAS RIGHT CCA DIST PSV: 49 CM/S
VAS RIGHT CCA MID EDV: 14.95 CM/S
VAS RIGHT CCA MID PSV: 63.31 CM/S
VAS RIGHT CCA PROX EDV: 17.6 CM/S
VAS RIGHT CCA PROX PSV: 59.6 CM/S
VAS RIGHT ECA EDV: 29.97 CM/S
VAS RIGHT ECA PSV: 139.2 CM/S
VAS RIGHT ICA DIST EDV: 26.8 CM/S
VAS RIGHT ICA DIST PSV: 104.8 CM/S
VAS RIGHT ICA MID EDV: 42 CM/S
VAS RIGHT ICA MID PSV: 205.6 CM/S
VAS RIGHT ICA PROX EDV: 226.1 CM/S
VAS RIGHT ICA PROX PSV: 519 CM/S
VAS RIGHT ICA/CCA PSV: 10.6 NO UNITS
VAS RIGHT SUBCLAVIAN PROX EDV: 0 CM/S
VAS RIGHT SUBCLAVIAN PROX PSV: 90.5 CM/S
VAS RIGHT VERTEBRAL EDV: 28.92 CM/S
VAS RIGHT VERTEBRAL PSV: 69.3 CM/S
WBC # BLD AUTO: 4.5 K/UL (ref 4.6–13.2)
WBC # BLD AUTO: 5 K/UL (ref 4.6–13.2)

## 2023-03-19 PROCEDURE — 2500000003 HC RX 250 WO HCPCS: Performed by: HOSPITALIST

## 2023-03-19 PROCEDURE — 36415 COLL VENOUS BLD VENIPUNCTURE: CPT

## 2023-03-19 PROCEDURE — 97162 PT EVAL MOD COMPLEX 30 MIN: CPT

## 2023-03-19 PROCEDURE — 6360000002 HC RX W HCPCS

## 2023-03-19 PROCEDURE — 99233 SBSQ HOSP IP/OBS HIGH 50: CPT | Performed by: HOSPITALIST

## 2023-03-19 PROCEDURE — 6370000000 HC RX 637 (ALT 250 FOR IP)

## 2023-03-19 PROCEDURE — 85027 COMPLETE CBC AUTOMATED: CPT

## 2023-03-19 PROCEDURE — 93880 EXTRACRANIAL BILAT STUDY: CPT | Performed by: STUDENT IN AN ORGANIZED HEALTH CARE EDUCATION/TRAINING PROGRAM

## 2023-03-19 PROCEDURE — 1100000000 HC RM PRIVATE

## 2023-03-19 PROCEDURE — 93880 EXTRACRANIAL BILAT STUDY: CPT

## 2023-03-19 PROCEDURE — 80048 BASIC METABOLIC PNL TOTAL CA: CPT

## 2023-03-19 PROCEDURE — 93970 EXTREMITY STUDY: CPT

## 2023-03-19 PROCEDURE — 2580000003 HC RX 258

## 2023-03-19 PROCEDURE — 85730 THROMBOPLASTIN TIME PARTIAL: CPT

## 2023-03-19 PROCEDURE — 93970 EXTREMITY STUDY: CPT | Performed by: STUDENT IN AN ORGANIZED HEALTH CARE EDUCATION/TRAINING PROGRAM

## 2023-03-19 RX ORDER — HEPARIN SODIUM 10000 [USP'U]/100ML
5-30 INJECTION, SOLUTION INTRAVENOUS CONTINUOUS
Status: CANCELLED | OUTPATIENT
Start: 2023-03-19

## 2023-03-19 RX ORDER — HEPARIN SODIUM 10000 [USP'U]/100ML
5-30 INJECTION, SOLUTION INTRAVENOUS CONTINUOUS
Status: DISCONTINUED | OUTPATIENT
Start: 2023-03-19 | End: 2023-03-21

## 2023-03-19 RX ORDER — HEPARIN SODIUM 1000 [USP'U]/ML
80 INJECTION, SOLUTION INTRAVENOUS; SUBCUTANEOUS PRN
Status: CANCELLED | OUTPATIENT
Start: 2023-03-19

## 2023-03-19 RX ORDER — HEPARIN SODIUM 1000 [USP'U]/ML
40 INJECTION, SOLUTION INTRAVENOUS; SUBCUTANEOUS PRN
Status: CANCELLED | OUTPATIENT
Start: 2023-03-19

## 2023-03-19 RX ORDER — HEPARIN SODIUM 1000 [USP'U]/ML
80 INJECTION, SOLUTION INTRAVENOUS; SUBCUTANEOUS ONCE
Status: CANCELLED | OUTPATIENT
Start: 2023-03-19 | End: 2023-03-19

## 2023-03-19 RX ADMIN — ATORVASTATIN CALCIUM 80 MG: 40 TABLET, FILM COATED ORAL at 21:34

## 2023-03-19 RX ADMIN — ABACAVIR SULFATE, DOLUTEGRAVIR SODIUM, LAMIVUDINE 600 MG: 600; 50; 300 TABLET, FILM COATED ORAL at 17:28

## 2023-03-19 RX ADMIN — GABAPENTIN 300 MG: 300 CAPSULE ORAL at 14:59

## 2023-03-19 RX ADMIN — HEPARIN SODIUM 12 UNITS/KG/HR: 10000 INJECTION, SOLUTION INTRAVENOUS at 15:26

## 2023-03-19 RX ADMIN — GABAPENTIN 300 MG: 300 CAPSULE ORAL at 21:34

## 2023-03-19 RX ADMIN — CEFTRIAXONE 1000 MG: 1 INJECTION, POWDER, FOR SOLUTION INTRAMUSCULAR; INTRAVENOUS at 15:02

## 2023-03-19 RX ADMIN — GABAPENTIN 300 MG: 300 CAPSULE ORAL at 08:45

## 2023-03-19 RX ADMIN — ASPIRIN 81 MG: 81 TABLET, COATED ORAL at 08:45

## 2023-03-19 ASSESSMENT — PAIN SCALES - GENERAL
PAINLEVEL_OUTOF10: 0

## 2023-03-19 NOTE — ED NOTES
Assumed care of patient. Patient resting in room with no complaints, will continue to monitor.      Mckinley Sams RN  03/19/23 7579
Assumed care of pt, received report from [id number] [facility] chest rise and fall  Pt resting in room with lights dimmed, eyes closed, door chika LARIOS noted         Lisseth Haynes, RN  03/19/23 7873
Patient given a box of tissues per request.     Vishal Casiano RN  03/19/23 0559
Patient to [address] [place] [address]  03/18/23 4685
Patient to [address] [place] [address]  03/18/23 6690
Pt ambulated restroom w/ a steady gait.      Aide Neely RN  03/19/23 8396
Pt given cup and asked to obtain urine sample. Awaiting CT.      Mariela Granger RN  03/17/23 9692
Pt mediated per [facility]  Tolerated well  Pt provided meal tray  Speaking in clear and complete sentences.   NAD noted      Pt to vascular at this time     Neema Martin RN  03/19/23 1297
Pt received from . MRI screening form completed.      Cici Davis RN  03/18/23 4009
Pt resting in recliner awaiting CT, NAD, call baum in reach.      Adriel Tarango RN  03/17/23 4872
Pt yelling out from room, this RN responded  Pt asking to have light turned on and to go home  Pt polite and cooperative  NAD noted     Bethel Brandon, ROSIE  03/19/23 1250
Report called to [facility], RN  03/19/23 7476
3

## 2023-03-20 ENCOUNTER — APPOINTMENT (OUTPATIENT)
Facility: HOSPITAL | Age: 58
DRG: 024 | End: 2023-03-20
Payer: MEDICAID

## 2023-03-20 ENCOUNTER — ANESTHESIA EVENT (OUTPATIENT)
Dept: CARDIOTHORACIC SURGERY | Facility: HOSPITAL | Age: 58
DRG: 024 | End: 2023-03-20
Payer: MEDICAID

## 2023-03-20 PROBLEM — I65.21 STENOSIS OF RIGHT CAROTID ARTERY: Status: ACTIVE | Noted: 2023-03-20

## 2023-03-20 LAB
ALBUMIN SERPL-MCNC: 3.2 G/DL (ref 3.4–5)
ALBUMIN/GLOB SERPL: 0.7 (ref 0.8–1.7)
ALP SERPL-CCNC: 106 U/L (ref 45–117)
ALT SERPL-CCNC: 23 U/L (ref 13–56)
ANION GAP SERPL CALC-SCNC: 2 MMOL/L (ref 3–18)
ANION GAP SERPL CALC-SCNC: 5 MMOL/L (ref 3–18)
APTT PPP: 61.8 SEC (ref 23–36.4)
APTT PPP: 63.3 SEC (ref 23–36.4)
APTT PPP: 70.8 SEC (ref 23–36.4)
AST SERPL-CCNC: 15 U/L (ref 10–38)
BACTERIA SPEC CULT: ABNORMAL
BASOPHILS # BLD: 0 K/UL (ref 0–0.1)
BASOPHILS NFR BLD: 1 % (ref 0–2)
BILIRUB SERPL-MCNC: 0.3 MG/DL (ref 0.2–1)
BUN SERPL-MCNC: 7 MG/DL (ref 7–18)
BUN SERPL-MCNC: 9 MG/DL (ref 7–18)
BUN/CREAT SERPL: 11 (ref 12–20)
BUN/CREAT SERPL: 13 (ref 12–20)
CALCIUM SERPL-MCNC: 8.7 MG/DL (ref 8.5–10.1)
CALCIUM SERPL-MCNC: 9.2 MG/DL (ref 8.5–10.1)
CC UR VC: ABNORMAL
CHLORIDE SERPL-SCNC: 110 MMOL/L (ref 100–111)
CHLORIDE SERPL-SCNC: 112 MMOL/L (ref 100–111)
CO2 SERPL-SCNC: 23 MMOL/L (ref 21–32)
CO2 SERPL-SCNC: 29 MMOL/L (ref 21–32)
CREAT SERPL-MCNC: 0.66 MG/DL (ref 0.6–1.3)
CREAT SERPL-MCNC: 0.7 MG/DL (ref 0.6–1.3)
DIFFERENTIAL METHOD BLD: ABNORMAL
ECHO AV AREA PEAK VELOCITY: 2 CM2
ECHO AV AREA VTI: 2.1 CM2
ECHO AV AREA/BSA PEAK VELOCITY: 1 CM2/M2
ECHO AV AREA/BSA VTI: 1 CM2/M2
ECHO AV MEAN GRADIENT: 11 MMHG
ECHO AV MEAN VELOCITY: 1.5 M/S
ECHO AV PEAK GRADIENT: 20 MMHG
ECHO AV PEAK VELOCITY: 2.3 M/S
ECHO AV VELOCITY RATIO: 0.74
ECHO AV VTI: 42.4 CM
ECHO BSA: 2.13 M2
ECHO LA VOL 2C: 43 ML (ref 22–52)
ECHO LA VOL 4C: 18 ML (ref 22–52)
ECHO LA VOL BP: 29 ML (ref 22–52)
ECHO LA VOL/BSA BIPLANE: 14 ML/M2 (ref 16–34)
ECHO LA VOLUME AREA LENGTH: 31 ML
ECHO LA VOLUME INDEX A2C: 21 ML/M2 (ref 16–34)
ECHO LA VOLUME INDEX A4C: 9 ML/M2 (ref 16–34)
ECHO LA VOLUME INDEX AREA LENGTH: 15 ML/M2 (ref 16–34)
ECHO LV E' LATERAL VELOCITY: 6 CM/S
ECHO LV E' SEPTAL VELOCITY: 5 CM/S
ECHO LV FRACTIONAL SHORTENING: 56 % (ref 28–44)
ECHO LV INTERNAL DIMENSION DIASTOLE INDEX: 2.08 CM/M2
ECHO LV INTERNAL DIMENSION DIASTOLIC: 4.3 CM (ref 3.9–5.3)
ECHO LV INTERNAL DIMENSION SYSTOLIC INDEX: 0.92 CM/M2
ECHO LV INTERNAL DIMENSION SYSTOLIC: 1.9 CM
ECHO LV IVSD: 1.8 CM (ref 0.6–0.9)
ECHO LV MASS 2D: 271.6 G (ref 67–162)
ECHO LV MASS INDEX 2D: 131.2 G/M2 (ref 43–95)
ECHO LV POSTERIOR WALL DIASTOLIC: 1.3 CM (ref 0.6–0.9)
ECHO LV RELATIVE WALL THICKNESS RATIO: 0.6
ECHO LVOT AREA: 2.8 CM2
ECHO LVOT AV VTI INDEX: 0.78
ECHO LVOT DIAM: 1.9 CM
ECHO LVOT MEAN GRADIENT: 5 MMHG
ECHO LVOT PEAK GRADIENT: 11 MMHG
ECHO LVOT PEAK VELOCITY: 1.7 M/S
ECHO LVOT STROKE VOLUME INDEX: 45.3 ML/M2
ECHO LVOT SV: 93.8 ML
ECHO LVOT VTI: 33.1 CM
ECHO MV A VELOCITY: 0.96 M/S
ECHO MV E DECELERATION TIME (DT): 241.8 MS
ECHO MV E VELOCITY: 0.88 M/S
ECHO MV E/A RATIO: 0.92
ECHO MV E/E' LATERAL: 14.67
ECHO MV E/E' RATIO (AVERAGED): 16.13
ECHO MV E/E' SEPTAL: 17.6
ECHO PV MAX VELOCITY: 0.8 M/S
ECHO PV PEAK GRADIENT: 3 MMHG
ECHO RV FREE WALL PEAK S': 15 CM/S
ECHO RV LONGITUDINAL DIMENSION: 3 CM
ECHO RV TAPSE: 2.1 CM (ref 1.7–?)
EOSINOPHIL # BLD: 0.1 K/UL (ref 0–0.4)
EOSINOPHIL NFR BLD: 2 % (ref 0–5)
ERYTHROCYTE [DISTWIDTH] IN BLOOD BY AUTOMATED COUNT: 13.8 % (ref 11.6–14.5)
GLOBULIN SER CALC-MCNC: 4.3 G/DL (ref 2–4)
GLUCOSE SERPL-MCNC: 132 MG/DL (ref 74–99)
GLUCOSE SERPL-MCNC: 93 MG/DL (ref 74–99)
HCT VFR BLD AUTO: 36.3 % (ref 35–45)
HGB BLD-MCNC: 11.3 G/DL (ref 12–16)
IMM GRANULOCYTES # BLD AUTO: 0 K/UL (ref 0–0.04)
IMM GRANULOCYTES NFR BLD AUTO: 1 % (ref 0–0.5)
INR PPP: 1 (ref 0.8–1.2)
LV EF: 58 %
LVEF MODALITY: ABNORMAL
LYMPHOCYTES # BLD: 1.8 K/UL (ref 0.9–3.6)
LYMPHOCYTES NFR BLD: 43 % (ref 21–52)
MAGNESIUM SERPL-MCNC: 2.1 MG/DL (ref 1.6–2.6)
MCH RBC QN AUTO: 27.8 PG (ref 24–34)
MCHC RBC AUTO-ENTMCNC: 31.1 G/DL (ref 31–37)
MCV RBC AUTO: 89.2 FL (ref 78–100)
MONOCYTES # BLD: 0.3 K/UL (ref 0.05–1.2)
MONOCYTES NFR BLD: 6 % (ref 3–10)
NEUTS SEG # BLD: 2 K/UL (ref 1.8–8)
NEUTS SEG NFR BLD: 47 % (ref 40–73)
NRBC # BLD: 0 K/UL (ref 0–0.01)
NRBC BLD-RTO: 0 PER 100 WBC
PLATELET # BLD AUTO: 231 K/UL (ref 135–420)
PMV BLD AUTO: 9.7 FL (ref 9.2–11.8)
POTASSIUM SERPL-SCNC: 3.7 MMOL/L (ref 3.5–5.5)
POTASSIUM SERPL-SCNC: 4.1 MMOL/L (ref 3.5–5.5)
PROT SERPL-MCNC: 7.5 G/DL (ref 6.4–8.2)
PROTHROMBIN TIME: 13.2 SEC (ref 11.5–15.2)
RBC # BLD AUTO: 4.07 M/UL (ref 4.2–5.3)
SERVICE CMNT-IMP: ABNORMAL
SODIUM SERPL-SCNC: 140 MMOL/L (ref 136–145)
SODIUM SERPL-SCNC: 141 MMOL/L (ref 136–145)
WBC # BLD AUTO: 4.3 K/UL (ref 4.6–13.2)

## 2023-03-20 PROCEDURE — 85025 COMPLETE CBC W/AUTO DIFF WBC: CPT

## 2023-03-20 PROCEDURE — 94761 N-INVAS EAR/PLS OXIMETRY MLT: CPT

## 2023-03-20 PROCEDURE — 93306 TTE W/DOPPLER COMPLETE: CPT

## 2023-03-20 PROCEDURE — 74177 CT ABD & PELVIS W/CONTRAST: CPT

## 2023-03-20 PROCEDURE — 99232 SBSQ HOSP IP/OBS MODERATE 35: CPT | Performed by: HOSPITALIST

## 2023-03-20 PROCEDURE — 93306 TTE W/DOPPLER COMPLETE: CPT | Performed by: INTERNAL MEDICINE

## 2023-03-20 PROCEDURE — 2500000003 HC RX 250 WO HCPCS: Performed by: HOSPITALIST

## 2023-03-20 PROCEDURE — 6360000004 HC RX CONTRAST MEDICATION: Performed by: INTERNAL MEDICINE

## 2023-03-20 PROCEDURE — 80053 COMPREHEN METABOLIC PANEL: CPT

## 2023-03-20 PROCEDURE — 1100000000 HC RM PRIVATE

## 2023-03-20 PROCEDURE — 85610 PROTHROMBIN TIME: CPT

## 2023-03-20 PROCEDURE — 85730 THROMBOPLASTIN TIME PARTIAL: CPT

## 2023-03-20 PROCEDURE — 83735 ASSAY OF MAGNESIUM: CPT

## 2023-03-20 PROCEDURE — 6370000000 HC RX 637 (ALT 250 FOR IP): Performed by: HOSPITALIST

## 2023-03-20 PROCEDURE — 6360000002 HC RX W HCPCS: Performed by: INTERNAL MEDICINE

## 2023-03-20 PROCEDURE — 6370000000 HC RX 637 (ALT 250 FOR IP)

## 2023-03-20 PROCEDURE — 36415 COLL VENOUS BLD VENIPUNCTURE: CPT

## 2023-03-20 PROCEDURE — 92610 EVALUATE SWALLOWING FUNCTION: CPT

## 2023-03-20 RX ORDER — SODIUM CHLORIDE 0.9 % (FLUSH) 0.9 %
5-40 SYRINGE (ML) INJECTION PRN
Status: CANCELLED | OUTPATIENT
Start: 2023-03-20

## 2023-03-20 RX ORDER — LIDOCAINE HYDROCHLORIDE 10 MG/ML
1 INJECTION, SOLUTION EPIDURAL; INFILTRATION; INTRACAUDAL; PERINEURAL
Status: CANCELLED | OUTPATIENT
Start: 2023-03-20 | End: 2023-03-21

## 2023-03-20 RX ORDER — CIPROFLOXACIN 2 MG/ML
400 INJECTION, SOLUTION INTRAVENOUS EVERY 12 HOURS
Status: DISCONTINUED | OUTPATIENT
Start: 2023-03-20 | End: 2023-03-22

## 2023-03-20 RX ORDER — SODIUM CHLORIDE, SODIUM LACTATE, POTASSIUM CHLORIDE, CALCIUM CHLORIDE 600; 310; 30; 20 MG/100ML; MG/100ML; MG/100ML; MG/100ML
INJECTION, SOLUTION INTRAVENOUS CONTINUOUS
Status: CANCELLED | OUTPATIENT
Start: 2023-03-20

## 2023-03-20 RX ORDER — AMLODIPINE BESYLATE 5 MG/1
5 TABLET ORAL DAILY
Status: DISCONTINUED | OUTPATIENT
Start: 2023-03-20 | End: 2023-03-22 | Stop reason: HOSPADM

## 2023-03-20 RX ORDER — BACTERIOSTATIC SODIUM CHLORIDE 0.9 %
10 VIAL (ML) INJECTION ONCE
Status: COMPLETED | OUTPATIENT
Start: 2023-03-20 | End: 2023-03-20

## 2023-03-20 RX ORDER — FAMOTIDINE 20 MG/1
20 TABLET, FILM COATED ORAL ONCE
Status: CANCELLED | OUTPATIENT
Start: 2023-03-20 | End: 2023-03-20

## 2023-03-20 RX ADMIN — AMLODIPINE BESYLATE 5 MG: 5 TABLET ORAL at 18:58

## 2023-03-20 RX ADMIN — ABACAVIR SULFATE, DOLUTEGRAVIR SODIUM, LAMIVUDINE 600 MG: 600; 50; 300 TABLET, FILM COATED ORAL at 16:46

## 2023-03-20 RX ADMIN — CIPROFLOXACIN 400 MG: 2 INJECTION, SOLUTION INTRAVENOUS at 16:45

## 2023-03-20 RX ADMIN — ATORVASTATIN CALCIUM 80 MG: 40 TABLET, FILM COATED ORAL at 20:25

## 2023-03-20 RX ADMIN — SODIUM CHLORIDE 10 ML: 9 INJECTION INTRAMUSCULAR; INTRAVENOUS; SUBCUTANEOUS at 11:15

## 2023-03-20 RX ADMIN — HEPARIN SODIUM 13 UNITS/KG/HR: 10000 INJECTION, SOLUTION INTRAVENOUS at 13:17

## 2023-03-20 RX ADMIN — GABAPENTIN 300 MG: 300 CAPSULE ORAL at 20:25

## 2023-03-20 RX ADMIN — IOPAMIDOL 70 ML: 612 INJECTION, SOLUTION INTRAVENOUS at 13:38

## 2023-03-20 RX ADMIN — GABAPENTIN 300 MG: 300 CAPSULE ORAL at 13:15

## 2023-03-20 RX ADMIN — GABAPENTIN 300 MG: 300 CAPSULE ORAL at 09:22

## 2023-03-20 RX ADMIN — ASPIRIN 81 MG: 81 TABLET, COATED ORAL at 09:22

## 2023-03-20 ASSESSMENT — PAIN SCALES - GENERAL
PAINLEVEL_OUTOF10: 0
PAINLEVEL_OUTOF10: 0

## 2023-03-20 NOTE — CONSULTS
Re CTA 3/18  No LVO  Right carotid 75% plus stenosis
Vascular Surgery      Patient: Irish Grullon MRN: [id number]  [facility]: [id number]      YOB: 1965    Age: 62 y.o. Sex: female      DOA: 3/17/2023       HPI:     Irish Grullon is a 62 y.o. female who presents with symptoms of slurred speech and ataxia with left arm weakness prior to presentation to the ED which resolved. Patient states that prior to her arrival a few days ago she couldn't speak properly and her left arm was weak. Patient has a history noted of HIV on retroviral therapy, DVT on Eliquis *patient states she did not know she was supposed to be on eliquis and has not been taking it. Patient has a history of PAD and underwent a left leg angiogram and iliac angioplasty for left leg ischemia in December 2022. Patient has not been seen for follow up since. This admission, stroke work up was initiated which included:  CT Head: Small right parietal lobe infarct, age indeterminate  MRI Brain: Small foci of acute ischemia in right parietal lobe, small right occipital lobe infarct   CTA Neck: images reviewed, critical stenosis at the origin of the right ICA near occlusive plaque at the origin, moderate left carotid stenosis  Carotid duplex: critical right ICA stenosis, moderate left carotid stenosis    Patient currently asymptomatic and back to baseline. Currently on ASA.        Assessment/Plan     58yo F with acute right parietal lobe stroke with critical right carotid stenosis    - Plan for right carotid endarterectomy with EEG monitoring with possible intraoperative shunting on Tuesday, 3/21/23  - Patient denies taking eliquis, no DVT identified on duplex  - Recommend initiating heparin anticoagulation due to stroke and near occlusive proximal right carotid stenosis in a setting of stroke  - Understand the risk of low dose asa 81mg and heparin gtt together however patient presenting with stroke with critical right carotid stenosis  - Patient understands the need for the procedure
left-sided drooling. She subsequently had recurrent left lower extremity numbness. Came to emergency room on 3/17 for further evaluation. In the ED,  Temp 97.9, Resp 16-18,  - 77, /90 - 138/71, % on RA. CBC/CMP without significant abnormality. UA with evidence of pyuria. Urine culture obtained. CT head with small right parietal lobe cortical infarcts. MRI brain with multiple small foci of acute ischemia within the right parietal lobe, small focus of acute ischemia in the right occipital lobe, few small remote right lacunar infarcts. Initiated on ceftriaxone. I was consulted for further recommendations. Patient denies any fever, chills. Upon further questioning, she states that she has had increased frequency of urination for about 3 to 4 days prior to admission. Denies any burning while urinating. She also has left flank pain for the past 2 weeks. Review of records reveals that patient complained of left flank pain in December 2022. Past Medical History:   Diagnosis Date    Hypertension     Tobacco abuse        History reviewed. No pertinent surgical history.     @[facility]@    Current Facility-Administered Medications   Medication Dose Route Frequency    heparin 25,000 unit in sodium chloride 0.45% 250 mL (premix) infusion  5-30 Units/kg/hr IntraVENous Continuous    ondansetron (ZOFRAN-ODT) disintegrating tablet 4 mg  4 mg Oral Q8H PRN    Or    ondansetron (ZOFRAN) injection 4 mg  4 mg IntraVENous Q6H PRN    senna (SENOKOT) tablet 8.6 mg  1 tablet Oral Daily PRN    labetalol (NORMODYNE;TRANDATE) injection 5 mg  5 mg IntraVENous Q10 Min PRN    aspirin EC tablet 81 mg  81 mg Oral Daily    Or    aspirin suppository 300 mg  300 mg Rectal Daily    atorvastatin (LIPITOR) tablet 80 mg  80 mg Oral Nightly    cefTRIAXone (ROCEPHIN) 1,000 mg in sterile water 10 mL IV syringe  1,000 mg IntraVENous Q24H    gabapentin (NEURONTIN) capsule 300 mg  300 mg Oral TID
5/4+ (? Pain limited due to IV)  Wrist ext          5/5  Finger ext        5/5  Hand intrin       5/4+    Hip flex            5/5  Knee ext          5/5  Ankle dorsi       5/5      DTRs           (right/left)  Biceps                 2/2  Brachorad           2/2  Patellar                0/0  Ankles                 0/0    Coordination  Finger nose finger intact bilaterally. Heel to shin is normal bilaterally. Sensation:   Symmetric intact to LT in upper and lower extremities. Gait: deferred. LABS/Studies  A1c 5.8  TSH 1.12  .6  ESR 43    MRI brain w/o contrast (3/18/2023): Impression       Multiple small foci of acute ischemia within the right parietal lobe with most   dominant noted on recent CT. Additional small focus of acute ischemia in the   right occipital lobe. A few small remote right white matter lacunar infarcts. Nonspecific white matter disease, right greater than left. This may reflect   chronic microangiopathy. Given distribution of acute and chronic above findings, CTA and/or MRA of the   head/neck could be considered for full evaluation. Mild sinus disease. Please see report for additional details. CTA head/neck (3/18/2023): Impression   Category 3 high-grade stenosis right carotid artery initial wet reading stated   approximately 70% however with reconstruction images concern for 85-90% stenosis   Category 2 changes left carotid artery       Vertebral and subclavian is extracranially intact       Intracranial structures unremarkable for obstruction       See comments regarding need for follow-up exam of the right upper lobe mass   possibly malignant       Refer to recent CTA chest recommendations     CT head w/o contrast (3/18/2023): Impression       Small right parietal lobe cortical infarcts, age-indeterminate without priors. Mild chronic microvascular ischemic changes.                IMPRESSION:     Stroke  62year old woman with a

## 2023-03-21 ENCOUNTER — ANESTHESIA (OUTPATIENT)
Dept: CARDIOTHORACIC SURGERY | Facility: HOSPITAL | Age: 58
DRG: 024 | End: 2023-03-21
Payer: MEDICAID

## 2023-03-21 LAB
ACT BLD: 161 SECS (ref 79–138)
ACT BLD: 197 SECS (ref 79–138)
ACT BLD: 239 SECS (ref 79–138)
ACT BLD: 251 SECS (ref 79–138)
ACT BLD: 281 SECS (ref 79–138)
ACT BLD: 299 SECS (ref 79–138)
APTT PPP: 24.5 SEC (ref 23–36.4)
ERYTHROCYTE [DISTWIDTH] IN BLOOD BY AUTOMATED COUNT: 13.8 % (ref 11.6–14.5)
HCT VFR BLD AUTO: 34.2 % (ref 35–45)
HGB BLD-MCNC: 10.9 G/DL (ref 12–16)
MCH RBC QN AUTO: 28 PG (ref 24–34)
MCHC RBC AUTO-ENTMCNC: 31.9 G/DL (ref 31–37)
MCV RBC AUTO: 87.9 FL (ref 78–100)
NRBC # BLD: 0 K/UL (ref 0–0.01)
NRBC BLD-RTO: 0 PER 100 WBC
PLATELET # BLD AUTO: 244 K/UL (ref 135–420)
PMV BLD AUTO: 9.5 FL (ref 9.2–11.8)
RBC # BLD AUTO: 3.89 M/UL (ref 4.2–5.3)
WBC # BLD AUTO: 4.6 K/UL (ref 4.6–13.2)

## 2023-03-21 PROCEDURE — 6370000000 HC RX 637 (ALT 250 FOR IP)

## 2023-03-21 PROCEDURE — 3600000013 HC SURGERY LEVEL 3 ADDTL 15MIN: Performed by: STUDENT IN AN ORGANIZED HEALTH CARE EDUCATION/TRAINING PROGRAM

## 2023-03-21 PROCEDURE — 2709999900 HC NON-CHARGEABLE SUPPLY: Performed by: STUDENT IN AN ORGANIZED HEALTH CARE EDUCATION/TRAINING PROGRAM

## 2023-03-21 PROCEDURE — 6360000002 HC RX W HCPCS: Performed by: NURSE ANESTHETIST, CERTIFIED REGISTERED

## 2023-03-21 PROCEDURE — 85347 COAGULATION TIME ACTIVATED: CPT

## 2023-03-21 PROCEDURE — 3600000003 HC SURGERY LEVEL 3 BASE: Performed by: STUDENT IN AN ORGANIZED HEALTH CARE EDUCATION/TRAINING PROGRAM

## 2023-03-21 PROCEDURE — 85730 THROMBOPLASTIN TIME PARTIAL: CPT

## 2023-03-21 PROCEDURE — 2580000003 HC RX 258: Performed by: NURSE ANESTHETIST, CERTIFIED REGISTERED

## 2023-03-21 PROCEDURE — 03CH0ZZ EXTIRPATION OF MATTER FROM RIGHT COMMON CAROTID ARTERY, OPEN APPROACH: ICD-10-PCS | Performed by: STUDENT IN AN ORGANIZED HEALTH CARE EDUCATION/TRAINING PROGRAM

## 2023-03-21 PROCEDURE — 2500000003 HC RX 250 WO HCPCS: Performed by: ANESTHESIOLOGY

## 2023-03-21 PROCEDURE — 6370000000 HC RX 637 (ALT 250 FOR IP): Performed by: HOSPITALIST

## 2023-03-21 PROCEDURE — 6360000002 HC RX W HCPCS: Performed by: STUDENT IN AN ORGANIZED HEALTH CARE EDUCATION/TRAINING PROGRAM

## 2023-03-21 PROCEDURE — 3700000000 HC ANESTHESIA ATTENDED CARE: Performed by: STUDENT IN AN ORGANIZED HEALTH CARE EDUCATION/TRAINING PROGRAM

## 2023-03-21 PROCEDURE — 6360000002 HC RX W HCPCS: Performed by: INTERNAL MEDICINE

## 2023-03-21 PROCEDURE — C1768 GRAFT, VASCULAR: HCPCS | Performed by: STUDENT IN AN ORGANIZED HEALTH CARE EDUCATION/TRAINING PROGRAM

## 2023-03-21 PROCEDURE — 3700000001 HC ADD 15 MINUTES (ANESTHESIA): Performed by: STUDENT IN AN ORGANIZED HEALTH CARE EDUCATION/TRAINING PROGRAM

## 2023-03-21 PROCEDURE — 2000000000 HC ICU R&B

## 2023-03-21 PROCEDURE — 36415 COLL VENOUS BLD VENIPUNCTURE: CPT

## 2023-03-21 PROCEDURE — 2580000003 HC RX 258: Performed by: STUDENT IN AN ORGANIZED HEALTH CARE EDUCATION/TRAINING PROGRAM

## 2023-03-21 PROCEDURE — 2720000010 HC SURG SUPPLY STERILE: Performed by: STUDENT IN AN ORGANIZED HEALTH CARE EDUCATION/TRAINING PROGRAM

## 2023-03-21 PROCEDURE — 99232 SBSQ HOSP IP/OBS MODERATE 35: CPT | Performed by: HOSPITALIST

## 2023-03-21 PROCEDURE — 85027 COMPLETE CBC AUTOMATED: CPT

## 2023-03-21 PROCEDURE — 2500000003 HC RX 250 WO HCPCS: Performed by: NURSE ANESTHETIST, CERTIFIED REGISTERED

## 2023-03-21 PROCEDURE — 36620 INSERTION CATHETER ARTERY: CPT

## 2023-03-21 PROCEDURE — 03UH0KZ SUPPLEMENT RIGHT COMMON CAROTID ARTERY WITH NONAUTOLOGOUS TISSUE SUBSTITUTE, OPEN APPROACH: ICD-10-PCS | Performed by: STUDENT IN AN ORGANIZED HEALTH CARE EDUCATION/TRAINING PROGRAM

## 2023-03-21 DEVICE — XENOSURE BIOLOGIC PATCH, 0.8CM X 8CM, EIFU
Type: IMPLANTABLE DEVICE | Site: CAROTID | Status: FUNCTIONAL
Brand: XENOSURE BIOLOGIC PATCH

## 2023-03-21 RX ORDER — SODIUM CHLORIDE 9 MG/ML
INJECTION, SOLUTION INTRAVENOUS CONTINUOUS PRN
Status: DISCONTINUED | OUTPATIENT
Start: 2023-03-21 | End: 2023-03-21 | Stop reason: SDUPTHER

## 2023-03-21 RX ORDER — ROCURONIUM BROMIDE 10 MG/ML
INJECTION, SOLUTION INTRAVENOUS PRN
Status: DISCONTINUED | OUTPATIENT
Start: 2023-03-21 | End: 2023-03-21 | Stop reason: SDUPTHER

## 2023-03-21 RX ORDER — PROTAMINE SULFATE 10 MG/ML
INJECTION, SOLUTION INTRAVENOUS PRN
Status: DISCONTINUED | OUTPATIENT
Start: 2023-03-21 | End: 2023-03-21 | Stop reason: SDUPTHER

## 2023-03-21 RX ORDER — HEPARIN SODIUM 200 [USP'U]/100ML
INJECTION, SOLUTION INTRAVENOUS CONTINUOUS PRN
Status: DISCONTINUED | OUTPATIENT
Start: 2023-03-21 | End: 2023-03-21

## 2023-03-21 RX ORDER — SODIUM CHLORIDE, SODIUM LACTATE, POTASSIUM CHLORIDE, CALCIUM CHLORIDE 600; 310; 30; 20 MG/100ML; MG/100ML; MG/100ML; MG/100ML
INJECTION, SOLUTION INTRAVENOUS CONTINUOUS PRN
Status: DISCONTINUED | OUTPATIENT
Start: 2023-03-21 | End: 2023-03-21 | Stop reason: SDUPTHER

## 2023-03-21 RX ORDER — LIDOCAINE HYDROCHLORIDE 20 MG/ML
INJECTION, SOLUTION INFILTRATION; PERINEURAL
Status: COMPLETED | OUTPATIENT
Start: 2023-03-21 | End: 2023-03-21

## 2023-03-21 RX ORDER — HEPARIN SODIUM 200 [USP'U]/100ML
INJECTION, SOLUTION INTRAVENOUS
Status: DISCONTINUED
Start: 2023-03-21 | End: 2023-03-21

## 2023-03-21 RX ORDER — LIDOCAINE HYDROCHLORIDE 20 MG/ML
INJECTION, SOLUTION EPIDURAL; INFILTRATION; INTRACAUDAL; PERINEURAL PRN
Status: DISCONTINUED | OUTPATIENT
Start: 2023-03-21 | End: 2023-03-21 | Stop reason: SDUPTHER

## 2023-03-21 RX ORDER — SODIUM CHLORIDE 9 MG/ML
INJECTION, SOLUTION INTRAVENOUS CONTINUOUS
Status: DISCONTINUED | OUTPATIENT
Start: 2023-03-21 | End: 2023-03-22

## 2023-03-21 RX ORDER — HEPARIN SODIUM 1000 [USP'U]/ML
INJECTION, SOLUTION INTRAVENOUS; SUBCUTANEOUS PRN
Status: DISCONTINUED | OUTPATIENT
Start: 2023-03-21 | End: 2023-03-21 | Stop reason: SDUPTHER

## 2023-03-21 RX ORDER — DEXAMETHASONE SODIUM PHOSPHATE 4 MG/ML
INJECTION, SOLUTION INTRA-ARTICULAR; INTRALESIONAL; INTRAMUSCULAR; INTRAVENOUS; SOFT TISSUE PRN
Status: DISCONTINUED | OUTPATIENT
Start: 2023-03-21 | End: 2023-03-21 | Stop reason: SDUPTHER

## 2023-03-21 RX ORDER — ACETAMINOPHEN 325 MG/1
650 TABLET ORAL EVERY 4 HOURS PRN
Status: DISCONTINUED | OUTPATIENT
Start: 2023-03-21 | End: 2023-03-22 | Stop reason: HOSPADM

## 2023-03-21 RX ORDER — ONDANSETRON 2 MG/ML
INJECTION INTRAMUSCULAR; INTRAVENOUS PRN
Status: DISCONTINUED | OUTPATIENT
Start: 2023-03-21 | End: 2023-03-21 | Stop reason: SDUPTHER

## 2023-03-21 RX ORDER — DEXMEDETOMIDINE HYDROCHLORIDE 100 UG/ML
INJECTION, SOLUTION INTRAVENOUS PRN
Status: DISCONTINUED | OUTPATIENT
Start: 2023-03-21 | End: 2023-03-21 | Stop reason: SDUPTHER

## 2023-03-21 RX ORDER — SUCCINYLCHOLINE/SOD CL,ISO/PF 100 MG/5ML
SYRINGE (ML) INTRAVENOUS PRN
Status: DISCONTINUED | OUTPATIENT
Start: 2023-03-21 | End: 2023-03-21 | Stop reason: SDUPTHER

## 2023-03-21 RX ORDER — MAGNESIUM SULFATE HEPTAHYDRATE 500 MG/ML
INJECTION, SOLUTION INTRAMUSCULAR; INTRAVENOUS PRN
Status: DISCONTINUED | OUTPATIENT
Start: 2023-03-21 | End: 2023-03-21 | Stop reason: SDUPTHER

## 2023-03-21 RX ORDER — FENTANYL CITRATE 50 UG/ML
INJECTION, SOLUTION INTRAMUSCULAR; INTRAVENOUS PRN
Status: DISCONTINUED | OUTPATIENT
Start: 2023-03-21 | End: 2023-03-21 | Stop reason: SDUPTHER

## 2023-03-21 RX ORDER — MORPHINE SULFATE 2 MG/ML
2 INJECTION, SOLUTION INTRAMUSCULAR; INTRAVENOUS ONCE AS NEEDED
Status: DISCONTINUED | OUTPATIENT
Start: 2023-03-21 | End: 2023-03-22 | Stop reason: HOSPADM

## 2023-03-21 RX ORDER — HYDRALAZINE HYDROCHLORIDE 20 MG/ML
10 INJECTION INTRAMUSCULAR; INTRAVENOUS EVERY 4 HOURS PRN
Status: DISCONTINUED | OUTPATIENT
Start: 2023-03-21 | End: 2023-03-22 | Stop reason: HOSPADM

## 2023-03-21 RX ORDER — PROPOFOL 10 MG/ML
INJECTION, EMULSION INTRAVENOUS PRN
Status: DISCONTINUED | OUTPATIENT
Start: 2023-03-21 | End: 2023-03-21 | Stop reason: SDUPTHER

## 2023-03-21 RX ADMIN — MAGNESIUM SULFATE HEPTAHYDRATE 1 G: 500 INJECTION, SOLUTION INTRAMUSCULAR; INTRAVENOUS at 09:00

## 2023-03-21 RX ADMIN — SODIUM CHLORIDE 2000 MG: 9 INJECTION, SOLUTION INTRAVENOUS at 09:11

## 2023-03-21 RX ADMIN — DEXAMETHASONE SODIUM PHOSPHATE 4 MG: 4 INJECTION, SOLUTION INTRAMUSCULAR; INTRAVENOUS at 09:00

## 2023-03-21 RX ADMIN — Medication 100 MG: at 09:00

## 2023-03-21 RX ADMIN — ROCURONIUM BROMIDE 30 MG: 10 INJECTION, SOLUTION INTRAVENOUS at 10:27

## 2023-03-21 RX ADMIN — SODIUM CHLORIDE: 9 INJECTION, SOLUTION INTRAVENOUS at 15:14

## 2023-03-21 RX ADMIN — PHENYLEPHRINE HYDROCHLORIDE 100 MCG: 10 INJECTION INTRAVENOUS at 09:17

## 2023-03-21 RX ADMIN — FENTANYL CITRATE 50 MCG: 50 INJECTION, SOLUTION INTRAMUSCULAR; INTRAVENOUS at 10:43

## 2023-03-21 RX ADMIN — DEXMEDETOMIDINE HYDROCHLORIDE 4 MCG: 100 INJECTION, SOLUTION INTRAVENOUS at 11:22

## 2023-03-21 RX ADMIN — PROPOFOL 150 MG: 10 INJECTION, EMULSION INTRAVENOUS at 09:00

## 2023-03-21 RX ADMIN — FENTANYL CITRATE 50 MCG: 50 INJECTION, SOLUTION INTRAMUSCULAR; INTRAVENOUS at 12:55

## 2023-03-21 RX ADMIN — PHENYLEPHRINE HYDROCHLORIDE 100 MCG: 10 INJECTION INTRAVENOUS at 09:43

## 2023-03-21 RX ADMIN — DEXMEDETOMIDINE HYDROCHLORIDE 10 MCG: 100 INJECTION, SOLUTION INTRAVENOUS at 09:35

## 2023-03-21 RX ADMIN — CIPROFLOXACIN 400 MG: 2 INJECTION, SOLUTION INTRAVENOUS at 16:21

## 2023-03-21 RX ADMIN — CIPROFLOXACIN 400 MG: 2 INJECTION, SOLUTION INTRAVENOUS at 03:23

## 2023-03-21 RX ADMIN — LIDOCAINE HYDROCHLORIDE 100 MG: 20 INJECTION, SOLUTION EPIDURAL; INFILTRATION; INTRACAUDAL; PERINEURAL at 09:00

## 2023-03-21 RX ADMIN — HEPARIN SODIUM 5000 UNITS: 1000 INJECTION, SOLUTION INTRAVENOUS; SUBCUTANEOUS at 10:07

## 2023-03-21 RX ADMIN — LIDOCAINE HYDROCHLORIDE 5 ML: 20 INJECTION, SOLUTION INFILTRATION; PERINEURAL at 09:45

## 2023-03-21 RX ADMIN — GABAPENTIN 300 MG: 300 CAPSULE ORAL at 15:12

## 2023-03-21 RX ADMIN — FENTANYL CITRATE 50 MCG: 50 INJECTION, SOLUTION INTRAMUSCULAR; INTRAVENOUS at 12:16

## 2023-03-21 RX ADMIN — GABAPENTIN 300 MG: 300 CAPSULE ORAL at 08:20

## 2023-03-21 RX ADMIN — SODIUM CHLORIDE, SODIUM LACTATE, POTASSIUM CHLORIDE, AND CALCIUM CHLORIDE: 600; 310; 30; 20 INJECTION, SOLUTION INTRAVENOUS at 08:52

## 2023-03-21 RX ADMIN — PHENYLEPHRINE HYDROCHLORIDE 100 MCG: 10 INJECTION INTRAVENOUS at 10:55

## 2023-03-21 RX ADMIN — ROCURONIUM BROMIDE 20 MG: 10 INJECTION, SOLUTION INTRAVENOUS at 09:08

## 2023-03-21 RX ADMIN — ABACAVIR SULFATE, DOLUTEGRAVIR SODIUM, LAMIVUDINE 600 MG: 600; 50; 300 TABLET, FILM COATED ORAL at 17:16

## 2023-03-21 RX ADMIN — HEPARIN SODIUM 5000 UNITS: 1000 INJECTION, SOLUTION INTRAVENOUS; SUBCUTANEOUS at 10:52

## 2023-03-21 RX ADMIN — DEXMEDETOMIDINE HYDROCHLORIDE 4 MCG: 100 INJECTION, SOLUTION INTRAVENOUS at 12:13

## 2023-03-21 RX ADMIN — DEXMEDETOMIDINE HYDROCHLORIDE 4 MCG: 100 INJECTION, SOLUTION INTRAVENOUS at 12:11

## 2023-03-21 RX ADMIN — DEXMEDETOMIDINE HYDROCHLORIDE 8 MCG: 100 INJECTION, SOLUTION INTRAVENOUS at 12:18

## 2023-03-21 RX ADMIN — ASPIRIN 81 MG: 81 TABLET, COATED ORAL at 08:20

## 2023-03-21 RX ADMIN — SODIUM CHLORIDE 2000 MG: 9 INJECTION, SOLUTION INTRAVENOUS at 12:59

## 2023-03-21 RX ADMIN — HEPARIN SODIUM 2000 UNITS: 1000 INJECTION, SOLUTION INTRAVENOUS; SUBCUTANEOUS at 11:09

## 2023-03-21 RX ADMIN — FENTANYL CITRATE 50 MCG: 50 INJECTION, SOLUTION INTRAMUSCULAR; INTRAVENOUS at 09:16

## 2023-03-21 RX ADMIN — GABAPENTIN 300 MG: 300 CAPSULE ORAL at 20:48

## 2023-03-21 RX ADMIN — ACETAMINOPHEN 650 MG: 325 TABLET ORAL at 16:15

## 2023-03-21 RX ADMIN — HEPARIN SODIUM 2000 UNITS: 1000 INJECTION, SOLUTION INTRAVENOUS; SUBCUTANEOUS at 11:31

## 2023-03-21 RX ADMIN — DEXMEDETOMIDINE HYDROCHLORIDE 4 MCG: 100 INJECTION, SOLUTION INTRAVENOUS at 12:16

## 2023-03-21 RX ADMIN — PHENYLEPHRINE HYDROCHLORIDE 15 MCG/MIN: 10 INJECTION INTRAVENOUS at 09:43

## 2023-03-21 RX ADMIN — PROTAMINE SULFATE 25 MG: 10 INJECTION, SOLUTION INTRAVENOUS at 12:32

## 2023-03-21 RX ADMIN — DEXMEDETOMIDINE HYDROCHLORIDE 10 MCG: 100 INJECTION, SOLUTION INTRAVENOUS at 09:33

## 2023-03-21 RX ADMIN — DEXMEDETOMIDINE HYDROCHLORIDE 4 MCG: 100 INJECTION, SOLUTION INTRAVENOUS at 12:14

## 2023-03-21 RX ADMIN — SODIUM CHLORIDE: 900 INJECTION, SOLUTION INTRAVENOUS at 09:46

## 2023-03-21 RX ADMIN — ONDANSETRON 4 MG: 2 INJECTION INTRAMUSCULAR; INTRAVENOUS at 09:00

## 2023-03-21 RX ADMIN — ATORVASTATIN CALCIUM 80 MG: 40 TABLET, FILM COATED ORAL at 20:48

## 2023-03-21 RX ADMIN — DEXMEDETOMIDINE HYDROCHLORIDE 6 MCG: 100 INJECTION, SOLUTION INTRAVENOUS at 12:17

## 2023-03-21 ASSESSMENT — PAIN SCALES - GENERAL
PAINLEVEL_OUTOF10: 0
PAINLEVEL_OUTOF10: 4
PAINLEVEL_OUTOF10: 0
PAINLEVEL_OUTOF10: 0

## 2023-03-21 ASSESSMENT — LIFESTYLE VARIABLES: SMOKING_STATUS: 1

## 2023-03-21 ASSESSMENT — PAIN DESCRIPTION - LOCATION: LOCATION: HEAD

## 2023-03-21 ASSESSMENT — PAIN DESCRIPTION - DESCRIPTORS: DESCRIPTORS: ACHING

## 2023-03-21 NOTE — OP NOTE
03/21/23 0000       Findings:   Soft near occlusive ulcerated plaque at the origin of the right ICA    Detailed Description of Procedure:       Informed consent had been obtained and the risks and the benefits of the procedure were explained to the patient. General anesthesia was administered and the right neck and chest were prepped and draped in the usual sterile manner. Time out was performed. Procedure began by making a 6cm longitudinal anteromedial incision using a #10 blade along the right sternocleidomastoid muscle. Incision was carried through the platysma and the right SCM was identified. The right SCM was next mobilized laterally and the right IJ was identified. Medial branches of the right internal jugular vein were ligated and the right IJ mobilized laterally. Next the patient was systemically heparinized. Next the right common carotid artery and the vagus nerve was identified. Vagus nerve was kept out of harms way for the duration of the procedure. Next the CCA was circumferentially dissected and umbilical tape was placed for proximal control. Next the right ECA was dissected and using a no touch technique the right ICA was dissected next. A test clamp was placed on the right ICA first and no EEG changes were identified. Next the CCA was clamped followed by the right ECA. Next, using a #11 blade, an anterior arteriotomy was performed on the right CCA. Using the manley scissors, arteriotomy was extended proximally and distally. Next right carotid endarterectomy was performed. Next a patch angioplasty of the right carotid artery was performed using 7-0 prolene and 6-0 prolene sutures. Patch was flushed through the ECA first and no bleeding from the suture line. Next the CCA was released and flushed through the ECA and finally the ICA was unclamped. No EEG changes for the duration of the procedure. Right neck hemostatic. Doppler confirmed low resistant flow through the right ICA.  Next

## 2023-03-21 NOTE — ANESTHESIA POSTPROCEDURE EVALUATION
Department of Anesthesiology  Postprocedure Note    Patient: Josephine Amato  MRN: [id number]  YOB: 1965  Date of evaluation: 3/21/2023      Procedure Summary     Date: 03/21/23 Room / Location: [facility] CVT 02 / [facility] CARDIAC SURGERY    Anesthesia Start: 5809 Anesthesia Stop: 0615    Procedure: RIGHT CAROTID ENDARTERECTOMY WITH ELECTROENCEPHALOGRAPHY MONITORING / Suzan Puga (Right: Neck) Diagnosis:       Acute ischemic stroke ([facility] [address]      (Acute ischemic stroke ([facility] [address] [I63.9])    Surgeons: Kamar Morris MD Responsible Provider: Jennifer Angelo MD    Anesthesia Type: General ASA Status: 3          Anesthesia Type: General    Evelio Phase I:      Evelio Phase II:        Anesthesia Post Evaluation    Patient location during evaluation: bedside  Patient participation: complete - patient participated  Level of consciousness: awake  Airway patency: patent  Nausea & Vomiting: no nausea  Complications: no  Cardiovascular status: hemodynamically stable  Respiratory status: acceptable  Hydration status: euvolemic  Multimodal analgesia pain management approach

## 2023-03-21 NOTE — ANESTHESIA PROCEDURE NOTES
Arterial Line:    An arterial line was placed using ultrasound guidance, in the OR for the following indication(s): . A  (size) (length), Arrow (type) catheter was placed, Seldinger technique used, into the right brachial artery, secured by tape and Tegaderm. Anesthesia type: General    Events:  greater than 3 attempts, patient tolerated procedure well with no complications and EBL < 5mL. 3/21/2023 9:20 AM3/21/2023 9:31 AM  Performed: Anesthesiologist   Preanesthetic Checklist  Completed: patient identified, IV checked, site marked, risks and benefits discussed, surgical/procedural consents, equipment checked, pre-op evaluation, timeout performed, anesthesia consent given, oxygen available, monitors applied/VS acknowledged, fire risk safety assessment completed and verbalized and blood product R/B/A discussed and consented

## 2023-03-21 NOTE — ANESTHESIA PRE PROCEDURE
(premix) infusion  5-30 Units/kg/hr IntraVENous Continuous Akilah Greer MD   Stopped at 03/21/23 0001    ondansetron (ZOFRAN-ODT) disintegrating tablet 4 mg  4 mg Oral Q8H PRN Maryl Guardian, APRN - NP        Or    [facility]) injection 4 mg  4 mg IntraVENous Q6H PRN Maryl Guardian, APRN - NP        senna (SENOKOT) tablet 8.6 mg  1 tablet Oral Daily PRN Maryl Guardian, APRN - NP        labetalol (NORMODYNE;TRANDATE) injection 5 mg  5 mg IntraVENous Q10 Min PRN Maryl Guardian, APRN - NP        aspirin EC tablet 81 mg  81 mg Oral Daily Maryl Guardian, APRN - NP   81 mg at 03/21/23 0820    atorvastatin (LIPITOR) tablet 80 mg  80 mg Oral Nightly Maryl Guardian, APRN - NP   80 mg at 03/20/23 2025    gabapentin (NEURONTIN) capsule 300 mg  300 mg Oral TID Maryl Guardian, APRN - NP   300 mg at 03/21/23 0820    Abacavir-Dolutegravir-Lamivud 600- MG TABS 600 mg  1 tablet Oral Daily Maryl Guardian, APRN - NP   600 mg at 03/20/23 1646       Allergies:  No Known Allergies    Problem List:    Patient Active Problem List   Diagnosis Code    Limb ischemia I99.8    Current tobacco use Z72.0    Primary hypertension I10    Obesity (BMI 30-39. 9) E66.9    Acute ischemic stroke (HCC) I63.9    Acute cystitis N30.00    History of DVT (deep vein thrombosis) Z86.718    History of HIV infection ([facility] [address] B20    Stenosis of right carotid artery I65.21       Past Medical History:        Diagnosis Date    Hypertension     Tobacco abuse        Past Surgical History:  History reviewed. No pertinent surgical history. Social History:    Social History     Tobacco Use    Smoking status: Every Day     Packs/day: 0.25     Types: Cigarettes    Smokeless tobacco: Never   Substance Use Topics    Alcohol use:  Yes     Alcohol/week: 14.0 standard drinks                                Ready to quit: Not Answered  Counseling given: Not Answered      Vital Signs (Current):   Vitals:    03/20/23 2000 03/21/23

## 2023-03-21 NOTE — PERIOP NOTE
TRANSFER - OUT REPORT:    Verbal report given to Dia Rodrigues on Woody Co  being transferred to CVT ICU for routine post-op       Report consisted of patient's Situation, Background, Assessment and   Recommendations(SBAR). Information from the following report(s) Nurse Handoff Report and Surgery Report was reviewed with the receiving nurse. [place] Assessment: No data recorded  Lines:   Peripheral IV 03/19/23 Distal;Left Antecubital (Active)   Site Assessment Clean, dry & intact 03/21/23 0955   Line Status Capped 03/21/23 0400   Line Care Connections checked and tightened 03/21/23 0400   Phlebitis Assessment No symptoms 03/21/23 0955   Infiltration Assessment 0 03/21/23 0955   Alcohol Cap Used Yes 03/21/23 0400   Dressing Status Clean, dry & intact 03/21/23 0955   Dressing Type Transparent 03/21/23 0955   Dressing Intervention New 03/19/23 1515       Peripheral IV 03/19/23 Left Wrist (Active)   Site Assessment Clean, dry & intact 03/21/23 0955   Line Status Capped 03/21/23 0400   Line Care Connections checked and tightened 03/21/23 0400   Phlebitis Assessment No symptoms 03/21/23 0955   Infiltration Assessment 0 03/21/23 0955   Alcohol Cap Used Yes 03/21/23 0400   Dressing Status Clean, dry & intact 03/21/23 0955   Dressing Type Transparent 03/21/23 0955   Dressing Intervention New 03/19/23 2027       Arterial Line 03/21/23 Brachial (Active)        Opportunity for questions and clarification was provided.       Patient transported with:  Monitor and Registered Nurse

## 2023-03-21 NOTE — ANESTHESIA PROCEDURE NOTES
Peripheral Block    Patient location during procedure: OR  Reason for block: post-op pain management and at surgeon's request  Start time: 3/21/2023 9:45 AM  End time: 3/21/2023 9:46 AM  Staffing  Performed: anesthesiologist   Anesthesiologist: Anthony Gamez MD  Preanesthetic Checklist  Completed: patient identified, IV checked, site marked, risks and benefits discussed, surgical/procedural consents, equipment checked, pre-op evaluation, timeout performed, anesthesia consent given, oxygen available, monitors applied/VS acknowledged, fire risk safety assessment completed and verbalized and blood product R/B/A discussed and consented  Peripheral Block   Patient position: supine  Prep: ChloraPrep  Provider prep: mask, sterile gloves and sterile gown  Patient monitoring: cardiac monitor, continuous pulse ox, continuous capnometry, frequent blood pressure checks, IV access, oxygen and responsive to questions  Block type: Cervical plexus  Superficial cervical  Laterality: right  Injection technique: single-shot  Guidance: other  Local infiltration: ropivacaine and lidocaine  Infiltration strength: 2 %  Local infiltration: ropivacaine and lidocaine    Needle   Needle type: pencil-tip   Needle gauge: 21 G  Needle localization: anatomical landmarks  Assessment   Injection assessment: negative aspiration for heme, no paresthesia on injection, local visualized surrounding nerve on ultrasound and no intravascular symptoms  Paresthesia pain: none  Slow fractionated injection: yes  Hemodynamics: stable  Real-time US image taken/store: yes  Outcomes: uncomplicated and patient tolerated procedure well    Medications Administered  lidocaine 2 % - Perineural   5 mL - 3/21/2023 9:45:00 AM

## 2023-03-22 ENCOUNTER — HOME HEALTH ADMISSION (OUTPATIENT)
Age: 58
End: 2023-03-22
Payer: MEDICAID

## 2023-03-22 VITALS
OXYGEN SATURATION: 99 % | TEMPERATURE: 97.6 F | HEIGHT: 67 IN | DIASTOLIC BLOOD PRESSURE: 67 MMHG | RESPIRATION RATE: 19 BRPM | WEIGHT: 220.46 LBS | HEART RATE: 75 BPM | SYSTOLIC BLOOD PRESSURE: 130 MMHG | BODY MASS INDEX: 34.6 KG/M2

## 2023-03-22 PROBLEM — N30.00 ACUTE CYSTITIS: Status: RESOLVED | Noted: 2023-03-18 | Resolved: 2023-03-22

## 2023-03-22 PROCEDURE — 2580000003 HC RX 258: Performed by: STUDENT IN AN ORGANIZED HEALTH CARE EDUCATION/TRAINING PROGRAM

## 2023-03-22 PROCEDURE — 97166 OT EVAL MOD COMPLEX 45 MIN: CPT

## 2023-03-22 PROCEDURE — 97164 PT RE-EVAL EST PLAN CARE: CPT

## 2023-03-22 PROCEDURE — 97535 SELF CARE MNGMENT TRAINING: CPT

## 2023-03-22 PROCEDURE — 6360000002 HC RX W HCPCS: Performed by: INTERNAL MEDICINE

## 2023-03-22 PROCEDURE — 6360000002 HC RX W HCPCS: Performed by: STUDENT IN AN ORGANIZED HEALTH CARE EDUCATION/TRAINING PROGRAM

## 2023-03-22 PROCEDURE — 6370000000 HC RX 637 (ALT 250 FOR IP): Performed by: STUDENT IN AN ORGANIZED HEALTH CARE EDUCATION/TRAINING PROGRAM

## 2023-03-22 PROCEDURE — 6370000000 HC RX 637 (ALT 250 FOR IP)

## 2023-03-22 PROCEDURE — 6370000000 HC RX 637 (ALT 250 FOR IP): Performed by: HOSPITALIST

## 2023-03-22 PROCEDURE — 99239 HOSP IP/OBS DSCHRG MGMT >30: CPT | Performed by: HOSPITALIST

## 2023-03-22 PROCEDURE — 94761 N-INVAS EAR/PLS OXIMETRY MLT: CPT

## 2023-03-22 RX ORDER — CIPROFLOXACIN 500 MG/1
500 TABLET, FILM COATED ORAL 2 TIMES DAILY
Qty: 14 TABLET | Refills: 0 | Status: SHIPPED | OUTPATIENT
Start: 2023-03-22 | End: 2023-03-29

## 2023-03-22 RX ORDER — ASPIRIN 81 MG/1
81 TABLET ORAL DAILY
Qty: 30 TABLET | Refills: 3 | Status: SHIPPED | OUTPATIENT
Start: 2023-03-23

## 2023-03-22 RX ORDER — AMLODIPINE BESYLATE 5 MG/1
5 TABLET ORAL
Status: COMPLETED | OUTPATIENT
Start: 2023-03-22 | End: 2023-03-22

## 2023-03-22 RX ORDER — AMLODIPINE BESYLATE 5 MG/1
5 TABLET ORAL DAILY
Qty: 30 TABLET | Refills: 0 | Status: SHIPPED | OUTPATIENT
Start: 2023-03-22

## 2023-03-22 RX ORDER — ATORVASTATIN CALCIUM 80 MG/1
80 TABLET, FILM COATED ORAL NIGHTLY
Qty: 30 TABLET | Refills: 3 | Status: SHIPPED | OUTPATIENT
Start: 2023-03-22

## 2023-03-22 RX ORDER — CIPROFLOXACIN 500 MG/1
500 TABLET, FILM COATED ORAL ONCE
Status: COMPLETED | OUTPATIENT
Start: 2023-03-22 | End: 2023-03-22

## 2023-03-22 RX ADMIN — CIPROFLOXACIN 400 MG: 2 INJECTION, SOLUTION INTRAVENOUS at 04:26

## 2023-03-22 RX ADMIN — SODIUM CHLORIDE: 9 INJECTION, SOLUTION INTRAVENOUS at 04:26

## 2023-03-22 RX ADMIN — ASPIRIN 81 MG: 81 TABLET, COATED ORAL at 08:58

## 2023-03-22 RX ADMIN — GABAPENTIN 300 MG: 300 CAPSULE ORAL at 08:58

## 2023-03-22 RX ADMIN — ACETAMINOPHEN 650 MG: 325 TABLET ORAL at 02:30

## 2023-03-22 RX ADMIN — AMLODIPINE BESYLATE 5 MG: 5 TABLET ORAL at 08:58

## 2023-03-22 RX ADMIN — CIPROFLOXACIN 500 MG: 500 TABLET, FILM COATED ORAL at 16:33

## 2023-03-22 RX ADMIN — ABACAVIR SULFATE, DOLUTEGRAVIR SODIUM, LAMIVUDINE 600 MG: 600; 50; 300 TABLET, FILM COATED ORAL at 16:33

## 2023-03-22 RX ADMIN — GABAPENTIN 300 MG: 300 CAPSULE ORAL at 13:42

## 2023-03-22 RX ADMIN — AMLODIPINE BESYLATE 5 MG: 5 TABLET ORAL at 14:00

## 2023-03-22 RX ADMIN — ACETAMINOPHEN 650 MG: 325 TABLET ORAL at 10:39

## 2023-03-22 ASSESSMENT — PAIN DESCRIPTION - DESCRIPTORS
DESCRIPTORS: ACHING

## 2023-03-22 ASSESSMENT — PAIN DESCRIPTION - ONSET
ONSET: GRADUAL
ONSET: GRADUAL

## 2023-03-22 ASSESSMENT — PAIN DESCRIPTION - ORIENTATION
ORIENTATION: POSTERIOR
ORIENTATION: RIGHT
ORIENTATION: RIGHT

## 2023-03-22 ASSESSMENT — PAIN SCALES - GENERAL
PAINLEVEL_OUTOF10: 3
PAINLEVEL_OUTOF10: 0
PAINLEVEL_OUTOF10: 10
PAINLEVEL_OUTOF10: 0
PAINLEVEL_OUTOF10: 9
PAINLEVEL_OUTOF10: 7

## 2023-03-22 ASSESSMENT — PAIN DESCRIPTION - PAIN TYPE
TYPE: SURGICAL PAIN
TYPE: SURGICAL PAIN

## 2023-03-22 ASSESSMENT — PAIN DESCRIPTION - FREQUENCY
FREQUENCY: INTERMITTENT
FREQUENCY: INTERMITTENT

## 2023-03-22 ASSESSMENT — PAIN DESCRIPTION - LOCATION
LOCATION: INCISION
LOCATION: INCISION;NECK
LOCATION: HEAD

## 2023-03-22 NOTE — CARE COORDINATION
03/22/23 1439   Service Assessment   Patient Orientation Alert and Oriented;Person;Place;Situation   Cognition Alert   History Provided By Patient   Primary Caregiver Self   Support Systems Children;Family Members   PCP Verified by CM Yes   Last Visit to PCP Within last 3 months   Prior Functional Level Independent in ADLs/IADLs   Current Functional Level Independent in ADLs/IADLs   Can patient return to prior living arrangement Yes   Ability to make needs known: Good   Family able to assist with home care needs: Yes   Financial Resources Medicaid   Social/Functional History   Lives With Alone   Type of [address] One level   Home Access Level entry   Bathroom Shower/Tub Tub/Shower unit   [facility] None   Receives Help From Family   ADL Assistance Independent   Homemaking Assistance Independent   Ambulation Assistance Independent   Transfer Assistance Independent   Occupation Full time employment   Type of Occupation CNA   Discharge Planning   Type of [address]   DME Ordered? No   Potential Assistance Purchasing Medications No   Type of [facility]   Patient expects to be discharged to: [facility] Discharge   Transition of Care Consult (CM Consult) [facility] Discharge Nursing services   Mode of Transport at Discharge Other (see comment)  (family)   Confirm Follow Up Transport Family   Condition of Participation: Discharge Planning   The Patient and/or Patient Representative was provided with a Choice of Provider? Patient   The Patient and/Or Patient Representative agree with the Discharge Plan?  Yes   Freedom of Choice list was provided with basic dialogue that supports the patient's individualized plan of care/goals, treatment preferences, and shares the quality

## 2023-03-22 NOTE — HOME CARE
Received home health referral for [facility] for SN for medication and disease management ; Discharge order noted for today; spoke to patient,Verified demographics,explained [facility] services and answered all questions and left patient with a [facility] contact card ; [facility] referral processed to [facility] central Intake and scheduling . NERI NORIEGA LPN.

## 2023-03-22 NOTE — CARE COORDINATION
Home health orders sent to [facility] - INPATIENT.   Left a message for SONA Tatum RN  Care Management

## 2023-03-22 NOTE — PLAN OF CARE
Problem: ABCDS Injury Assessment  Goal: Absence of physical injury  Outcome: Progressing
juices?     OBJECTIVE:     Past Medical History:   Diagnosis Date    Hypertension     Tobacco abuse    History reviewed. No pertinent surgical history. Prior Level of Function/Home Situation:  Social/Functional History  Lives With: Alone  Type of Home: House  Home Layout: One level  Home Equipment: None    Baseline Assessment:  Baseline Assessment  Temperature Spikes Noted: No  Respiratory Status: Room air  Communication Observation: Functional  Follows Directions: Simple  Current Diet : Regular  Current Liquid Diet : Thin  Dentition: Adequate  Patient Positioning: Upright in bed  Baseline Vocal Quality: Normal    Orientation:  x4    Oral Assessment:  Oral Motor   Labial: No impairment  Dentition: Natural  Oral Hygiene: Moist  Oral Hygiene Comments: Good  Lingual: No impairment  Velum: No Impairment  Mandible: No impairment  Gag: No Impairment     P.O.  Trials:  PO Trials  Neuromuscular Estim Used: No  Assessment Method(s): Observation;Palpation  Patient Position: [facility] at [facility]  Vocal Quality: No Impairment  Consistency Presented: Thin;Regular  How Presented: Self-fed/presented;Straw;Successive Swallows  Bolus Acceptance: No impairment  Bolus Formation/Control: No impairment     PAIN:  Start of Eval: not reported   End of Eval: not reported      After evaluation:   []            Patient left in no apparent distress sitting up in chair  [x]            Patient left in no apparent distress in bed  [x]            Call bell left within reach  [x]            Nursing notified  []            Family present  []            Caregiver present  []            Bed alarm activated      COMMUNICATION/EDUCATION:   [x]            Aspiration precautions; swallow safety; compensatory techniques provided via demonstration, verbalization and teach back of comprehension; understanding verbalized     Thank you for this referral.  Fonda Gilford, M.S., CCC-SLP

## 2023-03-22 NOTE — PROGRESS NOTES
conducted an initial consultation and Spiritual Assessment for Dorota Rincon, who is a 62 y.o.,female. Patient's Primary Language is: [place]. According to the patient's EMR Gnosticist Affiliation is: Other. The reason the Patient came to the hospital is:   Patient Active Problem List    Diagnosis Date Noted    Acute ischemic stroke ([facility] 03/18/2023    Acute cystitis 03/18/2023    History of DVT (deep vein thrombosis) 03/18/2023    History of HIV infection ([facility] 03/18/2023    Current tobacco use 12/22/2022    Primary hypertension 12/22/2022    Obesity (BMI 30-39.9) 12/22/2022    Limb ischemia 12/21/2022        The  provided the following Interventions:  Initiated a relationship of care and support. Listened empathically. Provided information about Spiritual Care Services. Chart reviewed. The following outcomes where achieved:   confirmed Patient's Gnosticist Affiliation. Patient expressed gratitude for 's visit. Assessment:  Patient does not have any Shinto/cultural needs that will affect patient's preferences in health care. There are no spiritual or Shinto issues which require intervention at this time. Plan:  Chaplains will continue to follow and will provide pastoral care on an as needed/requested basis.  recommends bedside caregivers page  on duty if patient shows signs of acute spiritual or emotional distress.     [address]   [phone]
1003: TRANSFER - IN REPORT:    Verbal report received from ROSIE Montes on Jorge Snow  being received from Emergency Department for routine progression of patient care      Report consisted of patient's Situation, Background, Assessment and   Recommendations(SBAR). Information from the following report(s) Nurse Handoff Report, Index, and ED SBAR was reviewed with the receiving nurse. Opportunity for questions and clarification was provided. Assessment completed upon patient's arrival to unit and care assumed. 1027: Pt arrived to unit via stretcher accompanied by Gabby Hodge RN and ED tech. Pt A/Ox4. No complaints of pain or discomfort. Pt in NAD. Dual skin assessment completed with Yen Shaffer RN. NIH, 0. Admission assessment complete. Admission database complete. Pt instructed on use of call light, and to all with all needs. Bed left in lowest position, call light in reach, bed alarm set. 1035: Lab at bedside to draw blood. Pt expressing discomfort with the procedure. 1130: MD at bedside. 1315: Vascular MD on unit. Inquires if pt is on heparin gtt. 1355: Heparin gtt is ordered. 1400: Lab is on unit, draws APTT. 1525: Heparin gtt started. 1600: Pt's sister at bedside. The sister inquired about being more involved with pt's care and requests to be notified before procedures happen to help better explain to the patient. 1730: Assisted pt to BR.    1930: Pt heard yelling from room. In room, pt's IV was removed on accident. This RN and ROSIE Bowen cleaned pt, and hooked heparin to another IV site. 1935: Bedside and Verbal shift change report given to Santi Kirby RN (oncoming nurse) by Ck Nava RN (offgoing nurse). Report included the following information Nurse Handoff Report, Index, Intake/Output, MAR, Recent Results, and Cardiac Rhythm NSR .
10:00  Negron cathatar removed. 10:52  Therapy office called regarding OT/PT eval prior to discharge today. Pt states she will not have transportation home until 17:00  13:45  Pt with son at bedside. Pt BP elevated with SBP 160s. Will require dose of prn hydralazine. Son states can not stay. Pt will have to wait for sister to come after work (17:00). Pt unhappy. Found norvasc pill on floor. Unsure if from this am.    13:50  Dr. Alexandria Sumner at bedside. Will give dose of norvasc 5 mg. Pt's medications filled from [facility] and placed in pt personal bag to take home at discharge. 16:50  Pt dressed. IV removed. Discharge paperwork discussed with pt including follow-up appointments, medication regime, sings and symptoms to report to Dr. Alexandria Sumner (s/s infection of surgical site) and those to call 911 (s/s heart attack, stroke, inability to breath). Discussed activity expectations (walking) and limitations (no strenuous activity, no lifting greater than 5 lbs, no driving until cleared by surgeon). Discussed heart healthy diet and need to avoid smoking. Discussed care of incision site (ok to shower and pat dry but no salves, ointments, lotions, powders to site). Discussed new medications (cipro, baby aspirin, lipitor) and their timing. Discussed need to stop taking eliquis. Time given for questions and discussion. Pt very anxious to leave. .  Educated on need for ride to be at curbside before leaving unit. 17:40  Pt's sister at curb of Heart and Vascular door. Pt left unit via wheelchair accompanied by RN. Pt had all belongings including cell phone and , glasses, upper/lower dentures, ffowers and discharge paperwork in possession at discharge. Pt assisted into passenger side of pt's sister's vehicle. Seat belt fastened.
1321> [facility].     1326> Patient arrived in the unit per bed. Patient is alert, confused, no VSS. 1545> Portia removed at this time. Pressure applied. No hematoma noted.     1600> Patient is more awake for complete assessment. Patient oriented x 4, equal strength on all extremities but complains of numbness on left toes. Patient is complaining head ache. Dr. Josy Celestin at bedside and was updated with patient's condition. MD ordered tyelenol for headache.     1930> Bedside and Verbal shift change report given to Megan Dickson RN (oncoming nurse) by Jacob Estevez RN (offgoing nurse). Report included the following information Nurse Handoff Report, Index, Intake/Output, MAR, Recent Results, and Cardiac Rhythm NSR .     2000> Dr. Varinder Cook updated on patient's condition. MD verbalized that patient may have clear liquids, icecream, juice. Informed RN.
Bedside and Verbal shift change report given to [address] [place] [address] (oncoming nurse) by Ck Nava RN (offgoing nurse). Report included the following information Nurse Handoff Report, Intake/Output, MAR, Recent Results, and Cardiac Rhythm SR. Quietly resting in bed. No SOB on RA. Denies any pain or discomfort at this time. On Heparin gtt at 12 units/kg/hr or 11.5 ml/hr. Infusing to Left AC. Call light within reach. Bed exit alarm on. Patient accidentally pulled PIV line out. 2027: Placed new PIV line to Left wrist gauge #20.     2100: Patient refused blood draw for due APTT. Explanation provided. 2134: Due meds given. 2319: APTT 37.7. Heparin gtt rate adjusted to 13 units/kg/hr per protocol. Next APTT due at 0530.    0000: Patient would like to sleep. Refused NIH assessment. 0350: No change from previous assessment. 0600: Slept good thru night. Needs attended. 1764: Bedside and Verbal shift change report given to [facility] (oncoming nurse) by Santi Kirby RN (offgoing nurse).  Report included the following information Nurse Handoff Report, Intake/Output, MAR, Recent Results, and Cardiac Rhythm SR .
Brief Neurology Note    Contacted by Dr. Kanu Bartlett who is admitted Ms. Alonzo. Patient reportedly on Eliquis for PAD. Medication was started back in December 2022 when admitted and found to have PAD. Note from vascular surgery at that time documents a plan of oral anticoagulation with Eliquis for left acute peroneal vein DVT for at least 3 months in addition to Aspirin 81mg. Unclear whether pt is compliant with treatment. Advised to contact vascular to discuss management at this point (dual therapy w/ anticoagulant and antiplatelet vs. Monotherapy with either anticoagulation or antiplatelet). From a stroke standpoint she does not need to be on both therapies but this may matter from a vascular perspective. Full neurology consult to follow.     Dionicio Estrada MD  Neurology
Dual skin assessment completed with Venkatesh Cadet RN. Sacrum with no pressure injuries or moisture associated skin breakdown.     Review of Systems    Physical Exam  Skin:
OCCUPATIONAL THERAPY EVALUATION/DISCHARGE    Patient: Gissel Bradley (74 y.o. female)  Date: 3/22/2023  Primary Diagnosis: Numbness [R20.0]  Acute ischemic stroke (HCC) [I63.9]  Numbness in feet [R20.0]  Urinary tract infection without hematuria, site unspecified [N39.0]  Acute stroke due to ischemia (HCC) [I63.9]  Procedure(s) (LRB):  RIGHT CAROTID ENDARTERECTOMY WITH ELECTROENCEPHALOGRAPHY MONITORING / PATCH ANGIOPLASTY (Right) 1 Day Post-Op   Precautions: None, Up Ad Aileen,  ,  ,  ,  ,  ,  ,    PLOF: independent with ADLs and functional mobility, works as a CNA     ASSESSMENT AND RECOMMENDATIONS:  Nursing/RN cleared for pt to participate in OT evaluation and tx session. Patient presents sitting in recliner chair. LB dress: Mod I doff and don slipper sock using crossing leg method, Mod I thread long johns and pants over feet while sitting and hiking up over hips in stance. Toilet transfer: Mod I w/o AD and good balance, Mod I toilet tasks and washing hands in stance at sink, pt instructed of use of energy conservation technique of sitting versus standing to perform grooming tasks at sink side, pt verbalized understanding and provided good return demonstration while styling hair, washing her face and putting on makeup. Patient sitting up in recliner at end of tx session, Patient verbalized understanding to utilize call bell to request assist as needed. Nursing notified of pt's level of assist with toilet transfer. Patient presents at baseline as PLOF with ADLs and functional mobility. Patient verbalized understanding of skilled OT services not indicated at this time while in acute hospital.     Maximum therapeutic gains met at current level of care and patient will be discharged from occupational therapy at this time.     Further Equipment Recommendations for Discharge: shower chair for energy conservation    [facility]:   At this time and based on an AM-PAC score of 24/24, no further OT is recommended upon discharge
Occupational Therapy Note:  Orders received, chart reviewed, and this patient has been documented that she is back to baseline and currently has no functional deficits at this time (including ROM and strength). Her symptoms have resolved. Will sign off and will be available should her needs change or concerns arise.  Monica Clemons, OTR/L
Physical Therapy    PHYSICAL THERAPY RE-EVALUATION/DISCHARGE    Patient: Sheri Walker (78 y.o. female)  Date: 3/22/2023  Primary Diagnosis: Numbness [R20.0]  Acute ischemic stroke (HCC) [I63.9]  Numbness in feet [R20.0]  Urinary tract infection without hematuria, site unspecified [N39.0]  Acute stroke due to ischemia (HCC) [I63.9]  Procedure(s) (LRB):  RIGHT CAROTID ENDARTERECTOMY WITH ELECTROENCEPHALOGRAPHY MONITORING / PATCH ANGIOPLASTY (Right) 1 Day Post-Op   Precautions: None, Up Ad Aileen  PLOF: ind PTA, lives alone in a 1 SH, no DME      ASSESSMENT AND RECOMMENDATIONS:  Pt re-evaluted s/p R endarterectomy. Pt endorses no strength or ROM changes. Does report L foot numbness however does not affect functional mobility. Pt is mod ind with sit <> stand from recliner and gait within room with no AD. Pt with no LOB noted. Pt does not endorse any functional mobility concerns and is ready to go home. Will sign off from caseload, left in recliner with all needs met, nursing updated on progress. Patient does not require further skilled physical therapy intervention at this level of care. Further Equipment Recommendations for Discharge:  n/a    AMPAC: 24/24    This AMPAC score should be considered in conjunction with interdisciplinary team recommendations to determine the most appropriate discharge setting. Patient's social support, diagnosis, medical stability, and prior level of function should also be taken into consideration. SUBJECTIVE:   Patient stated I am going home today.     OBJECTIVE DATA SUMMARY:     Past Medical History:   Diagnosis Date    Hypertension     Tobacco abuse      Past Surgical History:   Procedure Laterality Date    CAROTID ENDARTERECTOMY Right 3/21/2023    RIGHT CAROTID ENDARTERECTOMY WITH ELECTROENCEPHALOGRAPHY MONITORING / PATCH ANGIOPLASTY performed by Hesham Piper MD at [address] [place] [address] Situation:  Social/Functional History  Lives With: Alone  Type of
Received discharge prescriptions for patient at outpatient pharmacy. Patient has no copay. Patient Norvasc is refill too soon 4/9/23.
SLP Note:    New evaluation orders received and attempted; however, pt receiving direct nursing care after just arriving on unit. Will address as pt able to participate.      Thank you for this referral,   Erica Zhong M.S., CCC-SLP
TRANSFER - IN REPORT:    Verbal report received on [address]  being received from [facility] for ordered procedure      Report consisted of patient's Situation, Background, Assessment and   Recommendations(SBAR). Information from the following report(s) Intake/Output, MAR, and Pre Procedure Checklist was reviewed with the receiving nurse. Opportunity for questions and clarification was provided. Assessment completed upon patient's arrival to unit and care assumed.
Vascular Surgery     58yo F with right parietal lobe infarct with severe right carotid stenosis. Plan for right carotid endarterectomy with intraoperative electroencephalography. Informed consent obtained, patient understands the risks and benefits and the need for surgery.   Plan for post op ICU for neurovascular monitoring  Patient to be maintained on ASA alone post-op  Appreciate ID management    Kamilla Hendrix MD
Vascular Surgery     Patient seen and evaluated. Doing well s/p right carotid endarterectomy POD 1 for right parietal lobe CVA. Patient doing well, neck soft dry and clean without any hematoma. Neuro intact  Maintained on ASA and Statin  Follow up in clinic in 2 weeks with carotid duplex    Jonas Sinha MD        Admit Date: 3/17/2023    POD 1 Day Post-Op    Procedure:  Procedure(s):  RIGHT CAROTID ENDARTERECTOMY WITH ELECTROENCEPHALOGRAPHY MONITORING / PATCH ANGIOPLASTY    Subjective:   Patient seen, chart reviewed, all acute events noted. Patient is awake alert and answers all questions appropriately. Moves all extremities to command. Patient is postop day #1 from her recent right carotid endarterectomy done by Dr. Jose Montiel. Operative Note        Patient: Irish Grullon  YOB: 1965  MRN: [id number]     Date of Procedure: 3/21/2023     Pre-Op Diagnosis: Acute ischemic stroke ([facility] [I63.9]     Post-Op Diagnosis: Same       Procedure(s):  RIGHT CAROTID ENDARTERECTOMY WITH ELECTROENCEPHALOGRAPHY MONITORING / [address] ANGIOPLASTY     Surgeon: Jonas Sinha MD     Cosurgeon: Dr. Marisela Bui MD     Assistant:   * No surgical staff found *     Anesthesia: General     Estimated Blood Loss (mL): less than 50      Complications: None  Patient states she feels well. Tolerating her diet with no nausea and vomiting and denies any swallowing difficulties. Patient was able to get out of bed and ambulate to a chair without any weakness or dizziness. Patient reports that she feels weak but comfortable. Very appreciative of our services and states that she is willing to go home when medically cleared. Denies any chest pain or shortness of breath. Denies any dyspnea on exertion. As stated she is tolerating her diet with no nausea vomiting. Patient still has her Negron catheter in place, will remove it immediately. Patient denies any leg pain or discomfort.   Objective:     Blood pressure
[facility] Hospitalist Group  Progress Note    Patient: Libby Paris Age: 62 y.o. : 1965 MR#: [id number] SSN: [id number]  Date/Time: 3/20/2023     Subjective: Patient lying in the bed, feels better, speech improving. Assessment/Plan:   Acute ischemic stroke, MRI of the brain showed multiple small foci of acute ischemia within the right parietal lobe, small focus of ischemia in the right occipital lobe, small remote right white matter lacunar infarcts. Right ICA >80% stenosis  Hypertension  History of DVT, has been on Eliquis  HIV, asymptomatic  Acute cystitis without hematuria  Tobacco smoking abuse  Hyperlipidemia        Plan  Continue aspirin, IV heparin and statin  Plan for CEA on Tuesday  We will start low-dose of amlodipine and monitor blood pressure  Continue HIV medications  Continue IV fluids  Continue ceftriaxone, cultures noted  PT/OT eval and treatment  Advised on smoking cessation    Discussed with the patient at the bedside and explained in detail about my above plan care, after obtaining permission from the patient, called patient's Sister Betty Lucas and explained in detail about my above plan care. Discussed with them about risk and benefits of surgery, both of them understood and agreed with proceeding with surgery.       Case discussed with:  [x]Patient  [x]Family  [x]Nursing  []Case Management  DVT Prophylaxis:  []Lovenox  [x]Hep IV  []SCDs  []Coumadin   []Eliquis/Xarelto     Objective:   VS: BP (!) 152/74   Pulse 74   Temp 98 °F (36.7 °C) (Oral)   Resp 19   Ht 5' 7\" (1.702 m)   Wt 211 lb (95.7 kg)   SpO2 100%   BMI 33.05 kg/m²    Tmax/24hrs: Temp (24hrs), Av °F (36.7 °C), Min:97.6 °F (36.4 °C), Max:98.2 °F (36.8 °C)  IOBRIEF  Intake/Output Summary (Last 24 hours) at 3/20/2023 1723  Last data filed at 3/20/2023 1233  Gross per 24 hour   Intake 1025.73 ml   Output 1450 ml   Net -424.27 ml       General:  Alert, cooperative, no acute distress
Objective:   VS: BP (!) 155/83   Pulse 77   Temp 97.1 °F (36.2 °C)   Resp 20   Wt 211 lb (95.7 kg)   SpO2 100%   BMI 34.06 kg/m²    Tmax/24hrs: Temp (24hrs), Av.8 °F (36.6 °C), Min:97.1 °F (36.2 °C), Max:98.5 °F (36.9 °C)  IOBRIEFNo intake or output data in the 24 hours ending 23 1129    General:  Alert, cooperative, no acute distress    HEENT: PERRLA, anicteric sclerae. Pulmonary:  CTA Bilaterally. No Wheezing/Rales. Cardiovascular: Regular rate and Rhythm. GI:  Soft, Non distended, Non tender. + Bowel sounds. Extremities:  No edema. No calf tenderness. Psych: Good insight. Not anxious or agitated. Neurologic: Alert and oriented X 3.   Slight slurred speech, no facial droop, motor strength 5/5 all extremities   Additional:    Medications:   Current Facility-Administered Medications   Medication Dose Route Frequency    0.9 % sodium chloride infusion   IntraVENous Continuous    ondansetron (ZOFRAN-ODT) disintegrating tablet 4 mg  4 mg Oral Q8H PRN    Or    ondansetron (ZOFRAN) injection 4 mg  4 mg IntraVENous Q6H PRN    senna (SENOKOT) tablet 8.6 mg  1 tablet Oral Daily PRN    labetalol (NORMODYNE;TRANDATE) injection 5 mg  5 mg IntraVENous Q10 Min PRN    aspirin EC tablet 81 mg  81 mg Oral Daily    Or    aspirin suppository 300 mg  300 mg Rectal Daily    atorvastatin (LIPITOR) tablet 80 mg  80 mg Oral Nightly    cefTRIAXone (ROCEPHIN) 1,000 mg in sterile water 10 mL IV syringe  1,000 mg IntraVENous Q24H    gabapentin (NEURONTIN) capsule 300 mg  300 mg Oral TID    Abacavir-Dolutegravir-Lamivud 600- MG TABS 600 mg  1 tablet Oral Daily       Labs:    Recent Results (from the past 24 hour(s))   Lipid Panel    Collection Time: 23  3:35 PM   Result Value Ref Range    LIPID PANEL          Cholesterol, Total 180 <200 MG/DL    Triglycerides 127 <150 MG/DL    HDL 36 (L) 40 - 60 MG/DL    LDL Calculated 118.6 (H) 0 - 100 MG/DL    VLDL Cholesterol Calculated 25.4 MG/DL    Chol/HDL Ratio 5.0 0
facial droop, motor strength 5/5 all extremities   Additional:    Labs:   Recent Results (from the past 24 hour(s))   CBC    Collection Time: 03/19/23  2:01 PM   Result Value Ref Range    WBC 4.5 (L) 4.6 - 13.2 K/uL    RBC 4.40 4.20 - 5.30 M/uL    Hemoglobin 12.1 12.0 - 16.0 g/dL    Hematocrit 39.9 35.0 - 45.0 %    MCV 90.7 78.0 - 100.0 FL    MCH 27.5 24.0 - 34.0 PG    MCHC 30.3 (L) 31.0 - 37.0 g/dL    RDW 14.0 11.6 - 14.5 %    Platelets 539 719 - 007 K/uL    MPV 9.5 9.2 - 11.8 FL    Nucleated RBCs 0.0 0  WBC    nRBC 0.00 0.00 - 0.01 K/uL   APTT    Collection Time: 03/19/23  2:01 PM   Result Value Ref Range    PTT 24.0 23.0 - 36.4 SEC   APTT    Collection Time: 03/19/23 10:20 PM   Result Value Ref Range    PTT 37.7 (H) 23.0 - 36.4 SEC   Basic Metabolic Panel    Collection Time: 03/20/23  4:25 AM   Result Value Ref Range    Sodium 140 136 - 145 mmol/L    Potassium 3.7 3.5 - 5.5 mmol/L    Chloride 112 (H) 100 - 111 mmol/L    CO2 23 21 - 32 mmol/L    Anion Gap 5 3.0 - 18 mmol/L    Glucose 132 (H) 74 - 99 mg/dL    BUN 9 7.0 - 18 MG/DL    Creatinine 0.70 0.6 - 1.3 MG/DL    Bun/Cre Ratio 13 12 - 20      Est, Glom Filt Rate >60 >60 ml/min/1.73m2    Calcium 8.7 8.5 - 10.1 MG/DL   CBC with Auto Differential    Collection Time: 03/20/23  4:25 AM   Result Value Ref Range    WBC 4.3 (L) 4.6 - 13.2 K/uL    RBC 4.07 (L) 4.20 - 5.30 M/uL    Hemoglobin 11.3 (L) 12.0 - 16.0 g/dL    Hematocrit 36.3 35.0 - 45.0 %    MCV 89.2 78.0 - 100.0 FL    MCH 27.8 24.0 - 34.0 PG    MCHC 31.1 31.0 - 37.0 g/dL    RDW 13.8 11.6 - 14.5 %    Platelets 956 731 - 926 K/uL    MPV 9.7 9.2 - 11.8 FL    Nucleated RBCs 0.0 0  WBC    nRBC 0.00 0.00 - 0.01 K/uL    Seg Neutrophils 47 40 - 73 %    Lymphocytes 43 21 - 52 %    Monocytes 6 3 - 10 %    Eosinophils % 2 0 - 5 %    Basophils 1 0 - 2 %    Immature Granulocytes 1 (H) 0.0 - 0.5 %    Segs Absolute 2.0 1.8 - 8.0 K/UL    Absolute Lymph # 1.8 0.9 - 3.6 K/UL    Absolute Mono # 0.3 0.05 - 1.2 K/UL
sounds. Extremities:  No edema. No calf tenderness. Psych: Good insight. Not anxious or agitated. Neurologic: Alert and oriented X 3.   Speech more clear today, no facial droop, motor strength 5/5 all extremities   Additional: Right neck surgical site clean, no swelling    Medications:   Current Facility-Administered Medications   Medication Dose Route Frequency    lidocaine 1 % injection        hydrALAZINE (APRESOLINE) injection 10 mg  10 mg IntraVENous Q4H PRN    morphine (PF) injection 2 mg  2 mg IntraVENous Once PRN    0.9 % sodium chloride infusion   IntraVENous Continuous    acetaminophen (TYLENOL) tablet 650 mg  650 mg Oral Q4H PRN    ciprofloxacin (CIPRO) IVPB 400 mg  400 mg IntraVENous Q12H    amLODIPine (NORVASC) tablet 5 mg  5 mg Oral Daily    ondansetron (ZOFRAN-ODT) disintegrating tablet 4 mg  4 mg Oral Q8H PRN    Or    ondansetron (ZOFRAN) injection 4 mg  4 mg IntraVENous Q6H PRN    senna (SENOKOT) tablet 8.6 mg  1 tablet Oral Daily PRN    labetalol (NORMODYNE;TRANDATE) injection 5 mg  5 mg IntraVENous Q10 Min PRN    aspirin EC tablet 81 mg  81 mg Oral Daily    atorvastatin (LIPITOR) tablet 80 mg  80 mg Oral Nightly    gabapentin (NEURONTIN) capsule 300 mg  300 mg Oral TID    Abacavir-Dolutegravir-Lamivud 600- MG TABS 600 mg  1 tablet Oral Daily       Labs:    Recent Results (from the past 24 hour(s))   APTT    Collection Time: 03/20/23  7:42 PM   Result Value Ref Range    PTT 70.8 (H) 23.0 - 36.4 SEC   APTT    Collection Time: 03/21/23  4:25 AM   Result Value Ref Range    PTT 24.5 23.0 - 36.4 SEC   CBC    Collection Time: 03/21/23  4:25 AM   Result Value Ref Range    WBC 4.6 4.6 - 13.2 K/uL    RBC 3.89 (L) 4.20 - 5.30 M/uL    Hemoglobin 10.9 (L) 12.0 - 16.0 g/dL    Hematocrit 34.2 (L) 35.0 - 45.0 %    MCV 87.9 78.0 - 100.0 FL    MCH 28.0 24.0 - 34.0 PG    MCHC 31.9 31.0 - 37.0 g/dL    RDW 13.8 11.6 - 14.5 %    Platelets 842 678 - 826 K/uL    MPV 9.5 9.2 - 11.8 FL    Nucleated RBCs 0.0 0 PER
stroke ([facility] [I63.9]  Numbness in feet [R20.0]  Urinary tract infection without hematuria, site unspecified [N39.0]  Acute stroke due to ischemia [facility]) [I63.9] Acute ischemic stroke ([facility]    Precautions:         []  Heel prevention boots placed on patient    [x]  Patient turned q2h during shift    []  Lift team ordered    [x]  Patient on [facility] bed/Specialty bed    [x]  Each Wound is documented during shift (Stage, Color, drainage, odor, measurements, and dressings)    [x]  Dual skin check done with [facility] RN    Gene Watson RN
Outcome: Verbalized understanding    Thank you for this referral.  Mara Gonzalez, PT  Minutes: 8      Eval Complexity: Decision Making: LORY Ross 39 AM-PAC® Basic Mobility Inpatient Short Form (6-Clicks) Version 2    How much HELP from another person does the patient currently need    (If the patient hasn't done an activity recently, how much help from another person do you think he/she would need if he/she tried?)   Total (Total A or Dep)   A Lot  (Mod to Max A)   A Little (Sup or Min A)   None (Mod I to I)   Turning from your back to your side while in a flat bed without using bedrails? [] 1 [] 2 [] 3 [x] 4   2. Moving from lying on your back to sitting on the side of a flat bed without using bedrails? [] 1 [] 2 [] 3 [x] 4   3. Moving to and from a bed to a chair (including a wheelchair)? [] 1 [] 2 [] 3 [x] 4   4. Standing up from a chair using your arms (e.g., wheelchair, or bedside chair)? [] 1 [] 2 [] 3 [x] 4   5. Walking in hospital room? [] 1 [] 2 [] 3 [x] 4   6. Climbing 3-5 steps with a railing?+   [] 1 [] 2 [] 3 [x] 4   +If stair climbing cannot be assessed, skip item #6. Sum responses from items 1-5. At this time and based on an AM-PAC score of 24/24 (or **/20 if omitting stairs), no further PT is recommended upon discharge due to (i.e. patient at baseline functional statusetc). Recommend patient returns to prior setting with prior services.
kg/m²     ROS:     Physical Exam:        Labs: Results:   Chemistry Recent Labs     03/19/23  1022 03/20/23  0425 03/20/23  1220   GLUCOSE 108* 132* 93    140 141   K 4.6 3.7 4.1   * 112* 110   CO2 24 23 29   BUN 14 9 7   CREATININE 0.71 0.70 0.66   GLOB  --   --  4.3*   ALT  --   --  23   AST  --   --  15        CBC w/Diff Recent Labs     03/19/23  1401 03/20/23  0425 03/21/23  0425   WBC 4.5* 4.3* 4.6   RBC 4.40 4.07* 3.89*   HGB 12.1 11.3* 10.9*   HCT 39.9 36.3 34.2*    231 244        Microbiology Results       Procedure Component Value Units Date/Time    Culture, Urine [[id number]]  (Abnormal)  (Susceptibility) Collected: 03/17/23 2250    Order Status: Completed Specimen: Urine, clean catch Updated: 03/20/23 1417     Special Requests NO SPECIAL REQUESTS        [place] count --        >100,000  COLONIES/mL       Culture       ENTEROBACTER CLOACAE COMPLEX          Susceptibility        Enterobacter cloacae complex      BACTERIAL SUSCEPTIBILITY PANEL DRU      amikacin <=2 ug/mL Sensitive      ceFAZolin <=4 ug/mL Resistant      cefepime <=1 ug/mL Sensitive      cefOXitin >=64 ug/mL Resistant      cefTAZidime <=1 ug/mL Sensitive      cefTRIAXone <=1 ug/mL Sensitive      ciprofloxacin <=0.25 ug/mL Sensitive      gentamicin <=1 ug/mL Sensitive      levofloxacin <=0.12 ug/mL Sensitive      meropenem <=0.25 ug/mL Sensitive      nitrofurantoin 32 ug/mL Sensitive      tobramycin <=1 ug/mL Sensitive      trimethoprim-sulfamethoxazole <=20 ug/mL Sensitive                                       RADIOLOGY:    All available imaging studies/reports in [facility] for this admission were reviewed      Disclaimer: Sections of this note are dictated utilizing voice recognition software, which may have resulted in some phonetic based errors in grammar and contents. Even though attempts were made to correct all the mistakes, some may have been missed, and remained in the body of the document.  If questions arise,
ciprofloxacin <=0.25 ug/mL Sensitive      gentamicin <=1 ug/mL Sensitive      levofloxacin <=0.12 ug/mL Sensitive      meropenem <=0.25 ug/mL Sensitive      nitrofurantoin 32 ug/mL Sensitive      tobramycin <=1 ug/mL Sensitive      trimethoprim-sulfamethoxazole <=20 ug/mL Sensitive                                       RADIOLOGY:    All available imaging studies/reports in [facility] for this admission were reviewed      Disclaimer: Sections of this note are dictated utilizing voice recognition software, which may have resulted in some phonetic based errors in grammar and contents. Even though attempts were made to correct all the mistakes, some may have been missed, and remained in the body of the document. If questions arise, please contact our department.     Dr. Félix Franco, Infectious Disease Specialist  [phone]  March 22, 2023  11:08 AM

## 2023-03-22 NOTE — CARE COORDINATION
Discharge order noted for today. Pt has been accepted to [facility] agency. Met with patient  and are agreeable to the transition plan today. Transport has been arranged through pt's son. Patient's discharge summary and home health  orders have been forwarded to [facility] home health  agency . Updated bedside RN, ,  to the transition plan.   Discharge information has been documented on the AVS.           SONA Muir RN  Care Management

## 2023-03-22 NOTE — DISCHARGE SUMMARY
1 tablet by mouth daily  Qty: 30 tablet, Refills: 3      atorvastatin (LIPITOR) 80 MG tablet Take 1 tablet by mouth nightly  Qty: 30 tablet, Refills: 3      ciprofloxacin (CIPRO) 500 MG tablet Take 1 tablet by mouth 2 times daily for 7 days  Qty: 14 tablet, Refills: 0           CONTINUE these medications which have CHANGED    Details   amLODIPine (NORVASC) 5 MG tablet Take 1 tablet by mouth daily  Qty: 30 tablet, Refills: 0           CONTINUE these medications which have NOT CHANGED    Details   gabapentin (NEURONTIN) 300 MG capsule       Abacavir-Dolutegravir-Lamivud (TRIUMEQ) 600- MG TABS Take 1 tablet by mouth daily      acetaminophen (TYLENOL) 500 MG tablet Take 650 mg by mouth every 6 hours as needed      vitamin D (CHOLECALCIFEROL) 73671 UNIT CAPS Take 1 capsule by mouth daily      methocarbamol (ROBAXIN) 500 MG tablet Take 500 mg by mouth 4 times daily      potassium chloride (KLOR-CON) 10 MEQ extended release tablet Take 10 mEq by mouth daily           STOP taking these medications       ELIQUIS 5 MG TABS tablet Comments:   Reason for Stopping: It is important that you take the medication exactly as they are prescribed. Keep your medication in the bottles provided by the pharmacist and keep a list of the medication names, dosages, and times to be taken in your wallet. Do not take other medications without consulting your doctor. DIET:  cardiac diet    ACTIVITY: activity as tolerated, no driving     Home health care for [facility] for acute CVA    ADDITIONAL INFORMATION: If you experience any of the following symptoms but not limited to Fever, chills, nausea, vomiting, diarrhea, change in mentation, falling, bleeding, shortness of breath, chest pain, please call your primary care physician or return to the emergency room if you cannot get hold of your doctor:     FOLLOW UP CARE:  Dr. Keisha Harvey in 5-7 days. Please call and set up an appointment.   Dr. James Libman Sx in 1 week  Dr. Hmoa Burden,

## 2023-03-25 ENCOUNTER — HOME CARE VISIT (OUTPATIENT)
Age: 58
End: 2023-03-25
Payer: MEDICAID

## 2023-03-25 PROCEDURE — G0299 HHS/HOSPICE OF RN EA 15 MIN: HCPCS

## 2023-03-25 NOTE — Clinical Note
Completed [place] [id number] admission 3/25/23 for disease and medication management. Monitor R carotid surgical wound care for healing and s/s of infection. SN freq 1d1, 2w1, 1w1, 2 prn.

## 2023-03-26 VITALS
HEART RATE: 96 BPM | OXYGEN SATURATION: 98 % | DIASTOLIC BLOOD PRESSURE: 78 MMHG | RESPIRATION RATE: 18 BRPM | SYSTOLIC BLOOD PRESSURE: 150 MMHG | TEMPERATURE: 97 F

## 2023-03-26 NOTE — HOME HEALTH
treatments/interventions/modalities of teaching disease and medication management. Discharge planning discussed with patient and caregiver. Discharge planning as follows: Pt/CG will be able to manage disease and medication independently, and health condition stable Pt/Caregiver did verbalize understanding of discharge planning. Next MD appointment with Dr. Angela Vyas 3/29 Patient/caregiver encouraged/instructed to keep appointment as lack of follow through with physician appointment could result   discontinuation of the service. Discharge planning discussed with patient and caregiver. Discharge planning as follows: Pt/CG will be able to manage disease and medication independently, R carotid surgical wound completely healed and health condition stable Pt/Caregiver did verbalize understanding of discharge planning.              COVID - 23 Screening completed before visit:      Denies and no family member  has any of these S/S:  Fever, dry cough, sore throat diarrhea, body aches, chills, not feeling well and loss of taste

## 2023-03-27 ASSESSMENT — ENCOUNTER SYMPTOMS: DYSPNEA ACTIVITY LEVEL: AFTER AMBULATING 10 - 20 FT

## 2023-03-28 ENCOUNTER — HOME CARE VISIT (OUTPATIENT)
Age: 58
End: 2023-03-28
Payer: MEDICAID

## 2023-03-28 VITALS
RESPIRATION RATE: 18 BRPM | HEART RATE: 83 BPM | DIASTOLIC BLOOD PRESSURE: 88 MMHG | TEMPERATURE: 97.3 F | OXYGEN SATURATION: 100 % | SYSTOLIC BLOOD PRESSURE: 142 MMHG

## 2023-03-28 PROCEDURE — G0300 HHS/HOSPICE OF LPN EA 15 MIN: HCPCS

## 2023-03-29 ENCOUNTER — HOME CARE VISIT (OUTPATIENT)
Age: 58
End: 2023-03-29
Payer: MEDICAID

## 2023-03-29 NOTE — HOME HEALTH
Skilled reason for visit:SN assessment and wound care     Caregiver involvement: Pt resides alone in her apt, amb w/o use of device and is independent with ADL's, medication, appts and friend assts with transportation and is available 24/7 to asst as needed. Home health supplies by type and quantity ordered/delivered this visit include: N/A    Medications reviewed and all medications are available in the home this visit. The following education was provided regarding medications NA.     MD notified of any discrepancies/look a-like medications/medication interactions: NA    Medications are effective at this time. Patient education provided this visit:    - Instructed pt to promptly report any redness, swelling, warmth, fever, chills, increased heart/respiratory rate, uncontrolled pain/tenderness, drainage: green/yellow/brown, or odor to MD immediately. No s/s of infection noted this visit no odor or foul drainage to surgical incision. Wound care provided to neck incision, pt has it open to air    -Instructed patient to notify home health or physician for the following wound healing complications: increased drainage, pus or a foul odor coming from the wound, fever, muscle, joint, or body aches, sweating, increased swelling, redness or red streaks surrounding the wound; dramatic change in color or size of wound; stitches coming apart or wound reopening; increase in pain at wound site in spite of interventions. - Instructed patient on importance of adequate nutrition and hydration for wound healing. Healthy foods give your body the nutrients it needs to heal wounds. Protein foods like meat, fish, nuts, and soy products are important to wound healing. In addition to protein, calories, vitamin C, and zinc help wounds heal. Liquids prevent dehydration that can decrease the blood supply to wounds. Always follow physician recommended diet in regards to patient condition.  Instruct on other factors that affect

## 2023-03-31 ENCOUNTER — HOME CARE VISIT (OUTPATIENT)
Age: 58
End: 2023-03-31
Payer: MEDICAID

## 2023-03-31 PROCEDURE — G0300 HHS/HOSPICE OF LPN EA 15 MIN: HCPCS

## 2023-04-01 VITALS
SYSTOLIC BLOOD PRESSURE: 128 MMHG | RESPIRATION RATE: 20 BRPM | HEART RATE: 78 BPM | OXYGEN SATURATION: 99 % | DIASTOLIC BLOOD PRESSURE: 75 MMHG | TEMPERATURE: 97.8 F

## 2023-04-04 ENCOUNTER — HOME CARE VISIT (OUTPATIENT)
Age: 58
End: 2023-04-04
Payer: MEDICAID

## 2023-04-04 VITALS
OXYGEN SATURATION: 97 % | TEMPERATURE: 98.2 F | DIASTOLIC BLOOD PRESSURE: 77 MMHG | HEART RATE: 83 BPM | SYSTOLIC BLOOD PRESSURE: 130 MMHG | RESPIRATION RATE: 20 BRPM

## 2023-04-04 PROCEDURE — G0300 HHS/HOSPICE OF LPN EA 15 MIN: HCPCS

## 2023-04-05 NOTE — HOME HEALTH
Skilled reason for visit: right carotid incision  Caregiver involvement: family as needed. Medications reviewed and all medications are available in the home this visit. The following education was provided regarding medications, medication interactions, and look alike medications (specify): no med changes noted or reported. Medications  are effective at this time. Home health supplies by type and quantity ordered/delivered this visit include: no med changes noted or reported  Patient education provided this visit:SN advised patient to take temperature once a day before bedtime, check for bleeding, pus, hardness, swelling, odor and any color change. If any of these are present, please let your nurse or doctor know as soon as possible. Patient verbalized understanding of instructions given. Progress toward goals: .pt progressing toward improving health with taking all medications as prescribed and keeping all medical appts. pt verbalizes understanding of education provided      Home exercise program: SN instructed patient on nutrients required for wound healing. To promote wound healing with good nutrition, plan healthy, balanced meals and snacks that include the right amount of foods from 5 food groups: protein, fruits, vegetables, dairy and grains. Fats and oils should be used sparingly. Choose vegetables and fruits rich in vitamin c, such as strawberries or spinach. For adequate zinc, choose whole grains and consume protein, such as eggs, meat, dairy or seafood.     Continued need for the following skills: SN  Patient and/or caregiver notified and agrees to changes in the Plan of Care n/a  Patient will be discharged once education and wounds if applicable has been completed, patient is medically stable, and all goals met

## 2023-04-05 NOTE — HOME HEALTH
like meat, fish, nuts, and soy products are important to wound healing. In addition to protein, calories, vitamin C, and zinc help wounds heal. Liquids prevent dehydration that can decrease the blood supply to wounds. Always follow physician recommended diet in regards to patient condition. Instruct on other factors that affect wound healing such as: maintaining general hygiene, not smoking or using tobacco products, offloading pressure and pressure relieving devices, and management of blood sugars.    -Reviewed that hypertension is high blood pressure. Blood pressure is the force of blood moving against the walls of the arteries. The blood pressure reading is reported in 2 numbers. The top number, called systole is the pressure inside the arteries when the heart is nuzhat, and the bottom number called the diastole, is the pressure inside the arteries when the heart is relaxed. HTN causes the blood pressure to get so high that the heart has to work harder than normal. This can damage the heart. The cause of hypertension may not be known. This is called essential or primary hypertension. Hypertension caused by another medical condition, such as kidney disease, is called secondary hypertension. There may be no signs and symptoms of hypertension or may include headache, blurred vision, chest pain, nose bleeds, dizziness, weakness, or trouble breathing. Hypertension is diagnosed after taking blood pressure readings at several doctor visits. If left untreated, hypertension increases the risk of heart attack, stroke, and kidney disease. Pt acknowledged understanding of all education provided. SN reviewed Pts follow up MD appts.     Agency Progress toward goals: Progressing toward established interventions     Patient's Progress towards personal goals: when patient reaches goals and medication is managed, disease processes are understood patient agrees and understand that discharge will take place

## 2023-04-22 ENCOUNTER — HOME CARE VISIT (OUTPATIENT)
Age: 58
End: 2023-04-22
Payer: MEDICAID

## 2023-04-23 VITALS
TEMPERATURE: 98.2 F | RESPIRATION RATE: 10 BRPM | OXYGEN SATURATION: 97 % | SYSTOLIC BLOOD PRESSURE: 130 MMHG | DIASTOLIC BLOOD PRESSURE: 77 MMHG | HEART RATE: 83 BPM

## 2023-04-23 ASSESSMENT — ENCOUNTER SYMPTOMS: HEMOPTYSIS: 0

## 2023-04-24 ENCOUNTER — OFFICE VISIT (OUTPATIENT)
Age: 58
End: 2023-04-24
Payer: MEDICAID

## 2023-04-24 VITALS
WEIGHT: 207 LBS | RESPIRATION RATE: 18 BRPM | HEART RATE: 86 BPM | DIASTOLIC BLOOD PRESSURE: 60 MMHG | OXYGEN SATURATION: 97 % | HEIGHT: 67 IN | SYSTOLIC BLOOD PRESSURE: 126 MMHG | BODY MASS INDEX: 32.49 KG/M2

## 2023-04-24 DIAGNOSIS — Z98.890 STATUS POST CAROTID ENDARTERECTOMY: ICD-10-CM

## 2023-04-24 DIAGNOSIS — R20.2 PARESTHESIAS: ICD-10-CM

## 2023-04-24 DIAGNOSIS — I63.231 ACUTE ISCHEMIC RIGHT INTERNAL CAROTID ARTERY (ICA) STROKE (HCC): ICD-10-CM

## 2023-04-24 PROCEDURE — 99203 OFFICE O/P NEW LOW 30 MIN: CPT | Performed by: NURSE PRACTITIONER

## 2023-04-24 PROCEDURE — 3074F SYST BP LT 130 MM HG: CPT | Performed by: NURSE PRACTITIONER

## 2023-04-24 PROCEDURE — 3078F DIAST BP <80 MM HG: CPT | Performed by: NURSE PRACTITIONER

## 2023-04-24 NOTE — PROGRESS NOTES
head and neck reported category 3 high-grade stenosis right carotid artery initial wet reading stated approximately 70% however with reconstruction images concern for 85 to 90% stenosis. Category 2 changes left carotid artery. She underwent right carotid endarterectomy with vascular surgery. In the hospital, she was also treated for UTI. The vascular surgery note indicates being discharged on aspirin and Plavix. She was discharged on aspirin 81 mg and atorvastatin 80 mg, and Eliquis was stopped. She was continued on gabapentin. She presents today in follow-up. Reports doing well. Reports she feels better, but still has feet numbness. That started a few months prior to coming in. Some pain, in the toes, can't feel them but can move them. She quit smoking, smoked for a while, about 3-4 cigarettes per day. Was not taking aspirin prior to the stroke. Healing from carotid endarterectomy. Swallowing ok. Still reports symptoms of UTI- burning with urination. Sees [facility] for HIV, on Triumeq. Taking aspirin 81 mg. Lipitor 80 mg on list but she is not sure and does not have her list here. Taking gabapentin 300 mg, she believes TID or QID, started in the hospital.  Per records, gabapentin 300 mg TID was started in the hospital in December and continued on most recent hospitlaization. Had CUS on 4/12/2023. Per PA note, continues asa and statin medications. She is going to follow up there in 3 months with a carotid duplex. +toes pain burning, stinging. Sed rate mildly elevated (43) in the hospital.  Labs from the hospital 3/18/2023: total cholesterol 180, LDL-C 118.6. Hemoglobin A1c 5.8. CUS 4/12/2023:  Interpretation Summary         Status post right carotid endartectomy with mild <50% plaquing and no evidence of significant stenosis. There is a small hematoma above the bulb/internal carotid artery.     Moderate (50-69%) stenosis in the left internal carotid artery by velocity critera, by ratio

## 2023-05-08 ENCOUNTER — HOSPITAL ENCOUNTER (OUTPATIENT)
Facility: HOSPITAL | Age: 58
Discharge: HOME | End: 2023-05-11

## 2023-05-08 LAB — LABCORP SPECIMEN COLLECTION: NORMAL

## 2023-05-08 PROCEDURE — 99001 SPECIMEN HANDLING PT-LAB: CPT

## 2023-05-09 LAB
ERYTHROCYTE [SEDIMENTATION RATE] IN BLOOD BY WESTERGREN METHOD: 48 MM/HR (ref 0–40)
FOLATE SERPL-MCNC: 3.3 NG/ML
SPECIMEN STATUS REPORT: NORMAL
SPECIMEN STATUS REPORT: NORMAL
T4 FREE SERPL-MCNC: 0.97 NG/DL (ref 0.82–1.77)
TSH SERPL DL<=0.005 MIU/L-ACNC: 1.24 UIU/ML (ref 0.45–4.5)
VIT B12 SERPL-MCNC: 560 PG/ML (ref 232–1245)

## 2023-05-10 LAB
ALBUMIN SERPL ELPH-MCNC: 3.8 G/DL (ref 2.9–4.4)
ALBUMIN/GLOB SERPL: 1 {RATIO} (ref 0.7–1.7)
ALPHA1 GLOB SERPL ELPH-MCNC: 0.3 G/DL (ref 0–0.4)
ALPHA2 GLOB SERPL ELPH-MCNC: 0.8 G/DL (ref 0.4–1)
B-GLOBULIN SERPL ELPH-MCNC: 1.2 G/DL (ref 0.7–1.3)
CENTROMERE B AB SER-ACNC: <0.2 AI (ref 0–0.9)
CHROMATIN AB SERPL-ACNC: <0.2 AI (ref 0–0.9)
DSDNA AB SER-ACNC: 1 IU/ML (ref 0–9)
ENA JO1 AB SER-ACNC: <0.2 AI (ref 0–0.9)
ENA RNP AB SER-ACNC: <0.2 AI (ref 0–0.9)
ENA SCL70 AB SER-ACNC: <0.2 AI (ref 0–0.9)
ENA SM AB SER-ACNC: <0.2 AI (ref 0–0.9)
ENA SM+RNP AB SER-ACNC: <0.2 AI (ref 0–0.9)
ENA SS-A AB SER-ACNC: 0.2 AI (ref 0–0.9)
ENA SS-B AB SER-ACNC: <0.2 AI (ref 0–0.9)
GAMMA GLOB SERPL ELPH-MCNC: 1.8 G/DL (ref 0.4–1.8)
GLOBULIN SER-MCNC: 4.1 G/DL (ref 2.2–3.9)
IGA SERPL-MCNC: 497 MG/DL (ref 87–352)
IGG SERPL-MCNC: 1513 MG/DL (ref 586–1602)
IGM SERPL-MCNC: 112 MG/DL (ref 26–217)
INTERPRETATION SERPL IEP-IMP: ABNORMAL
LABORATORY COMMENT REPORT: ABNORMAL
Lab: NORMAL
M PROTEIN SERPL ELPH-MCNC: 0.4 G/DL
PROT SERPL-MCNC: 7.9 G/DL (ref 6–8.5)
RIBOSOMAL P AB SER-ACNC: <0.2 AI (ref 0–0.9)

## 2023-05-11 ENCOUNTER — TELEPHONE (OUTPATIENT)
Age: 58
End: 2023-05-11

## 2023-05-11 DIAGNOSIS — R20.2 PARESTHESIAS: ICD-10-CM

## 2023-05-11 DIAGNOSIS — I63.231 ACUTE ISCHEMIC RIGHT INTERNAL CAROTID ARTERY (ICA) STROKE (HCC): ICD-10-CM

## 2023-05-11 DIAGNOSIS — R77.8 ABNORMAL SERUM PROTEIN ELECTROPHORESIS: Primary | ICD-10-CM

## 2023-05-11 RX ORDER — GABAPENTIN 400 MG/1
400 CAPSULE ORAL 3 TIMES DAILY
Qty: 90 CAPSULE | Refills: 2 | Status: SHIPPED | OUTPATIENT
Start: 2023-05-11 | End: 2023-08-11

## 2023-05-11 RX ORDER — ASPIRIN 325 MG
325 TABLET ORAL DAILY
Qty: 30 TABLET | Refills: 3 | Status: SHIPPED | OUTPATIENT
Start: 2023-05-11

## 2023-05-15 ENCOUNTER — TELEPHONE (OUTPATIENT)
Age: 58
End: 2023-05-15

## 2023-08-03 ENCOUNTER — OFFICE VISIT (OUTPATIENT)
Age: 58
End: 2023-08-03
Payer: MEDICAID

## 2023-08-03 VITALS
OXYGEN SATURATION: 98 % | HEART RATE: 87 BPM | SYSTOLIC BLOOD PRESSURE: 132 MMHG | BODY MASS INDEX: 33.27 KG/M2 | DIASTOLIC BLOOD PRESSURE: 82 MMHG | WEIGHT: 207.01 LBS | HEIGHT: 66 IN

## 2023-08-03 DIAGNOSIS — I65.23 CAROTID STENOSIS, BILATERAL: Primary | ICD-10-CM

## 2023-08-03 PROCEDURE — 3079F DIAST BP 80-89 MM HG: CPT | Performed by: STUDENT IN AN ORGANIZED HEALTH CARE EDUCATION/TRAINING PROGRAM

## 2023-08-03 PROCEDURE — 3075F SYST BP GE 130 - 139MM HG: CPT | Performed by: STUDENT IN AN ORGANIZED HEALTH CARE EDUCATION/TRAINING PROGRAM

## 2023-08-03 PROCEDURE — 99214 OFFICE O/P EST MOD 30 MIN: CPT | Performed by: STUDENT IN AN ORGANIZED HEALTH CARE EDUCATION/TRAINING PROGRAM

## 2023-08-03 ASSESSMENT — PATIENT HEALTH QUESTIONNAIRE - PHQ9
SUM OF ALL RESPONSES TO PHQ QUESTIONS 1-9: 0
SUM OF ALL RESPONSES TO PHQ QUESTIONS 1-9: 0
1. LITTLE INTEREST OR PLEASURE IN DOING THINGS: 0
SUM OF ALL RESPONSES TO PHQ QUESTIONS 1-9: 0
2. FEELING DOWN, DEPRESSED OR HOPELESS: 0
SUM OF ALL RESPONSES TO PHQ QUESTIONS 1-9: 0
SUM OF ALL RESPONSES TO PHQ9 QUESTIONS 1 & 2: 0

## 2023-08-03 NOTE — PROGRESS NOTES
1. Have you been to the ER, urgent care clinic since your last visit? No      Hospitalized since your last visit? No     2. Have you seen or consulted any other health care providers outside of the [address] [place] [address] since your last visit? Include any pap smears or colon screening.   No

## 2023-08-10 ENCOUNTER — TRANSCRIBE ORDERS (OUTPATIENT)
Facility: HOSPITAL | Age: 58
End: 2023-08-10

## 2023-08-10 DIAGNOSIS — Z12.31 VISIT FOR SCREENING MAMMOGRAM: Primary | ICD-10-CM

## 2023-08-31 ENCOUNTER — HOSPITAL ENCOUNTER (OUTPATIENT)
Dept: WOMENS IMAGING | Facility: HOSPITAL | Age: 58
Discharge: HOME | End: 2023-08-31
Payer: MEDICAID

## 2023-08-31 DIAGNOSIS — Z12.31 VISIT FOR SCREENING MAMMOGRAM: ICD-10-CM

## 2023-08-31 PROCEDURE — 77067 SCR MAMMO BI INCL CAD: CPT

## 2023-11-28 ENCOUNTER — OFFICE VISIT (OUTPATIENT)
Age: 58
End: 2023-11-28
Payer: MEDICAID

## 2023-11-28 VITALS
HEIGHT: 66 IN | BODY MASS INDEX: 31.5 KG/M2 | RESPIRATION RATE: 18 BRPM | HEART RATE: 86 BPM | OXYGEN SATURATION: 98 % | SYSTOLIC BLOOD PRESSURE: 110 MMHG | WEIGHT: 196 LBS | DIASTOLIC BLOOD PRESSURE: 60 MMHG

## 2023-11-28 DIAGNOSIS — R20.2 PARESTHESIAS: ICD-10-CM

## 2023-11-28 DIAGNOSIS — I65.23 BILATERAL CAROTID ARTERY STENOSIS: ICD-10-CM

## 2023-11-28 DIAGNOSIS — R77.8 ABNORMAL SERUM PROTEIN ELECTROPHORESIS: ICD-10-CM

## 2023-11-28 DIAGNOSIS — Z86.73 HISTORY OF STROKE: ICD-10-CM

## 2023-11-28 DIAGNOSIS — Z98.890 STATUS POST CAROTID ENDARTERECTOMY: ICD-10-CM

## 2023-11-28 DIAGNOSIS — R25.2 MUSCLE CRAMPS: ICD-10-CM

## 2023-11-28 PROCEDURE — 3078F DIAST BP <80 MM HG: CPT | Performed by: NURSE PRACTITIONER

## 2023-11-28 PROCEDURE — 3074F SYST BP LT 130 MM HG: CPT | Performed by: NURSE PRACTITIONER

## 2023-11-28 PROCEDURE — 99213 OFFICE O/P EST LOW 20 MIN: CPT | Performed by: NURSE PRACTITIONER

## 2023-11-28 RX ORDER — TIZANIDINE 2 MG/1
2 TABLET ORAL 2 TIMES DAILY PRN
Qty: 60 TABLET | Refills: 5 | Status: SHIPPED | OUTPATIENT
Start: 2023-11-28

## 2023-11-28 RX ORDER — GABAPENTIN 400 MG/1
400 CAPSULE ORAL 3 TIMES DAILY
Qty: 90 CAPSULE | Refills: 5 | Status: SHIPPED | OUTPATIENT
Start: 2023-11-28 | End: 2024-05-26

## 2023-11-28 RX ORDER — GABAPENTIN 100 MG/1
100 CAPSULE ORAL 3 TIMES DAILY
Qty: 90 CAPSULE | Refills: 5 | Status: SHIPPED | OUTPATIENT
Start: 2023-11-28 | End: 2024-05-26

## 2023-11-28 NOTE — PATIENT INSTRUCTIONS
Patient instructions:  -gabapentin- adding 100 mg capsules for a total of 500 mg three times a day  -tizanidine (muscle relaxer) twice daily as needed  -talk to PCP next week about hip pain  -you have the EMG/nerve conduction study scheduled her on Bhavesh. 10  -hematologist evaluation on Jan. 12th

## 2023-11-28 NOTE — PROGRESS NOTES
mg pill for a total of 500 mg three times daily. Intended supply: 90 days Max Daily Amount: 300 mg    Status post carotid endarterectomy    Abnormal serum protein electrophoresis    Bilateral carotid artery stenosis    Muscle cramps  -     tiZANidine (ZANAFLEX) 2 MG tablet; Take 1 tablet by mouth 2 times daily as needed (muscle cramping)      Signed By: VERITO Diaz NP        PLEASE NOTE:   Portions of this document may have been produced using voice recognition software. Unrecognized errors in transcription may be present.

## 2024-02-02 NOTE — PROGRESS NOTES
Called patient to check on arrival time, patient asked to reschedule, transferred call to central scheduling

## 2024-02-08 ENCOUNTER — OFFICE VISIT (OUTPATIENT)
Age: 59
End: 2024-02-08
Payer: MEDICAID

## 2024-02-08 VITALS
SYSTOLIC BLOOD PRESSURE: 125 MMHG | OXYGEN SATURATION: 97 % | HEIGHT: 66 IN | BODY MASS INDEX: 31.5 KG/M2 | HEART RATE: 81 BPM | DIASTOLIC BLOOD PRESSURE: 80 MMHG | WEIGHT: 196 LBS

## 2024-02-08 DIAGNOSIS — I65.23 CAROTID STENOSIS, BILATERAL: Primary | ICD-10-CM

## 2024-02-08 PROCEDURE — 3074F SYST BP LT 130 MM HG: CPT | Performed by: STUDENT IN AN ORGANIZED HEALTH CARE EDUCATION/TRAINING PROGRAM

## 2024-02-08 PROCEDURE — 99215 OFFICE O/P EST HI 40 MIN: CPT | Performed by: STUDENT IN AN ORGANIZED HEALTH CARE EDUCATION/TRAINING PROGRAM

## 2024-02-08 PROCEDURE — 3079F DIAST BP 80-89 MM HG: CPT | Performed by: STUDENT IN AN ORGANIZED HEALTH CARE EDUCATION/TRAINING PROGRAM

## 2024-02-08 NOTE — PROGRESS NOTES
Total Encounter time: 40 mins      History and Physical    2/8/24  Patient here for follow up s/p right CEA. Doing well and no complications from the surgery. Surgical site has healed well and no evidence of neurological symptoms. Patient continues to smoke and had a saavedra discussion today about cessation. Carotid duplex performed showed patent CEA without evidence of restenosis or hyperplasia.       Patent right CEA with Mild (<50%) stenosis in the right internal carotid artery    Moderate (50-69%) stenosis in the left internal carotid artery, upper limits of range with a CCA/ICA ratio of 3.9.    Elevated velocities in the origin of the left ECA suggestive of stenosis.    Elevated velocities in the proximal left SCA suggestive of mild to moderate stenosis with turbulence.    Patent vertebral arteries with antegrade flow bilaterally.    Patent subclavian arteries with multiphasic flow bilaterally.    No significant change from prior exam.    Given the smoking history, I recommend RTC in 6 months with repeat carotid duplex  Patient has had leg numbness on the left since the stroke, improving.     Giselle Humphreys MD      8/3/23  Vianca LORY Alonzo is a 57 y.o. female s/p right carotid endarterectomy for symptomatic right carotid stenosis. Patient symptoms of left arm and leg weakness resolved within the hospital stay. Right neck incision has healed well. No new stroke like symptoms. Carotid duplex done today shows patent endarterectomy without concern for stenosis.        Patent right carotid endarterectomy with mild <50% plaquing and no evidence of a hemodynamically significant stenosis. Heterogeneous plaque in the right internal carotid artery.    Moderate (50-69%) stenosis approaching severe in the left internal carotid artery with a ICA/CCA ratio of 3.8.  Heterogeneous plaque in the left internal carotid artery.    Elevated velocities in the left ECA suggestive of stenosis.    Normal antegrade flow in the

## 2024-03-01 ENCOUNTER — HOSPITAL ENCOUNTER (OUTPATIENT)
Facility: HOSPITAL | Age: 59
End: 2024-03-01
Attending: INTERNAL MEDICINE
Payer: MEDICAID

## 2024-03-01 VITALS
BODY MASS INDEX: 31.34 KG/M2 | DIASTOLIC BLOOD PRESSURE: 74 MMHG | HEART RATE: 81 BPM | RESPIRATION RATE: 17 BRPM | OXYGEN SATURATION: 95 % | SYSTOLIC BLOOD PRESSURE: 153 MMHG | HEIGHT: 66 IN | WEIGHT: 195 LBS

## 2024-03-01 DIAGNOSIS — D47.2 MGUS (MONOCLONAL GAMMOPATHY OF UNKNOWN SIGNIFICANCE): ICD-10-CM

## 2024-03-01 LAB
ANION GAP SERPL CALC-SCNC: 6 MMOL/L (ref 3–18)
APTT PPP: 25.8 SEC (ref 23–36.4)
BASOPHILS # BLD: 0 K/UL (ref 0–0.1)
BASOPHILS NFR BLD: 1 % (ref 0–2)
BUN SERPL-MCNC: 15 MG/DL (ref 7–18)
BUN/CREAT SERPL: 16 (ref 12–20)
CALCIUM SERPL-MCNC: 9 MG/DL (ref 8.5–10.1)
CHLORIDE SERPL-SCNC: 107 MMOL/L (ref 100–111)
CO2 SERPL-SCNC: 25 MMOL/L (ref 21–32)
CREAT SERPL-MCNC: 0.95 MG/DL (ref 0.6–1.3)
DIFFERENTIAL METHOD BLD: ABNORMAL
EOSINOPHIL # BLD: 0.1 K/UL (ref 0–0.4)
EOSINOPHIL NFR BLD: 2 % (ref 0–5)
ERYTHROCYTE [DISTWIDTH] IN BLOOD BY AUTOMATED COUNT: 14.6 % (ref 11.6–14.5)
GLUCOSE SERPL-MCNC: 108 MG/DL (ref 74–99)
HCT VFR BLD AUTO: 36.9 % (ref 35–45)
HGB BLD-MCNC: 12.2 G/DL (ref 12–16)
IMM GRANULOCYTES # BLD AUTO: 0 K/UL (ref 0–0.04)
IMM GRANULOCYTES NFR BLD AUTO: 0 % (ref 0–0.5)
INR PPP: 1 (ref 0.9–1.1)
LYMPHOCYTES # BLD: 1.2 K/UL (ref 0.9–3.6)
LYMPHOCYTES NFR BLD: 21 % (ref 21–52)
MCH RBC QN AUTO: 28 PG (ref 24–34)
MCHC RBC AUTO-ENTMCNC: 33.1 G/DL (ref 31–37)
MCV RBC AUTO: 84.6 FL (ref 78–100)
MONOCYTES # BLD: 0.3 K/UL (ref 0.05–1.2)
MONOCYTES NFR BLD: 5 % (ref 3–10)
NEUTS SEG # BLD: 3.9 K/UL (ref 1.8–8)
NEUTS SEG NFR BLD: 71 % (ref 40–73)
NRBC # BLD: 0 K/UL (ref 0–0.01)
NRBC BLD-RTO: 0 PER 100 WBC
PLATELET # BLD AUTO: 203 K/UL (ref 135–420)
PMV BLD AUTO: 9.1 FL (ref 9.2–11.8)
POTASSIUM SERPL-SCNC: 4 MMOL/L (ref 3.5–5.5)
PROTHROMBIN TIME: 13.6 SEC (ref 11.9–14.7)
RBC # BLD AUTO: 4.36 M/UL (ref 4.2–5.3)
SODIUM SERPL-SCNC: 138 MMOL/L (ref 136–145)
WBC # BLD AUTO: 5.5 K/UL (ref 4.6–13.2)

## 2024-03-01 PROCEDURE — 85730 THROMBOPLASTIN TIME PARTIAL: CPT

## 2024-03-01 PROCEDURE — 85025 COMPLETE CBC W/AUTO DIFF WBC: CPT

## 2024-03-01 PROCEDURE — 88374 M/PHMTRC ALYS ISHQUANT/SEMIQ: CPT

## 2024-03-01 PROCEDURE — 88311 DECALCIFY TISSUE: CPT

## 2024-03-01 PROCEDURE — 2500000003 HC RX 250 WO HCPCS: Performed by: RADIOLOGY

## 2024-03-01 PROCEDURE — 88264 CHROMOSOME ANALYSIS 20-25: CPT

## 2024-03-01 PROCEDURE — 88237 TISSUE CULTURE BONE MARROW: CPT

## 2024-03-01 PROCEDURE — 88185 FLOWCYTOMETRY/TC ADD-ON: CPT

## 2024-03-01 PROCEDURE — 6360000002 HC RX W HCPCS: Performed by: RADIOLOGY

## 2024-03-01 PROCEDURE — 85610 PROTHROMBIN TIME: CPT

## 2024-03-01 PROCEDURE — 88305 TISSUE EXAM BY PATHOLOGIST: CPT

## 2024-03-01 PROCEDURE — 88342 IMHCHEM/IMCYTCHM 1ST ANTB: CPT

## 2024-03-01 PROCEDURE — 88184 FLOWCYTOMETRY/ TC 1 MARKER: CPT

## 2024-03-01 PROCEDURE — 88313 SPECIAL STAINS GROUP 2: CPT

## 2024-03-01 PROCEDURE — 38222 DX BONE MARROW BX & ASPIR: CPT

## 2024-03-01 PROCEDURE — 80048 BASIC METABOLIC PNL TOTAL CA: CPT

## 2024-03-01 RX ORDER — LIDOCAINE HYDROCHLORIDE 10 MG/ML
INJECTION, SOLUTION EPIDURAL; INFILTRATION; INTRACAUDAL; PERINEURAL PRN
Status: COMPLETED | OUTPATIENT
Start: 2024-03-01 | End: 2024-03-01

## 2024-03-01 RX ORDER — SODIUM CHLORIDE 9 MG/ML
INJECTION, SOLUTION INTRAVENOUS CONTINUOUS
Status: DISCONTINUED | OUTPATIENT
Start: 2024-03-01 | End: 2024-03-05 | Stop reason: HOSPADM

## 2024-03-01 RX ORDER — FENTANYL CITRATE 50 UG/ML
INJECTION, SOLUTION INTRAMUSCULAR; INTRAVENOUS PRN
Status: COMPLETED | OUTPATIENT
Start: 2024-03-01 | End: 2024-03-01

## 2024-03-01 RX ORDER — MIDAZOLAM HYDROCHLORIDE 2 MG/2ML
INJECTION, SOLUTION INTRAMUSCULAR; INTRAVENOUS PRN
Status: COMPLETED | OUTPATIENT
Start: 2024-03-01 | End: 2024-03-01

## 2024-03-01 RX ADMIN — FENTANYL CITRATE 50 MCG: 50 INJECTION INTRAMUSCULAR; INTRAVENOUS at 11:10

## 2024-03-01 RX ADMIN — FENTANYL CITRATE 50 MCG: 50 INJECTION INTRAMUSCULAR; INTRAVENOUS at 11:05

## 2024-03-01 RX ADMIN — LIDOCAINE HYDROCHLORIDE 5 ML: 10 INJECTION, SOLUTION EPIDURAL; INFILTRATION; INTRACAUDAL; PERINEURAL at 11:05

## 2024-03-01 RX ADMIN — MIDAZOLAM HYDROCHLORIDE 1 MG: 1 INJECTION, SOLUTION INTRAMUSCULAR; INTRAVENOUS at 11:10

## 2024-03-01 RX ADMIN — MIDAZOLAM HYDROCHLORIDE 1 MG: 1 INJECTION, SOLUTION INTRAMUSCULAR; INTRAVENOUS at 11:05

## 2024-03-01 NOTE — PROGRESS NOTES
TRANSFER - OUT REPORT:    Verbal report given to Columba VELEZ on Vianca Alonzo  being transferred to [facility] for routine post-op       Report consisted of patient's Situation, Background, Assessment and   Recommendations(SBAR).     Information from the following report(s) Nurse Handoff Report was reviewed with the receiving nurse.           Lines:   Peripheral IV 03/01/24 Left;Proximal;Anterior Forearm (Active)        Opportunity for questions and clarification was provided.      Patient transported with:  Registered Nurse

## 2024-03-01 NOTE — PROGRESS NOTES
Cath holding summary     Patient escorted to cath holding from waiting area ambulatory, alert and oriented x 4, voicing no complaints.  Changed into gown and placed on monitor.   NPO since MN.  Lab results, med rec and H&P reviewed on chart.  PIV x 1 inserted without difficulty.     1130  TRANSFER - IN REPORT:  Verbal report received from Antonia (name) on Vianca Alonzo  being received from IR Lab (unit) for ordered procedure   Report consisted of patient’s Situation, Background, Assessment and   Recommendations(SBAR).   Information from the following report(s) SBAR, Procedure Summary, Intake/Output, and MAR was reviewed with the receiving nurse.  Opportunity for questions and clarification was provided.    Assessment completed upon patient’s arrival to unit and care assumed.   Site: L lower back/buttocks with quikclot/tegaderm in place. CDI and denies pain. A/Ox4.     1245  AVS Discharge instructions reviewed with patient and copy given to patient.  All questions answered.  Patient verbalized understanding to all discharge instructions.  PIV removed. Procedural site within normal limits.  No hematoma or bleeding noted from procedural and PIV site. No pain noted at discharge.  Patient discharged with support person in stable condition.  Escorted out to [employer] transport for transport home.

## 2024-03-01 NOTE — PROCEDURES
RADIOLOGY POST PROCEDURE NOTE     March 1, 2024       11:20 AM     Preoperative Diagnosis:   MGUS    Postoperative Diagnosis:  Same.    : Andrey Flores MD    Assistant:  None.    Type of Anesthesia: moderate sedation    Procedure/Description:  Image-guided BMBX    Findings:   pending.    Estimated blood Loss:  Minimal    Specimen Removed:    hematology    Blood transfusions:  None.    Implants:  None.    Complications: None    Condition: Stable    Blood thinning medications: OK to resume as clinically indicated    Discharge Plan:  discharge home in 1 hour    Andrey Flores MD

## 2024-03-01 NOTE — H&P
Preprocedure Assessment      Today 3/1/2024     Indication/Symptoms:   Vianca Alonzo is a 58 y.o.here for BMBX hx of MGUS    The H & P and/or progress notes and any available imaging were reviewed.  The risks, indications and possible alternatives to the procedure, including doing nothing, were discussed and informed consent was obtained.    Physical Exam:      Heart:   RRR   Lungs:   CTAB, no wheezes, rhonchi or rales.    The patient is an appropriate candidate to undergo the planned procedure and sedation.    ILDEFONSO MORSE PA-C

## 2024-03-01 NOTE — DISCHARGE INSTRUCTIONS

## 2024-05-28 DIAGNOSIS — R20.2 PARESTHESIAS: ICD-10-CM

## 2024-05-29 RX ORDER — GABAPENTIN 100 MG/1
CAPSULE ORAL
Qty: 90 CAPSULE | Refills: 2 | Status: SHIPPED | OUTPATIENT
Start: 2024-05-29 | End: 2024-08-27

## 2024-05-29 RX ORDER — GABAPENTIN 400 MG/1
CAPSULE ORAL
Qty: 90 CAPSULE | Refills: 2 | Status: SHIPPED | OUTPATIENT
Start: 2024-05-29 | End: 2024-08-27

## 2024-06-24 ENCOUNTER — TRANSCRIBE ORDERS (OUTPATIENT)
Facility: HOSPITAL | Age: 59
End: 2024-06-24

## 2024-06-24 DIAGNOSIS — Z12.31 SCREENING MAMMOGRAM FOR HIGH-RISK PATIENT: Primary | ICD-10-CM

## 2024-07-20 ENCOUNTER — APPOINTMENT (OUTPATIENT)
Facility: HOSPITAL | Age: 59
DRG: 182 | End: 2024-07-20
Payer: MEDICAID

## 2024-07-20 ENCOUNTER — HOSPITAL ENCOUNTER (INPATIENT)
Facility: HOSPITAL | Age: 59
LOS: 5 days | Discharge: HOME | DRG: 182 | End: 2024-07-25
Attending: SURGERY | Admitting: SURGERY
Payer: MEDICAID

## 2024-07-20 DIAGNOSIS — I99.8 LIMB ISCHEMIA: ICD-10-CM

## 2024-07-20 DIAGNOSIS — I73.9 PVD (PERIPHERAL VASCULAR DISEASE) (HCC): ICD-10-CM

## 2024-07-20 DIAGNOSIS — I73.9 PERIPHERAL VASCULAR DISEASE (HCC): Primary | ICD-10-CM

## 2024-07-20 DIAGNOSIS — I73.9 PAD (PERIPHERAL ARTERY DISEASE) (HCC): ICD-10-CM

## 2024-07-20 LAB
ANION GAP SERPL CALC-SCNC: 2 MMOL/L (ref 3–18)
BASOPHILS # BLD: 0.1 K/UL (ref 0–0.1)
BASOPHILS NFR BLD: 1 % (ref 0–2)
BUN SERPL-MCNC: 13 MG/DL (ref 7–18)
BUN/CREAT SERPL: 19 (ref 12–20)
CALCIUM SERPL-MCNC: 9.3 MG/DL (ref 8.5–10.1)
CHLORIDE SERPL-SCNC: 111 MMOL/L (ref 100–111)
CO2 SERPL-SCNC: 28 MMOL/L (ref 21–32)
CREAT SERPL-MCNC: 0.7 MG/DL (ref 0.6–1.3)
DIFFERENTIAL METHOD BLD: ABNORMAL
ECHO BSA: 2.03 M2
ECHO BSA: 2.03 M2
EOSINOPHIL # BLD: 0.2 K/UL (ref 0–0.4)
EOSINOPHIL NFR BLD: 4 % (ref 0–5)
ERYTHROCYTE [DISTWIDTH] IN BLOOD BY AUTOMATED COUNT: 14.5 % (ref 11.6–14.5)
GLUCOSE SERPL-MCNC: 89 MG/DL (ref 74–99)
HCG SERPL QL: NEGATIVE
HCT VFR BLD AUTO: 39.3 % (ref 35–45)
HGB BLD-MCNC: 12.4 G/DL (ref 12–16)
IMM GRANULOCYTES # BLD AUTO: 0 K/UL (ref 0–0.04)
IMM GRANULOCYTES NFR BLD AUTO: 0 % (ref 0–0.5)
INR PPP: 1 (ref 0.9–1.1)
LYMPHOCYTES # BLD: 1.6 K/UL (ref 0.9–3.6)
LYMPHOCYTES NFR BLD: 39 % (ref 21–52)
MCH RBC QN AUTO: 27.9 PG (ref 24–34)
MCHC RBC AUTO-ENTMCNC: 31.6 G/DL (ref 31–37)
MCV RBC AUTO: 88.3 FL (ref 78–100)
MONOCYTES # BLD: 0.3 K/UL (ref 0.05–1.2)
MONOCYTES NFR BLD: 7 % (ref 3–10)
NEUTS SEG # BLD: 2 K/UL (ref 1.8–8)
NEUTS SEG NFR BLD: 49 % (ref 40–73)
NRBC # BLD: 0 K/UL (ref 0–0.01)
NRBC BLD-RTO: 0 PER 100 WBC
PLATELET # BLD AUTO: 250 K/UL (ref 135–420)
PMV BLD AUTO: 9.9 FL (ref 9.2–11.8)
POTASSIUM SERPL-SCNC: 4.5 MMOL/L (ref 3.5–5.5)
PROTHROMBIN TIME: 13.3 SEC (ref 11.9–14.9)
RBC # BLD AUTO: 4.45 M/UL (ref 4.2–5.3)
SODIUM SERPL-SCNC: 141 MMOL/L (ref 136–145)
VAS LEFT ABI: 0.54
VAS LEFT DORSALIS PEDIS BP: 68 MMHG
VAS LEFT PTA BP: 92 MMHG
VAS LEFT TBI: 0.54
VAS LEFT TOE PRESSURE: 91 MMHG
VAS RIGHT ABI: 0.31
VAS RIGHT ARM BP: 170 MMHG
VAS RIGHT ATA DIST PSV: 13.7 CM/S
VAS RIGHT ATA MID PSV: 14.1 CM/S
VAS RIGHT ATA PROX PSV: 15.2 CM/S
VAS RIGHT CFA DIST PSV: 66.7 CM/S
VAS RIGHT CFA PROX PSV: 95.8 CM/S
VAS RIGHT CFA VEL RATIO: 1.2
VAS RIGHT DORSALIS PEDIS BP: 52 MMHG
VAS RIGHT EXT ILIAC DIST PSV: 80.8 CM/S
VAS RIGHT EXT ILIAC PROX PSV: 27 CM/S
VAS RIGHT PERONEAL DIST PSV: 4.5 CM/S
VAS RIGHT PERONEAL MID PSV: 7.3 CM/S
VAS RIGHT PERONEAL PROX PSV: 9.8 CM/S
VAS RIGHT PFA PROX PSV: 59.4 CM/S
VAS RIGHT POP A DIST PSV: 39.3 CM/S
VAS RIGHT POP A PROX PSV: 45.7 CM/S
VAS RIGHT POP A PROX VEL RATIO: 2.06
VAS RIGHT PTA BP: 47 MMHG
VAS RIGHT PTA DIST PSV: 21 CM/S
VAS RIGHT PTA MID PSV: 27.5 CM/S
VAS RIGHT PTA PROX PSV: 33 CM/S
VAS RIGHT SFA DIST PSV: 22.2 CM/S
VAS RIGHT SFA DIST VEL RATIO: 1.02
VAS RIGHT SFA MID PSV: 21.8 CM/S
VAS RIGHT SFA MID VEL RATIO: 1.2
VAS RIGHT SFA PROX PSV: 18.6 CM/S
VAS RIGHT SFA PROX VEL RATIO: 0.2
VAS RIGHT TBI: 0.25
VAS RIGHT TOE PRESSURE: 42 MMHG
WBC # BLD AUTO: 4.1 K/UL (ref 4.6–13.2)

## 2024-07-20 PROCEDURE — 6370000000 HC RX 637 (ALT 250 FOR IP)

## 2024-07-20 PROCEDURE — 93971 EXTREMITY STUDY: CPT

## 2024-07-20 PROCEDURE — 93926 LOWER EXTREMITY STUDY: CPT | Performed by: INTERNAL MEDICINE

## 2024-07-20 PROCEDURE — 1100000000 HC RM PRIVATE

## 2024-07-20 PROCEDURE — 2580000003 HC RX 258

## 2024-07-20 PROCEDURE — 80048 BASIC METABOLIC PNL TOTAL CA: CPT

## 2024-07-20 PROCEDURE — 99285 EMERGENCY DEPT VISIT HI MDM: CPT

## 2024-07-20 PROCEDURE — 93971 EXTREMITY STUDY: CPT | Performed by: INTERNAL MEDICINE

## 2024-07-20 PROCEDURE — 84703 CHORIONIC GONADOTROPIN ASSAY: CPT

## 2024-07-20 PROCEDURE — 85025 COMPLETE CBC W/AUTO DIFF WBC: CPT

## 2024-07-20 PROCEDURE — 85610 PROTHROMBIN TIME: CPT

## 2024-07-20 PROCEDURE — 6360000002 HC RX W HCPCS

## 2024-07-20 PROCEDURE — 93926 LOWER EXTREMITY STUDY: CPT

## 2024-07-20 RX ORDER — SODIUM CHLORIDE 9 MG/ML
INJECTION, SOLUTION INTRAVENOUS PRN
Status: DISCONTINUED | OUTPATIENT
Start: 2024-07-20 | End: 2024-07-25 | Stop reason: HOSPADM

## 2024-07-20 RX ORDER — ONDANSETRON 2 MG/ML
4 INJECTION INTRAMUSCULAR; INTRAVENOUS EVERY 6 HOURS PRN
Status: DISCONTINUED | OUTPATIENT
Start: 2024-07-20 | End: 2024-07-25 | Stop reason: HOSPADM

## 2024-07-20 RX ORDER — AMLODIPINE BESYLATE 5 MG/1
5 TABLET ORAL DAILY
Status: DISCONTINUED | OUTPATIENT
Start: 2024-07-20 | End: 2024-07-25 | Stop reason: HOSPADM

## 2024-07-20 RX ORDER — ONDANSETRON 4 MG/1
4 TABLET, ORALLY DISINTEGRATING ORAL EVERY 8 HOURS PRN
Status: DISCONTINUED | OUTPATIENT
Start: 2024-07-20 | End: 2024-07-25 | Stop reason: HOSPADM

## 2024-07-20 RX ORDER — METHOCARBAMOL 500 MG/1
500 TABLET, FILM COATED ORAL 4 TIMES DAILY
Status: DISCONTINUED | OUTPATIENT
Start: 2024-07-20 | End: 2024-07-25 | Stop reason: HOSPADM

## 2024-07-20 RX ORDER — TIZANIDINE 4 MG/1
2 TABLET ORAL 2 TIMES DAILY PRN
Status: DISCONTINUED | OUTPATIENT
Start: 2024-07-20 | End: 2024-07-25 | Stop reason: HOSPADM

## 2024-07-20 RX ORDER — SODIUM CHLORIDE 0.9 % (FLUSH) 0.9 %
5-40 SYRINGE (ML) INJECTION EVERY 12 HOURS SCHEDULED
Status: DISCONTINUED | OUTPATIENT
Start: 2024-07-20 | End: 2024-07-25 | Stop reason: HOSPADM

## 2024-07-20 RX ORDER — ASPIRIN 325 MG
325 TABLET ORAL DAILY
Status: DISCONTINUED | OUTPATIENT
Start: 2024-07-20 | End: 2024-07-23

## 2024-07-20 RX ORDER — ATORVASTATIN CALCIUM 40 MG/1
80 TABLET, FILM COATED ORAL NIGHTLY
Status: DISCONTINUED | OUTPATIENT
Start: 2024-07-20 | End: 2024-07-25 | Stop reason: HOSPADM

## 2024-07-20 RX ORDER — OXYCODONE HYDROCHLORIDE 5 MG/1
5 TABLET ORAL EVERY 4 HOURS PRN
Status: DISCONTINUED | OUTPATIENT
Start: 2024-07-20 | End: 2024-07-25 | Stop reason: HOSPADM

## 2024-07-20 RX ORDER — ENOXAPARIN SODIUM 100 MG/ML
40 INJECTION SUBCUTANEOUS DAILY
Status: DISCONTINUED | OUTPATIENT
Start: 2024-07-20 | End: 2024-07-25 | Stop reason: HOSPADM

## 2024-07-20 RX ORDER — SODIUM CHLORIDE 0.9 % (FLUSH) 0.9 %
5-40 SYRINGE (ML) INJECTION PRN
Status: DISCONTINUED | OUTPATIENT
Start: 2024-07-20 | End: 2024-07-25 | Stop reason: HOSPADM

## 2024-07-20 RX ORDER — DEXTROSE MONOHYDRATE AND SODIUM CHLORIDE 5; .45 G/100ML; G/100ML
INJECTION, SOLUTION INTRAVENOUS CONTINUOUS
Status: DISCONTINUED | OUTPATIENT
Start: 2024-07-20 | End: 2024-07-25 | Stop reason: HOSPADM

## 2024-07-20 RX ORDER — POTASSIUM CHLORIDE 750 MG/1
10 TABLET, EXTENDED RELEASE ORAL DAILY
Status: DISCONTINUED | OUTPATIENT
Start: 2024-07-21 | End: 2024-07-25 | Stop reason: HOSPADM

## 2024-07-20 RX ADMIN — AMLODIPINE BESYLATE 5 MG: 5 TABLET ORAL at 19:02

## 2024-07-20 RX ADMIN — ASPIRIN 325 MG: 325 TABLET ORAL at 19:01

## 2024-07-20 RX ADMIN — ENOXAPARIN SODIUM 40 MG: 100 INJECTION SUBCUTANEOUS at 19:02

## 2024-07-20 RX ADMIN — METHOCARBAMOL 500 MG: 500 TABLET ORAL at 20:53

## 2024-07-20 RX ADMIN — HYDROMORPHONE HYDROCHLORIDE 0.5 MG: 1 INJECTION, SOLUTION INTRAMUSCULAR; INTRAVENOUS; SUBCUTANEOUS at 21:50

## 2024-07-20 RX ADMIN — DEXTROSE AND SODIUM CHLORIDE: 5; 450 INJECTION, SOLUTION INTRAVENOUS at 19:07

## 2024-07-20 RX ADMIN — HYDROMORPHONE HYDROCHLORIDE 0.5 MG: 1 INJECTION, SOLUTION INTRAMUSCULAR; INTRAVENOUS; SUBCUTANEOUS at 19:01

## 2024-07-20 RX ADMIN — ATORVASTATIN CALCIUM 80 MG: 40 TABLET, FILM COATED ORAL at 20:53

## 2024-07-20 NOTE — ED NOTES
TRANSFER - OUT REPORT:    Verbal report given to Brennan VELEZ  on Vianca Alonzo  being transferred to [address] [place] [address] for routine progression of patient care       Report consisted of patient's Situation, Background, Assessment and   Recommendations(SBAR).     Information from the following report(s) Nurse Handoff Report, ED SBAR, Recent Results, and Event Log was reviewed with the receiving nurse.    [place] Fall Assessment:                           Lines:   Peripheral IV 07/20/24 Left Antecubital (Active)        Opportunity for questions and clarification was provided.      Patient transported with:  Tech

## 2024-07-20 NOTE — ED TRIAGE NOTES
Pt came via EMS from home c/o R leg pain and numbness that started an hour ago. Pt stated she also experienced the same symptoms yesterday at 1000H.    Pt has been taking Gabapentin and took 1 tab at 0700H earlier.    Hx of HTN.

## 2024-07-20 NOTE — H&P
Vascular Surgery History & Physical    Subjective:     Vianca Alonzo is a 58 y.o.  female with history of PVD. Presents with short distance claudication and occassional rest pain.  She has toe pain in the are of a callous.  No real tissue loss.  Is not currently with rest pain     Past Medical History:   Diagnosis Date    Hypertension     Tobacco abuse       Past Surgical History:   Procedure Laterality Date    CAROTID ENDARTERECTOMY Right 3/21/2023    RIGHT CAROTID ENDARTERECTOMY WITH ELECTROENCEPHALOGRAPHY MONITORING / PATCH ANGIOPLASTY performed by Giselle Humphreys MD at [facility] CARDIAC SURGERY     Family History   Problem Relation Age of Onset    No Known Problems Sister     Breast Cancer Mother       Social History     Tobacco Use    Smoking status: Former     Current packs/day: 0.25     Types: Cigarettes    Smokeless tobacco: Never   Substance Use Topics    Alcohol use: Yes     Alcohol/week: 14.0 standard drinks of alcohol       Prior to Admission medications    Medication Sig Start Date End Date Taking? Authorizing Provider   gabapentin (NEURONTIN) 400 MG capsule TAKE 1 CAPSULE BY MOUTH DAILY IN THE MORNING AT NOON AND AT BEDTIME (TAKE WITH 100MG PILL FOR TOTAL  MG THREE TIMES A DAY) 5/29/24 8/27/24  Analy Holguin APRN - NP   gabapentin (NEURONTIN) 100 MG capsule TAKE 1 CAP BY MOUTH THREE TIMES A DAY (TAKE WITH 400MG CAP FOR TOTAL OF 500MG TID) 5/29/24 8/27/24  Analy Holguin APRN - NP   tiZANidine (ZANAFLEX) 2 MG tablet Take 1 tablet by mouth 2 times daily as needed (muscle cramping) 11/28/23   Analy Holguin APRN - NP   aspirin 325 MG tablet Take 1 tablet by mouth daily 5/11/23   Analy Holguin APRN - NP   atorvastatin (LIPITOR) 80 MG tablet Take 1 tablet by mouth nightly 3/22/23   Nathan Stone MD   amLODIPine (NORVASC) 5 MG tablet Take 1 tablet by mouth daily 3/22/23   Nathan Stone MD   Abacavir-Dolutegravir-Lamivud (TRIUMEQ) 600- MG

## 2024-07-20 NOTE — PLAN OF CARE
Problem: Discharge Planning  Goal: Discharge to home or other facility with appropriate resources  Outcome: Progressing     Problem: Pain  Goal: Verbalizes/displays adequate comfort level or baseline comfort level  Outcome: Progressing     Problem: ABCDS Injury Assessment  Goal: Absence of physical injury  Outcome: Progressing     Problem: Safety - Adult  Goal: Free from fall injury  Outcome: Progressing     Problem: Risk for Elopement  Goal: Patient will not exit the unit/facility without proper excort  Outcome: Progressing  Flowsheets (Taken 7/20/2024 4840)  Nursing Interventions for Elopement Risk:   Assist with personal care needs such as toileting, eating, dressing, as needed to reduce the risk of wandering   Make sure patient has all necessary personal care items   Reduce environmental triggers

## 2024-07-20 NOTE — ED PROVIDER NOTES
Decision Making   I am the first provider for this patient.    I reviewed the vital signs, available nursing notes, past medical history, past surgical history, family history and social history.      Provider Notes (Medical Decision Making):     Peripheral vascular disease: Right-sided arterial study shows monophasic flow as proximal as the iliac artery, toe brachial index 0.25 is near critical level.  Vascular surgeon was consulted and patient is admitted to their service for additional evaluation and management.      ED Course:   ED Course as of 07/20/24 1833   Sat Jul 20, 2024   1522 Called from vascular tech to discuss ultrasound preliminary findings  Negative for DVT  Right great toe 0.25, (critical is < 0.21)  Mid femoral right nearly occluded, monophasic in iliac artery [AS]   1537 Dr. Richmond Mccormack vascular surgery states he will see the patient if admitted, disposition TBD by myself. Does not require CTA or heparin at this point. [AS]   1548 Dr. Ovalles, Hospitalist, accepts patient for admission [AS]   1612 Vascular Surgeon, Dr. Mccormack will admit patient to his service, Hospitalist aware [AS]      ED Course User Index  [AS] Palmer Drew, SINGH       1. Peripheral vascular disease (HCC)        DISPOSITION Admitted 07/20/2024 04:22:57 PM      Follow-ups:  No follow-up provider specified.    Orders Placed This Encounter   Medications    abacavir-dolutegravir-lamiVUDine (TRIUMEQ) 600- MG TABS 600 mg    DISCONTD: vitamin D (CHOLECALCIFEROL) CAPS 50,000 Units    methocarbamol (ROBAXIN) tablet 500 mg    potassium chloride (KLOR-CON M) extended release tablet 10 mEq    atorvastatin (LIPITOR) tablet 80 mg    amLODIPine (NORVASC) tablet 5 mg    aspirin tablet 325 mg    tiZANidine (ZANAFLEX) tablet 2 mg    sodium chloride flush 0.9 % injection 5-40 mL    sodium chloride flush 0.9 % injection 5-40 mL    0.9 % sodium chloride infusion    OR Linked Order Group     ondansetron (ZOFRAN-ODT) disintegrating tablet 4 mg

## 2024-07-21 ENCOUNTER — APPOINTMENT (OUTPATIENT)
Facility: HOSPITAL | Age: 59
DRG: 182 | End: 2024-07-21
Payer: MEDICAID

## 2024-07-21 LAB
ANION GAP SERPL CALC-SCNC: 7 MMOL/L (ref 3–18)
BUN SERPL-MCNC: 8 MG/DL (ref 7–18)
BUN/CREAT SERPL: 13 (ref 12–20)
CALCIUM SERPL-MCNC: 8.6 MG/DL (ref 8.5–10.1)
CHLORIDE SERPL-SCNC: 109 MMOL/L (ref 100–111)
CO2 SERPL-SCNC: 25 MMOL/L (ref 21–32)
CREAT SERPL-MCNC: 0.61 MG/DL (ref 0.6–1.3)
ERYTHROCYTE [DISTWIDTH] IN BLOOD BY AUTOMATED COUNT: 14.3 % (ref 11.6–14.5)
GLUCOSE SERPL-MCNC: 140 MG/DL (ref 74–99)
HCT VFR BLD AUTO: 35.1 % (ref 35–45)
HGB BLD-MCNC: 11.1 G/DL (ref 12–16)
MCH RBC QN AUTO: 27.5 PG (ref 24–34)
MCHC RBC AUTO-ENTMCNC: 31.6 G/DL (ref 31–37)
MCV RBC AUTO: 87.1 FL (ref 78–100)
NRBC # BLD: 0 K/UL (ref 0–0.01)
NRBC BLD-RTO: 0 PER 100 WBC
PLATELET # BLD AUTO: 215 K/UL (ref 135–420)
PMV BLD AUTO: 10 FL (ref 9.2–11.8)
POTASSIUM SERPL-SCNC: 3.9 MMOL/L (ref 3.5–5.5)
RBC # BLD AUTO: 4.03 M/UL (ref 4.2–5.3)
SODIUM SERPL-SCNC: 141 MMOL/L (ref 136–145)
WBC # BLD AUTO: 4.6 K/UL (ref 4.6–13.2)

## 2024-07-21 PROCEDURE — 2580000003 HC RX 258

## 2024-07-21 PROCEDURE — 75635 CT ANGIO ABDOMINAL ARTERIES: CPT

## 2024-07-21 PROCEDURE — 94761 N-INVAS EAR/PLS OXIMETRY MLT: CPT

## 2024-07-21 PROCEDURE — 6360000004 HC RX CONTRAST MEDICATION

## 2024-07-21 PROCEDURE — 36415 COLL VENOUS BLD VENIPUNCTURE: CPT

## 2024-07-21 PROCEDURE — 1100000000 HC RM PRIVATE

## 2024-07-21 PROCEDURE — 80048 BASIC METABOLIC PNL TOTAL CA: CPT

## 2024-07-21 PROCEDURE — 85027 COMPLETE CBC AUTOMATED: CPT

## 2024-07-21 PROCEDURE — 6360000002 HC RX W HCPCS

## 2024-07-21 PROCEDURE — 6370000000 HC RX 637 (ALT 250 FOR IP)

## 2024-07-21 RX ADMIN — METHOCARBAMOL 500 MG: 500 TABLET ORAL at 14:22

## 2024-07-21 RX ADMIN — SODIUM CHLORIDE, PRESERVATIVE FREE 10 ML: 5 INJECTION INTRAVENOUS at 09:19

## 2024-07-21 RX ADMIN — METHOCARBAMOL 500 MG: 500 TABLET ORAL at 20:18

## 2024-07-21 RX ADMIN — HYDROMORPHONE HYDROCHLORIDE 0.5 MG: 1 INJECTION, SOLUTION INTRAMUSCULAR; INTRAVENOUS; SUBCUTANEOUS at 05:51

## 2024-07-21 RX ADMIN — SODIUM CHLORIDE, PRESERVATIVE FREE 10 ML: 5 INJECTION INTRAVENOUS at 20:20

## 2024-07-21 RX ADMIN — DEXTROSE AND SODIUM CHLORIDE: 5; 450 INJECTION, SOLUTION INTRAVENOUS at 11:15

## 2024-07-21 RX ADMIN — AMLODIPINE BESYLATE 5 MG: 5 TABLET ORAL at 09:11

## 2024-07-21 RX ADMIN — METHOCARBAMOL 500 MG: 500 TABLET ORAL at 17:43

## 2024-07-21 RX ADMIN — METHOCARBAMOL 500 MG: 500 TABLET ORAL at 09:11

## 2024-07-21 RX ADMIN — POTASSIUM CHLORIDE 10 MEQ: 750 TABLET, EXTENDED RELEASE ORAL at 09:10

## 2024-07-21 RX ADMIN — ENOXAPARIN SODIUM 40 MG: 100 INJECTION SUBCUTANEOUS at 17:43

## 2024-07-21 RX ADMIN — OXYCODONE HYDROCHLORIDE 5 MG: 5 TABLET ORAL at 02:23

## 2024-07-21 RX ADMIN — OXYCODONE HYDROCHLORIDE 5 MG: 5 TABLET ORAL at 09:10

## 2024-07-21 RX ADMIN — ASPIRIN 325 MG: 325 TABLET ORAL at 09:11

## 2024-07-21 RX ADMIN — IOPAMIDOL 100 ML: 755 INJECTION, SOLUTION INTRAVENOUS at 13:56

## 2024-07-21 RX ADMIN — Medication 50000 UNITS: at 11:16

## 2024-07-21 RX ADMIN — HYDROMORPHONE HYDROCHLORIDE 0.5 MG: 1 INJECTION, SOLUTION INTRAMUSCULAR; INTRAVENOUS; SUBCUTANEOUS at 22:53

## 2024-07-21 RX ADMIN — ATORVASTATIN CALCIUM 80 MG: 40 TABLET, FILM COATED ORAL at 20:18

## 2024-07-21 RX ADMIN — ABACAVIR SULFATE, DOLUTEGRAVIR SODIUM, LAMIVUDINE 600 MG: 600; 50; 300 TABLET, FILM COATED ORAL at 20:26

## 2024-07-21 RX ADMIN — DEXTROSE AND SODIUM CHLORIDE: 5; 450 INJECTION, SOLUTION INTRAVENOUS at 02:27

## 2024-07-21 NOTE — PLAN OF CARE
Problem: Discharge Planning  Goal: Discharge to home or other facility with appropriate resources  7/20/2024 2030 by Addie Serrano RN  Outcome: Progressing  7/20/2024 1803 by Calista Laird RN  Outcome: Progressing     Problem: Pain  Goal: Verbalizes/displays adequate comfort level or baseline comfort level  7/20/2024 2030 by Addie Serrano RN  Outcome: Progressing  7/20/2024 1803 by Calista Laird RN  Outcome: Progressing     Problem: ABCDS Injury Assessment  Goal: Absence of physical injury  7/20/2024 2030 by Addie Serrano RN  Outcome: Progressing  7/20/2024 1803 by Calista Laird RN  Outcome: Progressing     Problem: Safety - Adult  Goal: Free from fall injury  7/20/2024 2030 by Addie Serrano RN  Outcome: Progressing  7/20/2024 1803 by Calista Laird RN  Outcome: Progressing     Problem: Risk for Elopement  Goal: Patient will not exit the unit/facility without proper excort  7/20/2024 2030 by Addie Serrano RN  Outcome: Progressing  7/20/2024 1803 by Calista Laird RN  Outcome: Progressing  Flowsheets (Taken 7/20/2024 1751)  Nursing Interventions for Elopement Risk:   Assist with personal care needs such as toileting, eating, dressing, as needed to reduce the risk of wandering   Make sure patient has all necessary personal care items   Reduce environmental triggers

## 2024-07-22 PROCEDURE — 1100000000 HC RM PRIVATE

## 2024-07-22 PROCEDURE — 99222 1ST HOSP IP/OBS MODERATE 55: CPT | Performed by: HOSPITALIST

## 2024-07-22 PROCEDURE — 6360000002 HC RX W HCPCS

## 2024-07-22 PROCEDURE — 6370000000 HC RX 637 (ALT 250 FOR IP)

## 2024-07-22 PROCEDURE — 6370000000 HC RX 637 (ALT 250 FOR IP): Performed by: SURGERY

## 2024-07-22 PROCEDURE — 94761 N-INVAS EAR/PLS OXIMETRY MLT: CPT

## 2024-07-22 PROCEDURE — 2580000003 HC RX 258

## 2024-07-22 RX ORDER — GABAPENTIN 300 MG/1
600 CAPSULE ORAL 3 TIMES DAILY
Status: COMPLETED | OUTPATIENT
Start: 2024-07-22 | End: 2024-07-23

## 2024-07-22 RX ADMIN — HYDROMORPHONE HYDROCHLORIDE 0.5 MG: 1 INJECTION, SOLUTION INTRAMUSCULAR; INTRAVENOUS; SUBCUTANEOUS at 18:37

## 2024-07-22 RX ADMIN — AMLODIPINE BESYLATE 5 MG: 5 TABLET ORAL at 08:54

## 2024-07-22 RX ADMIN — ASPIRIN 325 MG: 325 TABLET ORAL at 08:54

## 2024-07-22 RX ADMIN — DEXTROSE AND SODIUM CHLORIDE: 5; 450 INJECTION, SOLUTION INTRAVENOUS at 06:41

## 2024-07-22 RX ADMIN — METHOCARBAMOL 500 MG: 500 TABLET ORAL at 12:42

## 2024-07-22 RX ADMIN — GABAPENTIN 600 MG: 300 CAPSULE ORAL at 20:38

## 2024-07-22 RX ADMIN — ATORVASTATIN CALCIUM 80 MG: 40 TABLET, FILM COATED ORAL at 20:39

## 2024-07-22 RX ADMIN — DEXTROSE AND SODIUM CHLORIDE: 5; 450 INJECTION, SOLUTION INTRAVENOUS at 16:08

## 2024-07-22 RX ADMIN — POTASSIUM CHLORIDE 10 MEQ: 750 TABLET, EXTENDED RELEASE ORAL at 08:54

## 2024-07-22 RX ADMIN — METHOCARBAMOL 500 MG: 500 TABLET ORAL at 08:53

## 2024-07-22 RX ADMIN — METHOCARBAMOL 500 MG: 500 TABLET ORAL at 20:38

## 2024-07-22 RX ADMIN — SODIUM CHLORIDE, PRESERVATIVE FREE 10 ML: 5 INJECTION INTRAVENOUS at 20:41

## 2024-07-22 RX ADMIN — ENOXAPARIN SODIUM 40 MG: 100 INJECTION SUBCUTANEOUS at 18:32

## 2024-07-22 RX ADMIN — OXYCODONE HYDROCHLORIDE 5 MG: 5 TABLET ORAL at 12:42

## 2024-07-22 RX ADMIN — HYDROMORPHONE HYDROCHLORIDE 0.5 MG: 1 INJECTION, SOLUTION INTRAMUSCULAR; INTRAVENOUS; SUBCUTANEOUS at 06:38

## 2024-07-22 RX ADMIN — ABACAVIR SULFATE, DOLUTEGRAVIR SODIUM, LAMIVUDINE 600 MG: 600; 50; 300 TABLET, FILM COATED ORAL at 20:39

## 2024-07-22 RX ADMIN — METHOCARBAMOL 500 MG: 500 TABLET ORAL at 18:30

## 2024-07-22 RX ADMIN — SODIUM CHLORIDE, PRESERVATIVE FREE 10 ML: 5 INJECTION INTRAVENOUS at 08:55

## 2024-07-22 NOTE — PLAN OF CARE
Problem: Discharge Planning  Goal: Discharge to home or other facility with appropriate resources  7/21/2024 2306 by Marco A Hidalgo RN  Outcome: Progressing  7/21/2024 1546 by Cinthya Ross RN  Outcome: Progressing     Problem: Pain  Goal: Verbalizes/displays adequate comfort level or baseline comfort level  7/21/2024 2306 by Marco A Hidalgo RN  Outcome: Progressing  7/21/2024 1546 by Cinthya Ross RN  Outcome: Progressing     Problem: ABCDS Injury Assessment  Goal: Absence of physical injury  7/21/2024 2306 by Marco A Hidalgo RN  Outcome: Progressing  7/21/2024 1546 by Cinthya Ross RN  Outcome: Progressing     Problem: Safety - Adult  Goal: Free from fall injury  7/21/2024 2306 by Marco A Hidalgo RN  Outcome: Progressing  7/21/2024 1546 by Cinthya Ross RN  Outcome: Progressing     Problem: Risk for Elopement  Goal: Patient will not exit the unit/facility without proper excort  7/21/2024 2306 by Marco A Hidalgo RN  Outcome: Progressing  7/21/2024 1546 by Cinthya Ross RN  Outcome: Progressing

## 2024-07-22 NOTE — CONSULTS
Hospitalist Consult Note    Patient: Vianca Alonzo MRN: [id number]  [facility]: [id number]    YOB: 1965  Age: 58 y.o.  Sex: female    DOA: 7/20/2024 LOS:  LOS: 2 days        Requesting Physician: Dr. Mccormack   Reason for Consultation:  Medical Management    Chief Complaint: Medical management    Assessment/Plan     Patient Active Problem List   Diagnosis    Limb ischemia    Current tobacco use    Primary hypertension    Obesity (BMI 30-39.9)    Acute ischemic stroke (HCC)    History of DVT (deep vein thrombosis)    History of HIV infection (HCC)    Stenosis of right carotid artery    PVD (peripheral vascular disease) ([facility])         A/P:  History of peripheral vascular disease-management per vascular surgery, discussed with vascular surgery, plan for intervention this week.  Patient has a very low NINO.  History of HIV, asymptomatic-continue Triumeq  History of hypertension-resume home medications, continue to monitor blood pressure.  History of DVT-continue Lovenox, patient was on Eliquis which she is not currently taking.  H/o CVA-continue aspirin and statin therapy  DVT prophylaxis/Lovenox  Full code    HPI:     Vianca Alonzo is a 58 y.o. female with a hx of HIV asymptomatic, hypertension, DVT, CVA, peripheral vascular disease presents to the emergency room secondary to chronic pain in her lower extremities.  Patient admitted by vascular surgery, noted to have a low NINO, plan for angiogram this week.  Hospitalist consulted for medical management.  Continue chronic medications.      Past Medical History:   Diagnosis Date    Hypertension     Tobacco abuse        Past Surgical History:   Procedure Laterality Date    CAROTID ENDARTERECTOMY Right 3/21/2023    RIGHT CAROTID ENDARTERECTOMY WITH ELECTROENCEPHALOGRAPHY MONITORING / PATCH ANGIOPLASTY performed by Giselle Humphreys MD at [facility] CARDIAC SURGERY       Family History   Problem Relation Age of Onset    No Known Problems Sister     Breast Cancer

## 2024-07-23 PROCEDURE — 6370000000 HC RX 637 (ALT 250 FOR IP)

## 2024-07-23 PROCEDURE — 6370000000 HC RX 637 (ALT 250 FOR IP): Performed by: HOSPITALIST

## 2024-07-23 PROCEDURE — 6360000002 HC RX W HCPCS

## 2024-07-23 PROCEDURE — 6370000000 HC RX 637 (ALT 250 FOR IP): Performed by: SURGERY

## 2024-07-23 PROCEDURE — 1100000000 HC RM PRIVATE

## 2024-07-23 PROCEDURE — 2580000003 HC RX 258

## 2024-07-23 PROCEDURE — 99232 SBSQ HOSP IP/OBS MODERATE 35: CPT | Performed by: HOSPITALIST

## 2024-07-23 PROCEDURE — 94761 N-INVAS EAR/PLS OXIMETRY MLT: CPT

## 2024-07-23 RX ORDER — ASPIRIN 81 MG/1
81 TABLET, CHEWABLE ORAL DAILY
Status: DISCONTINUED | OUTPATIENT
Start: 2024-07-23 | End: 2024-07-25 | Stop reason: HOSPADM

## 2024-07-23 RX ORDER — GABAPENTIN 400 MG/1
400 CAPSULE ORAL 3 TIMES DAILY
Status: DISCONTINUED | OUTPATIENT
Start: 2024-07-23 | End: 2024-07-25 | Stop reason: HOSPADM

## 2024-07-23 RX ADMIN — METHOCARBAMOL 500 MG: 500 TABLET ORAL at 13:19

## 2024-07-23 RX ADMIN — ASPIRIN 325 MG: 325 TABLET ORAL at 08:29

## 2024-07-23 RX ADMIN — HYDROMORPHONE HYDROCHLORIDE 0.5 MG: 1 INJECTION, SOLUTION INTRAMUSCULAR; INTRAVENOUS; SUBCUTANEOUS at 21:12

## 2024-07-23 RX ADMIN — GABAPENTIN 600 MG: 300 CAPSULE ORAL at 08:29

## 2024-07-23 RX ADMIN — METHOCARBAMOL 500 MG: 500 TABLET ORAL at 21:12

## 2024-07-23 RX ADMIN — ABACAVIR SULFATE, DOLUTEGRAVIR SODIUM, LAMIVUDINE 600 MG: 600; 50; 300 TABLET, FILM COATED ORAL at 21:15

## 2024-07-23 RX ADMIN — ENOXAPARIN SODIUM 40 MG: 100 INJECTION SUBCUTANEOUS at 17:22

## 2024-07-23 RX ADMIN — OXYCODONE HYDROCHLORIDE 5 MG: 5 TABLET ORAL at 01:20

## 2024-07-23 RX ADMIN — AMLODIPINE BESYLATE 5 MG: 5 TABLET ORAL at 08:29

## 2024-07-23 RX ADMIN — METHOCARBAMOL 500 MG: 500 TABLET ORAL at 17:22

## 2024-07-23 RX ADMIN — METHOCARBAMOL 500 MG: 500 TABLET ORAL at 08:29

## 2024-07-23 RX ADMIN — GABAPENTIN 400 MG: 400 CAPSULE ORAL at 21:12

## 2024-07-23 RX ADMIN — SODIUM CHLORIDE, PRESERVATIVE FREE 10 ML: 5 INJECTION INTRAVENOUS at 04:15

## 2024-07-23 RX ADMIN — ATORVASTATIN CALCIUM 80 MG: 40 TABLET, FILM COATED ORAL at 21:12

## 2024-07-23 RX ADMIN — SODIUM CHLORIDE, PRESERVATIVE FREE 10 ML: 5 INJECTION INTRAVENOUS at 21:13

## 2024-07-23 RX ADMIN — POTASSIUM CHLORIDE 10 MEQ: 750 TABLET, EXTENDED RELEASE ORAL at 08:29

## 2024-07-23 RX ADMIN — GABAPENTIN 600 MG: 300 CAPSULE ORAL at 13:19

## 2024-07-23 RX ADMIN — HYDROMORPHONE HYDROCHLORIDE 0.5 MG: 1 INJECTION, SOLUTION INTRAMUSCULAR; INTRAVENOUS; SUBCUTANEOUS at 04:12

## 2024-07-23 RX ADMIN — SODIUM CHLORIDE, PRESERVATIVE FREE 10 ML: 5 INJECTION INTRAVENOUS at 08:31

## 2024-07-23 NOTE — PLAN OF CARE
Problem: Discharge Planning  Goal: Discharge to home or other facility with appropriate resources  Outcome: Progressing  Flowsheets (Taken 7/22/2024 0800 by Nevin Bonner, RN)  Discharge to home or other facility with appropriate resources: Identify barriers to discharge with patient and caregiver     Problem: Pain  Goal: Verbalizes/displays adequate comfort level or baseline comfort level  Outcome: Progressing  Flowsheets (Taken 7/22/2024 0738 by Nevin Bonner, RN)  Verbalizes/displays adequate comfort level or baseline comfort level: Encourage patient to monitor pain and request assistance     Problem: ABCDS Injury Assessment  Goal: Absence of physical injury  Outcome: Progressing     Problem: Safety - Adult  Goal: Free from fall injury  Outcome: Progressing

## 2024-07-23 NOTE — CARE COORDINATION
07/23/24 1257   Discharge Planning   Living Arrangements Alone   Support Systems Family Members   Current DME Prior to Arrival Other (Comment)  (None)   Potential Assistance Needed Other (Comment)  (Pt to follow up with Social [employer] regarding Medicare)   Potential Assistance Purchasing Medications No   M2B Y/N Not Assessed   Potential DME Needed Other (Comment)  (Pending PT/OT recs)   Type of Home Care Services None   Patient expects to be discharged to: House         SW met with pt at bedside, introduced self and explained role. Pt presented as A&Ox4 and agreeable to visit. Pt confirmed the demographics on file and reported that she resides alone in a 1 level home with 0 steps to its entrance. Pt stated she is independent with all ADLs and does not own or use any DME.     Per the pt she takes medicaid transport to all OP appointments. Pt stated that family will transport her home at the tie of discharge. Per the pt in the event HH is recommended she has no preference in an agency.     SW sent HH and SNF referrals via [employer] as a proactive measure.     [facility] will continue to follow.    CHRISTIANA Mosqueda  Case Management Department

## 2024-07-24 PROBLEM — I63.9 ACUTE ISCHEMIC STROKE (HCC): Status: RESOLVED | Noted: 2023-03-18 | Resolved: 2024-07-24

## 2024-07-24 PROBLEM — Z86.73 HISTORY OF CVA IN ADULTHOOD: Status: ACTIVE | Noted: 2024-07-24

## 2024-07-24 PROBLEM — Z21 ASYMPTOMATIC HIV INFECTION, WITH NO HISTORY OF HIV-RELATED ILLNESS (HCC): Status: ACTIVE | Noted: 2024-07-24

## 2024-07-24 LAB
ACT BLD: 183 SECS (ref 79–138)
ALBUMIN SERPL-MCNC: 3.2 G/DL (ref 3.4–5)
ALBUMIN/GLOB SERPL: 0.8 (ref 0.8–1.7)
ALP SERPL-CCNC: 141 U/L (ref 45–117)
ALT SERPL-CCNC: 35 U/L (ref 13–56)
ANION GAP SERPL CALC-SCNC: 7 MMOL/L (ref 3–18)
AST SERPL-CCNC: 28 U/L (ref 10–38)
BASOPHILS # BLD: 0 K/UL (ref 0–0.1)
BASOPHILS NFR BLD: 1 % (ref 0–2)
BILIRUB SERPL-MCNC: 0.9 MG/DL (ref 0.2–1)
BUN SERPL-MCNC: 15 MG/DL (ref 7–18)
BUN/CREAT SERPL: 21 (ref 12–20)
CALCIUM SERPL-MCNC: 9.2 MG/DL (ref 8.5–10.1)
CHLORIDE SERPL-SCNC: 106 MMOL/L (ref 100–111)
CO2 SERPL-SCNC: 25 MMOL/L (ref 21–32)
CREAT SERPL-MCNC: 0.73 MG/DL (ref 0.6–1.3)
DIFFERENTIAL METHOD BLD: ABNORMAL
EOSINOPHIL # BLD: 0.2 K/UL (ref 0–0.4)
EOSINOPHIL NFR BLD: 4 % (ref 0–5)
ERYTHROCYTE [DISTWIDTH] IN BLOOD BY AUTOMATED COUNT: 14.3 % (ref 11.6–14.5)
GLOBULIN SER CALC-MCNC: 4.1 G/DL (ref 2–4)
GLUCOSE SERPL-MCNC: 113 MG/DL (ref 74–99)
HCT VFR BLD AUTO: 37.7 % (ref 35–45)
HGB BLD-MCNC: 11.8 G/DL (ref 12–16)
IMM GRANULOCYTES # BLD AUTO: 0 K/UL (ref 0–0.04)
IMM GRANULOCYTES NFR BLD AUTO: 1 % (ref 0–0.5)
INR PPP: 1 (ref 0.9–1.1)
LYMPHOCYTES # BLD: 1.6 K/UL (ref 0.9–3.6)
LYMPHOCYTES NFR BLD: 33 % (ref 21–52)
MCH RBC QN AUTO: 27.1 PG (ref 24–34)
MCHC RBC AUTO-ENTMCNC: 31.3 G/DL (ref 31–37)
MCV RBC AUTO: 86.5 FL (ref 78–100)
MONOCYTES # BLD: 0.3 K/UL (ref 0.05–1.2)
MONOCYTES NFR BLD: 7 % (ref 3–10)
NEUTS SEG # BLD: 2.6 K/UL (ref 1.8–8)
NEUTS SEG NFR BLD: 55 % (ref 40–73)
NRBC # BLD: 0 K/UL (ref 0–0.01)
NRBC BLD-RTO: 0 PER 100 WBC
PLATELET # BLD AUTO: 257 K/UL (ref 135–420)
PMV BLD AUTO: 10.7 FL (ref 9.2–11.8)
POTASSIUM SERPL-SCNC: 4 MMOL/L (ref 3.5–5.5)
PROT SERPL-MCNC: 7.3 G/DL (ref 6.4–8.2)
PROTHROMBIN TIME: 12.9 SEC (ref 11.9–14.9)
RBC # BLD AUTO: 4.36 M/UL (ref 4.2–5.3)
SODIUM SERPL-SCNC: 138 MMOL/L (ref 136–145)
WBC # BLD AUTO: 4.8 K/UL (ref 4.6–13.2)

## 2024-07-24 PROCEDURE — 75710 ARTERY X-RAYS ARM/LEG: CPT | Performed by: SURGERY

## 2024-07-24 PROCEDURE — 85347 COAGULATION TIME ACTIVATED: CPT

## 2024-07-24 PROCEDURE — 75716 ARTERY X-RAYS ARMS/LEGS: CPT | Performed by: SURGERY

## 2024-07-24 PROCEDURE — 6360000002 HC RX W HCPCS

## 2024-07-24 PROCEDURE — C1769 GUIDE WIRE: HCPCS | Performed by: SURGERY

## 2024-07-24 PROCEDURE — 6370000000 HC RX 637 (ALT 250 FOR IP)

## 2024-07-24 PROCEDURE — C1894 INTRO/SHEATH, NON-LASER: HCPCS | Performed by: SURGERY

## 2024-07-24 PROCEDURE — B41J1ZZ FLUOROSCOPY OF OTHER LOWER ARTERIES USING LOW OSMOLAR CONTRAST: ICD-10-PCS | Performed by: SURGERY

## 2024-07-24 PROCEDURE — 85610 PROTHROMBIN TIME: CPT

## 2024-07-24 PROCEDURE — 6370000000 HC RX 637 (ALT 250 FOR IP): Performed by: HOSPITALIST

## 2024-07-24 PROCEDURE — 76000 FLUOROSCOPY <1 HR PHYS/QHP: CPT | Performed by: SURGERY

## 2024-07-24 PROCEDURE — C1887 CATHETER, GUIDING: HCPCS | Performed by: SURGERY

## 2024-07-24 PROCEDURE — 80053 COMPREHEN METABOLIC PANEL: CPT

## 2024-07-24 PROCEDURE — 99152 MOD SED SAME PHYS/QHP 5/>YRS: CPT | Performed by: SURGERY

## 2024-07-24 PROCEDURE — 85347 COAGULATION TIME ACTIVATED: CPT | Performed by: SURGERY

## 2024-07-24 PROCEDURE — 99232 SBSQ HOSP IP/OBS MODERATE 35: CPT | Performed by: STUDENT IN AN ORGANIZED HEALTH CARE EDUCATION/TRAINING PROGRAM

## 2024-07-24 PROCEDURE — 2500000003 HC RX 250 WO HCPCS: Performed by: SURGERY

## 2024-07-24 PROCEDURE — 37221 HC ILIAC TERRITORY PLASTY STENT: CPT | Performed by: SURGERY

## 2024-07-24 PROCEDURE — 76937 US GUIDE VASCULAR ACCESS: CPT | Performed by: SURGERY

## 2024-07-24 PROCEDURE — C1725 CATH, TRANSLUMIN NON-LASER: HCPCS | Performed by: SURGERY

## 2024-07-24 PROCEDURE — 1100000000 HC RM PRIVATE

## 2024-07-24 PROCEDURE — 6360000002 HC RX W HCPCS: Performed by: SURGERY

## 2024-07-24 PROCEDURE — 2580000003 HC RX 258: Performed by: SURGERY

## 2024-07-24 PROCEDURE — 047C3DZ DILATION OF RIGHT COMMON ILIAC ARTERY WITH INTRALUMINAL DEVICE, PERCUTANEOUS APPROACH: ICD-10-PCS | Performed by: SURGERY

## 2024-07-24 PROCEDURE — 2580000003 HC RX 258

## 2024-07-24 PROCEDURE — 85025 COMPLETE CBC W/AUTO DIFF WBC: CPT

## 2024-07-24 PROCEDURE — 6360000004 HC RX CONTRAST MEDICATION: Performed by: SURGERY

## 2024-07-24 PROCEDURE — 6370000000 HC RX 637 (ALT 250 FOR IP): Performed by: SURGERY

## 2024-07-24 PROCEDURE — C1760 CLOSURE DEV, VASC: HCPCS | Performed by: SURGERY

## 2024-07-24 PROCEDURE — 36246 INS CATH ABD/L-EXT ART 2ND: CPT | Performed by: SURGERY

## 2024-07-24 PROCEDURE — 36415 COLL VENOUS BLD VENIPUNCTURE: CPT

## 2024-07-24 PROCEDURE — C1874 STENT, COATED/COV W/DEL SYS: HCPCS | Performed by: SURGERY

## 2024-07-24 PROCEDURE — 99153 MOD SED SAME PHYS/QHP EA: CPT | Performed by: SURGERY

## 2024-07-24 PROCEDURE — 75630 X-RAY AORTA LEG ARTERIES: CPT | Performed by: SURGERY

## 2024-07-24 PROCEDURE — 2709999900 HC NON-CHARGEABLE SUPPLY: Performed by: SURGERY

## 2024-07-24 DEVICE — IMPLANTABLE DEVICE: Type: IMPLANTABLE DEVICE | Status: FUNCTIONAL

## 2024-07-24 RX ORDER — CLOPIDOGREL BISULFATE 75 MG/1
75 TABLET ORAL DAILY
Status: DISCONTINUED | OUTPATIENT
Start: 2024-07-25 | End: 2024-07-25 | Stop reason: HOSPADM

## 2024-07-24 RX ORDER — CLOPIDOGREL BISULFATE 75 MG/1
300 TABLET ORAL ONCE
Status: COMPLETED | OUTPATIENT
Start: 2024-07-24 | End: 2024-07-24

## 2024-07-24 RX ORDER — HYDRALAZINE HYDROCHLORIDE 20 MG/ML
INJECTION INTRAMUSCULAR; INTRAVENOUS PRN
Status: DISCONTINUED | OUTPATIENT
Start: 2024-07-24 | End: 2024-07-24 | Stop reason: HOSPADM

## 2024-07-24 RX ORDER — HEPARIN SODIUM 1000 [USP'U]/ML
INJECTION, SOLUTION INTRAVENOUS; SUBCUTANEOUS PRN
Status: DISCONTINUED | OUTPATIENT
Start: 2024-07-24 | End: 2024-07-24 | Stop reason: HOSPADM

## 2024-07-24 RX ORDER — IODIXANOL 320 MG/ML
INJECTION, SOLUTION INTRAVASCULAR PRN
Status: DISCONTINUED | OUTPATIENT
Start: 2024-07-24 | End: 2024-07-24 | Stop reason: HOSPADM

## 2024-07-24 RX ORDER — MIDAZOLAM HYDROCHLORIDE 1 MG/ML
INJECTION INTRAMUSCULAR; INTRAVENOUS PRN
Status: DISCONTINUED | OUTPATIENT
Start: 2024-07-24 | End: 2024-07-24 | Stop reason: HOSPADM

## 2024-07-24 RX ORDER — LABETALOL HYDROCHLORIDE 5 MG/ML
INJECTION, SOLUTION INTRAVENOUS PRN
Status: DISCONTINUED | OUTPATIENT
Start: 2024-07-24 | End: 2024-07-24 | Stop reason: HOSPADM

## 2024-07-24 RX ORDER — FENTANYL CITRATE 50 UG/ML
INJECTION, SOLUTION INTRAMUSCULAR; INTRAVENOUS PRN
Status: DISCONTINUED | OUTPATIENT
Start: 2024-07-24 | End: 2024-07-24 | Stop reason: HOSPADM

## 2024-07-24 RX ADMIN — METHOCARBAMOL 500 MG: 500 TABLET ORAL at 12:46

## 2024-07-24 RX ADMIN — HYDROMORPHONE HYDROCHLORIDE 0.5 MG: 1 INJECTION, SOLUTION INTRAMUSCULAR; INTRAVENOUS; SUBCUTANEOUS at 15:45

## 2024-07-24 RX ADMIN — HYDROMORPHONE HYDROCHLORIDE 0.5 MG: 1 INJECTION, SOLUTION INTRAMUSCULAR; INTRAVENOUS; SUBCUTANEOUS at 12:47

## 2024-07-24 RX ADMIN — HYDROMORPHONE HYDROCHLORIDE 0.5 MG: 1 INJECTION, SOLUTION INTRAMUSCULAR; INTRAVENOUS; SUBCUTANEOUS at 03:33

## 2024-07-24 RX ADMIN — GABAPENTIN 400 MG: 400 CAPSULE ORAL at 20:37

## 2024-07-24 RX ADMIN — ENOXAPARIN SODIUM 40 MG: 100 INJECTION SUBCUTANEOUS at 17:44

## 2024-07-24 RX ADMIN — METHOCARBAMOL 500 MG: 500 TABLET ORAL at 20:37

## 2024-07-24 RX ADMIN — OXYCODONE HYDROCHLORIDE 5 MG: 5 TABLET ORAL at 23:15

## 2024-07-24 RX ADMIN — GABAPENTIN 400 MG: 400 CAPSULE ORAL at 12:46

## 2024-07-24 RX ADMIN — ABACAVIR SULFATE, DOLUTEGRAVIR SODIUM, LAMIVUDINE 600 MG: 600; 50; 300 TABLET, FILM COATED ORAL at 20:37

## 2024-07-24 RX ADMIN — OXYCODONE HYDROCHLORIDE 5 MG: 5 TABLET ORAL at 18:46

## 2024-07-24 RX ADMIN — SODIUM CHLORIDE, PRESERVATIVE FREE 10 ML: 5 INJECTION INTRAVENOUS at 20:38

## 2024-07-24 RX ADMIN — HYDROMORPHONE HYDROCHLORIDE 0.5 MG: 1 INJECTION, SOLUTION INTRAMUSCULAR; INTRAVENOUS; SUBCUTANEOUS at 00:22

## 2024-07-24 RX ADMIN — ATORVASTATIN CALCIUM 80 MG: 40 TABLET, FILM COATED ORAL at 20:38

## 2024-07-24 RX ADMIN — CLOPIDOGREL BISULFATE 300 MG: 75 TABLET ORAL at 12:46

## 2024-07-24 RX ADMIN — METHOCARBAMOL 500 MG: 500 TABLET ORAL at 17:43

## 2024-07-24 NOTE — OP NOTE
the right common iliac artery, with reconstitution of the iliac bifurcation via collaterals. Patent  right internal iliac artery.  Patent left common iliac artery, with calcific plaque.  Patent left external iliac artery and  left common femoral artery. Small diseased left internal iliac artery   3.  Patent right external iliac artery which is underfilled and small compared to the left side.  Patent right common femoral artery and profunda femoris artery.  The right superficial femoral artery is diffusely diseased in its proximal portion, occluded from its mid to distal aspect, with reconstitution at the adductor canal via collaterals.  Patent popliteal artery, with three-vessel runoff to the leg.  Patent DP and PT, the dominant runoff through the PT.  The distal DP may be occluded  4.  Complete resolution of the right common iliac artery occlusion, after balloon angioplasty and stenting with preservation of the right internal iliac artery.    Detailed Description of Procedure:   Indication for the procedure: The patient is a 58-year-old lady, with history of peripheral arterial disease, smoking, HIV-on antiretroviral therapy, who presented with worsening right foot ischemic pain.  She had history of left foot ischemic rest pain, for which she had drug-eluting balloon angioplasty of the left distal common iliac artery and external iliac artery in 12/2022.  Ankle-brachial indices revealed right side ankle-brachial index of 0.31, with a toe brachial index 0.25.  They were  0.54 on the left side.  CT angiogram of the abdomen with runoff revealed near occlusion of the right common iliac artery, with underfilled right external iliac artery, and severe stenosis of the right internal iliac artery origin.  There was about 50% stenosis of the left proximal external iliac artery.  Bilateral SFAs were occluded with distal reconstitution.    Informed consent was obtained from the patient, for the procedure, after explaining the

## 2024-07-24 NOTE — INTERVAL H&P NOTE
Update History & Physical    The patient's History and Physical of July 20, 2024 was reviewed with the patient and I examined the patient.     Plan:  Angiogram, B/L LE possible intervention right side . The surgical site was confirmed by the patient and me.     The risks, benefits, expected outcome, and alternative to the recommended procedure have been discussed with the patient. Patient understands and wants to proceed with the procedure.     Electronically signed by Heike Peraza MD on 7/24/2024 at 9:20 AM

## 2024-07-24 NOTE — PLAN OF CARE
Problem: Discharge Planning  Goal: Discharge to home or other facility with appropriate resources  7/24/2024 1550 by Anamaria Ibanez RN  Outcome: Progressing  7/24/2024 0646 by Rachel Blankenship RN  Outcome: Progressing  Flowsheets (Taken 7/23/2024 2112)  Discharge to home or other facility with appropriate resources:   Arrange for needed discharge resources and transportation as appropriate   Identify barriers to discharge with patient and caregiver   Identify discharge learning needs (meds, wound care, etc)     Problem: Pain  Goal: Verbalizes/displays adequate comfort level or baseline comfort level  7/24/2024 1550 by Anamaria Ibanez RN  Outcome: Progressing  7/24/2024 0646 by Rachel Blankenship RN  Outcome: Progressing  Flowsheets (Taken 7/23/2024 2112)  Verbalizes/displays adequate comfort level or baseline comfort level:   Encourage patient to monitor pain and request assistance   Assess pain using appropriate pain scale   Administer analgesics based on type and severity of pain and evaluate response     Problem: ABCDS Injury Assessment  Goal: Absence of physical injury  7/24/2024 1550 by Anamaria Ibanez RN  Outcome: Progressing  7/24/2024 0646 by Rachel Blankenship RN  Outcome: Progressing  Flowsheets (Taken 7/23/2024 2112)  Absence of Physical Injury: Implement safety measures based on patient assessment     Problem: Safety - Adult  Goal: Free from fall injury  7/24/2024 1550 by Anamaria Ibanez RN  Outcome: Progressing  7/24/2024 0646 by Rachel Blankenship RN  Outcome: Progressing  Flowsheets (Taken 7/23/2024 2112)  Free From Fall Injury: Instruct family/caregiver on patient safety     Problem: Chronic Conditions and Co-morbidities  Goal: Patient's chronic conditions and co-morbidity symptoms are monitored and maintained or improved  Outcome: Progressing

## 2024-07-24 NOTE — PLAN OF CARE
Problem: Discharge Planning  Goal: Discharge to home or other facility with appropriate resources  Outcome: Progressing  Flowsheets (Taken 7/23/2024 2112)  Discharge to home or other facility with appropriate resources:   Arrange for needed discharge resources and transportation as appropriate   Identify barriers to discharge with patient and caregiver   Identify discharge learning needs (meds, wound care, etc)     Problem: Pain  Goal: Verbalizes/displays adequate comfort level or baseline comfort level  Outcome: Progressing  Flowsheets (Taken 7/23/2024 2112)  Verbalizes/displays adequate comfort level or baseline comfort level:   Encourage patient to monitor pain and request assistance   Assess pain using appropriate pain scale   Administer analgesics based on type and severity of pain and evaluate response     Problem: ABCDS Injury Assessment  Goal: Absence of physical injury  Outcome: Progressing  Flowsheets (Taken 7/23/2024 2112)  Absence of Physical Injury: Implement safety measures based on patient assessment     Problem: Safety - Adult  Goal: Free from fall injury  Outcome: Progressing  Flowsheets (Taken 7/23/2024 2112)  Free From Fall Injury: Instruct family/caregiver on patient safety     Problem: Risk for Elopement  Goal: Patient will not exit the unit/facility without proper excort  Outcome: Progressing  Flowsheets (Taken 7/23/2024 2112)  Nursing Interventions for Elopement Risk: Assist with personal care needs such as toileting, eating, dressing, as needed to reduce the risk of wandering

## 2024-07-24 NOTE — PRE SEDATION
Sedation Plan  ASA: class 3 - patient with severe systemic disease     Mallampati class: III - soft palate, base of uvula visible.    Sedation plan: local anesthesia and level 2-1: moderate/analgesia (conscious sedation)    Risks, benefits, and alternatives discussed with patient.  Use of blood products discussed with patient who consented to blood products.       Immediate reassessment prior to sedation:  Patient's status reviewed and vital signs assessed; acceptable to perform procedure and proceed to administer sedation as planned.

## 2024-07-25 ENCOUNTER — APPOINTMENT (OUTPATIENT)
Facility: HOSPITAL | Age: 59
DRG: 182 | End: 2024-07-25
Attending: SURGERY
Payer: MEDICAID

## 2024-07-25 VITALS
RESPIRATION RATE: 16 BRPM | OXYGEN SATURATION: 98 % | SYSTOLIC BLOOD PRESSURE: 152 MMHG | HEART RATE: 81 BPM | BODY MASS INDEX: 31.34 KG/M2 | WEIGHT: 195 LBS | DIASTOLIC BLOOD PRESSURE: 70 MMHG | TEMPERATURE: 98.1 F | HEIGHT: 66 IN

## 2024-07-25 LAB
ANION GAP SERPL CALC-SCNC: 5 MMOL/L (ref 3–18)
BUN SERPL-MCNC: 13 MG/DL (ref 7–18)
BUN/CREAT SERPL: 19 (ref 12–20)
CALCIUM SERPL-MCNC: 9 MG/DL (ref 8.5–10.1)
CHLORIDE SERPL-SCNC: 107 MMOL/L (ref 100–111)
CO2 SERPL-SCNC: 28 MMOL/L (ref 21–32)
CREAT SERPL-MCNC: 0.7 MG/DL (ref 0.6–1.3)
ECHO BSA: 2.03 M2
ERYTHROCYTE [DISTWIDTH] IN BLOOD BY AUTOMATED COUNT: 14.6 % (ref 11.6–14.5)
GLUCOSE SERPL-MCNC: 115 MG/DL (ref 74–99)
HCT VFR BLD AUTO: 33.2 % (ref 35–45)
HGB BLD-MCNC: 10.9 G/DL (ref 12–16)
MCH RBC QN AUTO: 28.2 PG (ref 24–34)
MCHC RBC AUTO-ENTMCNC: 32.8 G/DL (ref 31–37)
MCV RBC AUTO: 85.8 FL (ref 78–100)
NRBC # BLD: 0 K/UL (ref 0–0.01)
NRBC BLD-RTO: 0 PER 100 WBC
PLATELET # BLD AUTO: 226 K/UL (ref 135–420)
PMV BLD AUTO: 10.3 FL (ref 9.2–11.8)
POTASSIUM SERPL-SCNC: 3.7 MMOL/L (ref 3.5–5.5)
RBC # BLD AUTO: 3.87 M/UL (ref 4.2–5.3)
SODIUM SERPL-SCNC: 140 MMOL/L (ref 136–145)
WBC # BLD AUTO: 5.6 K/UL (ref 4.6–13.2)

## 2024-07-25 PROCEDURE — 99232 SBSQ HOSP IP/OBS MODERATE 35: CPT | Performed by: STUDENT IN AN ORGANIZED HEALTH CARE EDUCATION/TRAINING PROGRAM

## 2024-07-25 PROCEDURE — 94761 N-INVAS EAR/PLS OXIMETRY MLT: CPT

## 2024-07-25 PROCEDURE — 6370000000 HC RX 637 (ALT 250 FOR IP)

## 2024-07-25 PROCEDURE — 80048 BASIC METABOLIC PNL TOTAL CA: CPT

## 2024-07-25 PROCEDURE — 36415 COLL VENOUS BLD VENIPUNCTURE: CPT

## 2024-07-25 PROCEDURE — 6370000000 HC RX 637 (ALT 250 FOR IP): Performed by: HOSPITALIST

## 2024-07-25 PROCEDURE — 6370000000 HC RX 637 (ALT 250 FOR IP): Performed by: SURGERY

## 2024-07-25 PROCEDURE — 6360000002 HC RX W HCPCS

## 2024-07-25 PROCEDURE — 2580000003 HC RX 258

## 2024-07-25 PROCEDURE — 93923 UPR/LXTR ART STDY 3+ LVLS: CPT

## 2024-07-25 PROCEDURE — 85027 COMPLETE CBC AUTOMATED: CPT

## 2024-07-25 RX ORDER — OXYCODONE HYDROCHLORIDE 5 MG/1
5 TABLET ORAL EVERY 4 HOURS PRN
Qty: 20 TABLET | Refills: 0 | Status: SHIPPED | OUTPATIENT
Start: 2024-07-25 | End: 2024-07-30

## 2024-07-25 RX ORDER — CLOPIDOGREL BISULFATE 75 MG/1
75 TABLET ORAL DAILY
Qty: 30 TABLET | Refills: 3 | Status: SHIPPED | OUTPATIENT
Start: 2024-07-26

## 2024-07-25 RX ADMIN — POTASSIUM CHLORIDE 10 MEQ: 750 TABLET, EXTENDED RELEASE ORAL at 09:03

## 2024-07-25 RX ADMIN — METHOCARBAMOL 500 MG: 500 TABLET ORAL at 13:56

## 2024-07-25 RX ADMIN — GABAPENTIN 400 MG: 400 CAPSULE ORAL at 09:03

## 2024-07-25 RX ADMIN — SODIUM CHLORIDE, PRESERVATIVE FREE 10 ML: 5 INJECTION INTRAVENOUS at 09:02

## 2024-07-25 RX ADMIN — ASPIRIN 81 MG CHEWABLE TABLET 81 MG: 81 TABLET CHEWABLE at 09:04

## 2024-07-25 RX ADMIN — HYDROMORPHONE HYDROCHLORIDE 0.5 MG: 1 INJECTION, SOLUTION INTRAMUSCULAR; INTRAVENOUS; SUBCUTANEOUS at 09:02

## 2024-07-25 RX ADMIN — METHOCARBAMOL 500 MG: 500 TABLET ORAL at 09:04

## 2024-07-25 RX ADMIN — CLOPIDOGREL BISULFATE 75 MG: 75 TABLET ORAL at 09:04

## 2024-07-25 RX ADMIN — HYDROMORPHONE HYDROCHLORIDE 0.5 MG: 1 INJECTION, SOLUTION INTRAMUSCULAR; INTRAVENOUS; SUBCUTANEOUS at 02:17

## 2024-07-25 RX ADMIN — AMLODIPINE BESYLATE 5 MG: 5 TABLET ORAL at 09:04

## 2024-07-25 RX ADMIN — GABAPENTIN 400 MG: 400 CAPSULE ORAL at 13:56

## 2024-07-25 NOTE — PLAN OF CARE
Problem: Discharge Planning  Goal: Discharge to home or other facility with appropriate resources  7/25/2024 1249 by Nevin Bonner RN  Outcome: Adequate for Discharge  Flowsheets (Taken 7/25/2024 0803)  Discharge to home or other facility with appropriate resources: Identify barriers to discharge with patient and caregiver  7/25/2024 0644 by Rachel Blankenship RN  Outcome: Progressing  Flowsheets (Taken 7/24/2024 2037)  Discharge to home or other facility with appropriate resources:   Identify barriers to discharge with patient and caregiver   Arrange for needed discharge resources and transportation as appropriate   Arrange for interpreters to assist at discharge as needed   Identify discharge learning needs (meds, wound care, etc)   Refer to discharge planning if patient needs post-hospital services based on physician order or complex needs related to functional status, cognitive ability or social support system     Problem: Pain  Goal: Verbalizes/displays adequate comfort level or baseline comfort level  7/25/2024 1249 by Nevin Bonner RN  Outcome: Adequate for Discharge  7/25/2024 0644 by Rachel Blankenship RN  Outcome: Progressing  Flowsheets (Taken 7/24/2024 2037)  Verbalizes/displays adequate comfort level or baseline comfort level:   Encourage patient to monitor pain and request assistance   Assess pain using appropriate pain scale   Administer analgesics based on type and severity of pain and evaluate response   Implement non-pharmacological measures as appropriate and evaluate response     Problem: ABCDS Injury Assessment  Goal: Absence of physical injury  7/25/2024 1249 by Nevin Bonner RN  Outcome: Adequate for Discharge  7/25/2024 0644 by Rachel Blankenship RN  Outcome: Progressing  Flowsheets (Taken 7/24/2024 2037)  Absence of Physical Injury: Implement safety measures based on patient assessment     Problem: Safety - Adult  Goal: Free from fall injury  7/25/2024 1249 by Nevin Bonner RN  Outcome: Adequate for

## 2024-07-25 NOTE — PLAN OF CARE
Problem: Discharge Planning  Goal: Discharge to home or other facility with appropriate resources  Outcome: Progressing  Flowsheets (Taken 7/24/2024 2037)  Discharge to home or other facility with appropriate resources:   Identify barriers to discharge with patient and caregiver   Arrange for needed discharge resources and transportation as appropriate   Arrange for interpreters to assist at discharge as needed   Identify discharge learning needs (meds, wound care, etc)   Refer to discharge planning if patient needs post-hospital services based on physician order or complex needs related to functional status, cognitive ability or social support system     Problem: Pain  Goal: Verbalizes/displays adequate comfort level or baseline comfort level  Outcome: Progressing  Flowsheets (Taken 7/24/2024 2037)  Verbalizes/displays adequate comfort level or baseline comfort level:   Encourage patient to monitor pain and request assistance   Assess pain using appropriate pain scale   Administer analgesics based on type and severity of pain and evaluate response   Implement non-pharmacological measures as appropriate and evaluate response     Problem: ABCDS Injury Assessment  Goal: Absence of physical injury  Outcome: Progressing  Flowsheets (Taken 7/24/2024 2037)  Absence of Physical Injury: Implement safety measures based on patient assessment     Problem: Safety - Adult  Goal: Free from fall injury  Outcome: Progressing  Flowsheets (Taken 7/24/2024 2037)  Free From Fall Injury: Instruct family/caregiver on patient safety     Problem: Risk for Elopement  Goal: Patient will not exit the unit/facility without proper excort  Outcome: Progressing  Flowsheets (Taken 7/24/2024 2037)  Nursing Interventions for Elopement Risk: Assist with personal care needs such as toileting, eating, dressing, as needed to reduce the risk of wandering     Problem: Chronic Conditions and Co-morbidities  Goal: Patient's chronic conditions and

## 2024-07-25 NOTE — DISCHARGE SUMMARY
Physician Discharge Summary     Patient ID:  Vianca Alonzo  [id number]  58 y.o.  1965    Admit date: 7/20/2024    Discharge date: 7/25/2024      Admitting Physician: Heike Peraza MD     Discharge Physician: Heike Peraza MD     Admission Diagnoses: PVD (peripheral vascular disease) ([facility]) [I73.9]    Discharge Diagnoses: There are no discharge diagnoses documented for the most recent discharge.    Procedures for this admission: Procedure(s):  Angiography lower ext bilat    Discharged Condition: stable    Hospital Course:   Patient is postop day #1 from her bilateral lower extremity angiogram, right iliac angioplasty/stenting, right external lower extremity runoff.  Patient tolerated the procedure well.  Patient is awake alert and answers all questions appropriately.  Moves all extremities to command.  Patient has adequate hemostasis in her left groin incision.  Heike Peraza MD   Physician  Vascular     Op Note      Signed     Date of Service: 7/24/2024  9:35 AM  Case Time: Procedures: Surgeons:   7/24/2024  9:35 AM Angiography lower ext bilat    Heike Peraza MD               Signed         Operative Note        Patient: Vianca Alonzo  YOB: 1965  MRN: [id number]     Date of Procedure: 7/24/2024     Pre-Op Diagnosis Codes:     * PAD (peripheral artery disease) ([facility]) [I73.9]  Ischemic rest pain right foot     Post-Op Diagnosis: Same       Procedure(s):  Angiography lower ext bilat  1.  Ultrasound-guided bilateral retrograde common femoral artery access  2.  Right iliac angiogram with the catheter tip in the distal abdominal aorta  3.  Balloon angioplasty of the right common iliac artery using 8 x 40 mm [place] balloon  4.  Balloon expandable covered stenting of the right common iliac artery using 8 x 29 mm VBX covered stent  5.  Right lower extremity runoff performed through the right common femoral artery sheath  6.  Mynx closure of the bilateral common femoral artery

## 2024-07-25 NOTE — CARE COORDINATION
07/25/24 1040   /Social Work Whiteboard Notes   /Social Work Whiteboard GREEN 7/25 Home with no CM needs, family to transport.       CHRISTIANA Mosqueda  Case Management Department

## 2024-07-25 NOTE — PROGRESS NOTES
[facility] Hospitalist Group  Progress Note    Patient: Vianca Alonzo Age: 58 y.o. : 1965 MR#: [id number] SSN: [id number]  Date: 2024                 DVT Prophylaxis:  [x]Lovenox  []Hep SQ  []SCDs  []Coumadin   []On Heparin gtt []PO anticoagulant    Anticipated discharge: Home tomorrow, if cleared by vascular surgery     Subjective:     The patient was seen and examined at the bedside in follow-up for peripheral arterial disease and asymptomatic HIV.  The patient underwent a right lower extremity angiogram today.  She complained of mild pain in her right foot.  She denied any tingling, numbness or loss of sensation.    Objective:   VS: /77   Pulse 88   Temp 98.3 °F (36.8 °C) (Oral)   Resp 18   Ht 1.676 m (5' 6\")   Wt 88.5 kg (195 lb)   SpO2 97%   BMI 31.47 kg/m²    Tmax/24hrs: Temp (24hrs), Av.1 °F (36.7 °C), Min:97.9 °F (36.6 °C), Max:98.3 °F (36.8 °C)    Intake/Output Summary (Last 24 hours) at 2024 193  Last data filed at 2024 1107  Gross per 24 hour   Intake --   Output 15 ml   Net -15 ml        PHYSICAL EXAM  General Appearance: NAD, conversant  HENT: normocephalic/atraumatic, moist mucus membranes  Neck: No JVD, supple  Lungs: CTA with normal respiratory effort  CV: RRR, no m/r/g; decreased dorsalis pedis pulsations on the right side  Abdomen: soft, non-tender, normal bowel sounds  Extremities: no cyanosis, no peripheral edema  Neuro: No focal deficits, motor/sensory intact  Skin: Vitiliginous changes around the right eye  Psych: appropriate affect, alert and oriented to person, place and time    Current Facility-Administered Medications   Medication Dose Route Frequency    [START ON 2024] clopidogrel (PLAVIX) tablet 75 mg  75 mg Oral Daily    aspirin chewable tablet 81 mg  81 mg Oral Daily    gabapentin (NEURONTIN) capsule 400 mg  400 mg Oral TID    **Patient has home meds stored in the pharmacy. PLEASE retrieve upon discharge. 
1120: Bedside shift change report given to ROSIE Ann (oncoming nurse) by ROSIE Conrad (offgoing nurse). Report included the following information Nurse Handoff Report, Intake/Output, and MAR.  Pt currently in cath lab   
4 Eyes Skin Assessment     NAME:  Vianca Alonzo  YOB: 1965  MEDICAL RECORD NUMBER:  [id number]    The patient is being assessed for  Shift Handoff    I agree that at least one RN has performed a thorough Head to Toe Skin Assessment on the patient. ALL assessment sites listed below have been assessed.      Areas assessed by both nurses:    Head, Face, Ears, Shoulders, Back, Chest, Arms, Elbows, Hands, Sacrum. Buttock, Coccyx, Ischium, Legs. Feet and Heels, and Under Medical Devices         Does the Patient have a Wound? No noted wound(s)       Lemuel Prevention initiated by RN: Yes  Wound Care Orders initiated by RN: No    Pressure Injury (Stage 3,4, Unstageable, DTI, NWPT, and Complex wounds) if present, place Wound referral order by RN under : No    New Ostomies, if present place, Ostomy referral order under : No     Nurse 1 eSignature: Electronically signed by Rachel Blankenship RN on 7/25/24 at 7:02 AM EDT    **SHARE this note so that the co-signing nurse can place an eSignature**    Nurse 2 eSignature: {Esignature:[id number]}  
4 Eyes Skin Assessment     NAME:  Vianca Alonzo  YOB: 1965  MEDICAL RECORD NUMBER:  [id number]    The patient is being assessed for  Shift Handoff    I agree that at least one RN has performed a thorough Head to Toe Skin Assessment on the patient. ALL assessment sites listed below have been assessed.      Areas assessed by both nurses:    Head, Face, Ears, Shoulders, Back, Chest, Arms, Elbows, Hands, Sacrum. Buttock, Coccyx, Ischium, Legs. Feet and Heels, and Under Medical Devices         Does the Patient have a Wound? No noted wound(s)       Lemuel Prevention initiated by RN: No  Wound Care Orders initiated by RN: No    Pressure Injury (Stage 3,4, Unstageable, DTI, NWPT, and Complex wounds) if present, place Wound referral order by RN under : No    New Ostomies, if present place, Ostomy referral order under : No     Nurse 1 eSignature: Electronically signed by Cinthya Ross RN on 7/23/24 at 6:33 PM EDT    **SHARE this note so that the co-signing nurse can place an eSignature**    Nurse 2 eSignature: {Esignature:[id number]}   
4 Eyes Skin Assessment     NAME:  Vianca Alonzo  YOB: 1965  MEDICAL RECORD NUMBER:  [id number]    The patient is being assessed for  Shift Handoff    I agree that at least one RN has performed a thorough Head to Toe Skin Assessment on the patient. ALL assessment sites listed below have been assessed.      Areas assessed by both nurses:    Head, Face, Ears, Shoulders, Back, Chest, Arms, Elbows, Hands, Sacrum. Buttock, Coccyx, Ischium, Legs. Feet and Heels, and Under Medical Devices         Does the Patient have a Wound? No noted wound(s)       Elmuel Prevention initiated by RN: Yes  Wound Care Orders initiated by RN: No    Pressure Injury (Stage 3,4, Unstageable, DTI, NWPT, and Complex wounds) if present, place Wound referral order by RN under : No    New Ostomies, if present place, Ostomy referral order under : No     Nurse 1 eSignature: Electronically signed by Rachel Blankenship RN on 7/24/24 at 7:29 AM EDT    **SHARE this note so that the co-signing nurse can place an eSignature**    Nurse 2 eSignature: Electronically signed by Anamaria Ibanez RN on 7/24/24 at 7:32 AM EDT    
Admit Date: 2024    POD * No surgery date entered *    Procedure:  Procedure(s):  Angiography lower ext bilat    Subjective:   Patient seen, chart reviewed, all acute events noted.  Patient is awake alert and answers all question appropriate.  Patient states that she had a good night sleep and is resting well.  Tolerating her diet with no nausea or vomiting.  Patient states that she has spoken with the surgeon and is understanding about her upcoming procedure.  Willing to proceed as planned.  Patient denies any chest pain or shortness of breath.  Denies any dyspnea on exertion.  Patient reports that her breast pain has dissipated, she did get up to go to the bathroom and did experience some mild leg pain.  Tolerating her diet with no nausea or vomiting denies any bowel or bladder issues.    Objective:     Blood pressure (!) 155/78, pulse 78, temperature 98 °F (36.7 °C), temperature source Oral, resp. rate 16, height 1.676 m (5' 6\"), weight 88.5 kg (195 lb), SpO2 97 %.    Temp (24hrs), Av.1 °F (36.7 °C), Min:97.7 °F (36.5 °C), Max:98.5 °F (36.9 °C)      Physical Exam:    Constitutional:  Patient is well developed, well nourished, and not distressed.  Resting comfortably in bed.  Denies any lower extremity rest pain at this time.  HEENT: atraumatic, normocephalic, wearing a mask.   Eyes:   Cunjunctivae clear, no scleral icterus  Neck:   No JVD present.  No adenopathy. No carotid bruits or thrills noted bilaterally.   Cardiovascular:  Normal rate, regular rhythm, normal heart sounds. No murmur heard.  Pulmonary/Chest: Effort normal . No wheezes, rales or rhonchi noted.   Extremities: Both feet warm to touch.  Normal range of motion.  Non-palpable pedal pulses noted, monophasic Doppler signals noted bilaterally. No calf tenderness to palpation bilaterally. Neurovascularly intact bilaterally to both soft and deep sensation.  No lesions, rashes or edema noted on this visit.  Neurological:  he  is alert and 
Admit Date: 2024    POD * No surgery found *    Procedure:  * No surgery found *    Subjective:   Patient seen, chart reviewed, all acute events noted.  Patient is awake alert and answers all questions appropriate.  Patient moves all extremities to command.  Patient is well-known to our practice from previous vascular workup.  Patient has known carotid disease, who is status post carotid endarterectomy by Dr. Humphreys.  Patient has also been worked up 2 years ago for peripheral vascular disease by Dr. Peraza.   Vianca Alonzo is a pleasant but unfortunate 58 y.o.  female with history of PVD and carotid stenosis disease. Presents with complaint some short distance claudication and occassional rest pain.  Patient states that she cannot walk 1 block without having to stop because her legs are hurting.  She denies any recent leg ulceration skin rashes or skin lesions.  She has toe pain in the are of a callous.  No real tissue loss.    Presently the patient denies any chest pain or shortness of breath.  Denies any dyspnea on exertion.  She reports being comfortable in bed and denies any rest pain at this time.  Patient has been n.p.o. and states that she is hungry.  Denies any bowel or bladder issues.  Objective:     Blood pressure (!) 138/91, pulse 65, temperature 98.3 °F (36.8 °C), temperature source Oral, resp. rate 16, height 1.676 m (5' 6\"), weight 88.5 kg (195 lb), SpO2 96 %.    Temp (24hrs), Av.9 °F (36.6 °C), Min:97.4 °F (36.3 °C), Max:98.3 °F (36.8 °C)      Physical Exam:    Constitutional:  Patient is well developed, well nourished, and not distressed.  Resting comfortably in bed.  Denies any lower extremity rest pain at this time.  HEENT: atraumatic, normocephalic, wearing a mask.   Eyes:   Cunjunctivae clear, no scleral icterus  Neck:   No JVD present.  No adenopathy. No carotid bruits or thrills noted bilaterally.   Cardiovascular:  Normal rate, regular rhythm, normal heart 
Advance Care Planning   Healthcare Decision Maker:    Today we documented Decision Maker(s) consistent with Legal Next of Kin hierarchy.     Stephanie Reddy Brother/Sister   Home Phone: [phone]         conducted an initial consultation and Spiritual Assessment for Vianca Alnozo, who is a 58 y.o.,female. Patient's Primary Language is: English.   According to the patient's EMR Christian Affiliation is: Other.     The reason the Patient came to the hospital is:   Patient Active Problem List    Diagnosis Date Noted    Stenosis of right carotid artery 03/20/2023    Acute ischemic stroke (HCC) 03/18/2023    History of DVT (deep vein thrombosis) 03/18/2023    History of HIV infection (HCC) 03/18/2023    PVD (peripheral vascular disease) ([facility]) 07/20/2024    Current tobacco use 12/22/2022    Primary hypertension 12/22/2022    Obesity (BMI 30-39.9) 12/22/2022    Limb ischemia 12/21/2022        The  provided the following Interventions:  Initiated a relationship of care and support.   Provided information about Spiritual Care Services.  Chart reviewed.    The following outcomes where achieved:  Patient expressed gratitude for 's visit.    Assessment:  Patient does not have any Baptist/cultural needs that will affect patient's preferences in health care.  There are no spiritual or Baptist issues which require intervention at this time.     Plan:  Chaplains will continue to follow and will provide pastoral care on an as needed/requested basis.   recommends bedside caregivers page  on duty if patient shows signs of acute spiritual or emotional distress.    Chaplain Jessi Maloney  Spiritual Care   [phone]     
Cath holding summary     Patient escorted to cath holding from inpatient room 506, received report from inpatient RN, alert and oriented x 4, voicing no complaints.  Changed into gown and placed on monitor.   NPO since MN.  Lab results, med rec and H&P reviewed on chart.  PIV x 1 inserted PTA, flushed and patent.         
Cath holding summary:    1110: Verbal report received from Josafat on Vianca Alonzo being received from Cath Lab for routine post-op. Report consisted of patient's Situation, Background, Assessment and Recommendations (SBAR). Information from the following report(s) Nurse Handoff Report, MAR, Recent Results, Neuro Assessment, and Event Log was reviewed with the receiving nurse. Pt A&O x 4, no c/o pain. Opportunity for questions and clarification provided.  Procedure: Lower Extremity Angiogram  Intervention: Yes  Site: Right, Left, Groin  Stent placed to the R iliac artery. Closed with minx on both femoral access sites.    
Discharge instructions given verbal and written, PIV discontinued with catheter intact, all belongings packed and taken with patient, home medications retrieved from pharmacy and returned to patient. Transported to main entrance via wheelchair.   
Doing well  No pain currently  Motor and sensory intact  With decreased femoral pulses will get CTA  Hold lovenox in am  
Patient placed on purewick when she arrived to holding post procedure. Pt is on flat bedrest. She is A&Ox4.  
[facility] Hospitalist Group  Progress Note    Patient: Vianca Alonzo Age: 58 y.o. : 1965 MR#: [id number] SSN: [id number]  Date/Time: 2024     Subjective: Patient lying in the bed, feels fine.  Claudication pain improving.     Assessment/Plan:   Severe PAD  Hypertension  History of DVT  Asymptomatic HIV  History of CVA    Plan  Vascular surgery input noted, plan for angiogram tomorrow  Continue amlodipine, aspirin and statin  Continue Lovenox for DVT prophylaxis  Will monitor labs  Further plan based on hospital course  Will continue to follow-up      Dispo plan: Per primary team, possibly home, anticipated discharge date 2024    Discussed with patient at the bedside explained about my above plan care.  Patient understood and agreed with the plan.    Case discussed with:  [x]Patient  []Family  [x]Nursing  []Case Management  DVT Prophylaxis:  [x]Lovenox  []Hep SQ  []SCDs  []Coumadin   []Eliquis/Xarelto     Objective:   VS: /74   Pulse 74   Temp 98.1 °F (36.7 °C) (Oral)   Resp 16   Ht 1.676 m (5' 6\")   Wt 88.5 kg (195 lb)   SpO2 98%   BMI 31.47 kg/m²    Tmax/24hrs: Temp (24hrs), Av °F (36.7 °C), Min:97.6 °F (36.4 °C), Max:98.5 °F (36.9 °C)  IOBRIEF  Intake/Output Summary (Last 24 hours) at 2024 1815  Last data filed at 2024 1214  Gross per 24 hour   Intake 720 ml   Output --   Net 720 ml       General:  Alert, cooperative, no acute distress    Pulmonary:  CTA Bilaterally. No Wheezing/Rales.  Cardiovascular: Regular rate and Rhythm.  GI:  Soft, Non distended, Non tender. + Bowel sounds.  Extremities:  No edema. No calf tenderness.   Psych: Good insight. Not anxious or agitated.  Neurologic: Alert and oriented X 3. Moves all ext.  Additional: Chronic skin changes in the lower extremity, no cyanosis or gangrene noted    Medications:   Current Facility-Administered Medications   Medication Dose Route Frequency    aspirin chewable tablet 81 mg  81 
TRANSFER - OUT REPORT:    Verbal report given to Anamaria RN (name) on Vianca Alonzo  being transferred to [address] [place] [address](unit) for routine progression of patient care  Report consisted of patient’s Situation, Background, Assessment and   Recommendations(SBAR).   Information from the following report(s) SBAR, Recent Results, Quality Measures, and Dual Neuro Assessment was reviewed with the receiving nurse.  Lines:   Peripheral IV 07/23/24 Posterior;Right Forearm (Active)   Site Assessment Clean, dry & intact 07/23/24 2112   Line Status Capped 07/23/24 2112   Line Care Cap changed 07/23/24 2112   Phlebitis Assessment No symptoms 07/23/24 2112   Infiltration Assessment 0 07/23/24 2112   Alcohol Cap Used No 07/23/24 2112   Dressing Status Clean, dry & intact 07/23/24 2112   Dressing Type Transparent 07/23/24 2112     Opportunity for questions and clarification was provided.    Patient transported with:  Tech    
- 14.5 %    Platelets 226 135 - 420 K/uL    MPV 10.3 9.2 - 11.8 FL    Nucleated RBCs 0.0 0  WBC    nRBC 0.00 0.00 - 0.01 K/uL   Vascular lower arterial complete physiologic Doppler at rest    Collection Time: 07/25/24 11:46 AM   Result Value Ref Range    Right arm  mmHg    Right posterior tibial 81 mmHg    Right dorsalis pedis BP 70 mmHg    Right NINO 0.56     Right calf pressure 97 mmHg    Right toe pressure 61 mmHg    Right TBI 0.42     Left arm  mmHg    Left posterior tibial 67 mmHg    Left dorsalis pedis BP 60 mmHg    Left NINO 0.47     Left calf pressure 90 mmHg    Left toe pressure 87 mmHg    Left TBI 0.60     Body Surface Area 2.03 m2       Imaging:  No results found.    Assessment/Plan:     Peripheral artery disease  S/p bilateral lower extremity with balloon angioplasty of the right common iliac artery and stenting of the right common iliac artery on July 24, 2024  Continue patient on aspirin appropriate and atorvastatin  Continue analgesics as needed  The patient is medically stable for discharge.  Further management will be deferred to vascular surgery.      2.  Asymptomatic HIV infection  Continue patient on abacavir-dolutegravir-lamivudine    3.  Hypertension  Continue amlodipine    4.  History of CVA in adulthood  Continue antiplatelet therapy and antilipemic therapy    Please contact Dr. Salinas if there are any questions. Greater than 35 minutes were spent reviewing the patient's chart, obtaining a thorough history, performing a physical exam, placing orders and dictating this document.  Findings and plan were also discussed with the patient/patient's family and with RN.  Greater than 50% of the time was spent on direct patient care.           Signed By: [unfilled]     July 25, 2024 12:57 PM

## 2024-07-26 LAB
ECHO BSA: 2.03 M2
VAS LEFT ABI: 0.47
VAS LEFT ARM BP: 134 MMHG
VAS LEFT CALF PRESSURE: 90 MMHG
VAS LEFT DORSALIS PEDIS BP: 60 MMHG
VAS LEFT PTA BP: 67 MMHG
VAS LEFT TBI: 0.6
VAS LEFT TOE PRESSURE: 87 MMHG
VAS RIGHT ABI: 0.56
VAS RIGHT ARM BP: 144 MMHG
VAS RIGHT CALF PRESSURE: 97 MMHG
VAS RIGHT DORSALIS PEDIS BP: 70 MMHG
VAS RIGHT PTA BP: 81 MMHG
VAS RIGHT TBI: 0.42
VAS RIGHT TOE PRESSURE: 61 MMHG

## 2024-07-30 DIAGNOSIS — R25.2 MUSCLE CRAMPS: ICD-10-CM

## 2024-07-31 RX ORDER — TIZANIDINE 2 MG/1
TABLET ORAL
Qty: 60 TABLET | Refills: 2 | Status: SHIPPED | OUTPATIENT
Start: 2024-07-31

## 2024-08-14 DIAGNOSIS — I65.23 CAROTID STENOSIS, BILATERAL: Primary | ICD-10-CM

## 2024-08-15 ENCOUNTER — OFFICE VISIT (OUTPATIENT)
Age: 59
End: 2024-08-15
Payer: MEDICAID

## 2024-08-15 VITALS
SYSTOLIC BLOOD PRESSURE: 100 MMHG | BODY MASS INDEX: 29.57 KG/M2 | DIASTOLIC BLOOD PRESSURE: 60 MMHG | WEIGHT: 184 LBS | HEART RATE: 92 BPM | OXYGEN SATURATION: 100 % | HEIGHT: 66 IN

## 2024-08-15 DIAGNOSIS — I73.9 PAD (PERIPHERAL ARTERY DISEASE) (HCC): Primary | ICD-10-CM

## 2024-08-15 DIAGNOSIS — I65.22 CAROTID STENOSIS, ASYMPTOMATIC, LEFT: ICD-10-CM

## 2024-08-15 DIAGNOSIS — G62.9 NEUROPATHY: ICD-10-CM

## 2024-08-15 DIAGNOSIS — I73.9 PVD (PERIPHERAL VASCULAR DISEASE) (HCC): Primary | ICD-10-CM

## 2024-08-15 DIAGNOSIS — L97.511 SKIN ULCER OF TOE OF RIGHT FOOT, LIMITED TO BREAKDOWN OF SKIN (HCC): ICD-10-CM

## 2024-08-15 PROCEDURE — 3074F SYST BP LT 130 MM HG: CPT | Performed by: SURGERY

## 2024-08-15 PROCEDURE — 99214 OFFICE O/P EST MOD 30 MIN: CPT | Performed by: SURGERY

## 2024-08-15 PROCEDURE — 3078F DIAST BP <80 MM HG: CPT | Performed by: SURGERY

## 2024-08-15 NOTE — PROGRESS NOTES
08/15/24    Vianca Alonzo    Follow-up of PAD    History and Physical    Vianca Alonzo is a 58 y.o. female with PAD, worsening right lower extremity claudication and ischemic rest pain, for which she had right common iliac artery balloon expandable stenting with VBX on 7/24/2024.  Postprocedure ankle-brachial indices on 7/25/2024 revealed right side index of 0.56, improved since preop, with toe brachial index of 0.42, and right great toe pressures of 61 mmHg.  The indices were 0.47, 0.60, with toe pressures of 87 mmHg on the left side.  She developed bruising in the right groin and flank, postprocedure, which has since resolved.  She developed a superficial skin wound on the medial aspect of the right great toe, with mild tenderness, after she had a bad pedicure.  She follows up with Dr. Stewart-podiatry every 3 months.  She was considering having the right second toe hammertoe correction.    The patient has significant neuropathy of the feet, which is worse at night and is on high-dose gabapentin.  She states that she quit smoking since her last procedure on 7/24/2024.  History of right carotid endarterectomy on 3/29/23.  No signs or symptoms of TIA or stroke.    The most recent PVL performed today-8/15/2024 was reviewed and discussed with the patient:  Carotid duplex: Patent right carotid endarterectomy.  The left carotid has 50 to 69% stenosis which is stable.    The patient is on dual antiplatelet therapy and high-dose Lipitor.  She is on antiretroviral therapy for HIV.    Physical Exam:    /60   Pulse 92   Ht 1.676 m (5' 6\")   Wt 83.5 kg (184 lb)   SpO2 100%   BMI 29.70 kg/m²      Constitutional: Moderately obese lady, not in acute distress.  Awake alert and appropriately responsive.  HENT:Leukoderma changes around the right eye.  Atraumatic, normocephalic, normal hearing, trachea midline  Eyes: Mild pallor, no icterus  Respiratory: Bilateral equal air entry, no crepitations or    Transportation Needs: No Transportation Needs (7/20/2024)    PRAPARE - Transportation     Lack of Transportation (Medical): No     Lack of Transportation (Non-Medical): No   Physical Activity: Not on file   Stress: Not on file   Social Connections: Feeling Socially Integrated (4/22/2023)    OASIS : Social Isolation     Frequency of experiencing loneliness or isolation: Never   Intimate Partner Violence: Not on file   Housing Stability: High Risk (7/20/2024)    Housing Stability Vital Sign     Unable to Pay for Housing in the Last Year: Yes     Number of Places Lived in the Last Year: 1     Unstable Housing in the Last Year: No     Family History   Problem Relation Age of Onset    No Known Problems Sister     Breast Cancer Mother        Impression and Plan:  Vianca Alonzo is a 58 y.o. female with peripheral arterial disease bilateral lower extremities, with multilevel occlusive disease-for which she underwent left iliac artery drug-eluting balloon angioplasty on 12/23/2022, and recently right common iliac artery balloon expandable stenting on 7/24/2024, for ischemic rest pain.  She is doing well.  She does not have any more ischemic rest pain.  She has chronic neuropathy of the feet.  She also has carotid disease, and had right carotid endarterectomy by Dr. Humphreys in 2023.  She has moderate stable left carotid artery stenosis.  History of HIV-on antiretroviral therapy.  She states that she quit smoking about 2 weeks ago.  She is on dual antiplatelet therapy with aspirin and Plavix , and on high-dose statin.  She developed a small superficial skin lesion along the medial side of the right great toe, after she had a bad manicure.    She follows up with podiatry.    Plan:    1.Follow-up with podiatry-if the right great toe wound worsens she will need further  right lower extremity revascularization for limb salvage.  Otherwise  2.  Follow-up in 3 months with ankle-brachial indices and aortoiliac duplex.  3.

## 2024-09-05 DIAGNOSIS — I63.231 ACUTE ISCHEMIC RIGHT INTERNAL CAROTID ARTERY (ICA) STROKE (HCC): ICD-10-CM

## 2024-09-06 DIAGNOSIS — R20.2 PARESTHESIAS: ICD-10-CM

## 2024-09-07 RX ORDER — ASPIRIN 325 MG
325 TABLET, DELAYED RELEASE (ENTERIC COATED) ORAL DAILY
Qty: 30 TABLET | Refills: 0 | Status: SHIPPED | OUTPATIENT
Start: 2024-09-07

## 2024-09-07 RX ORDER — GABAPENTIN 400 MG/1
CAPSULE ORAL
Qty: 90 CAPSULE | Refills: 0 | Status: SHIPPED | OUTPATIENT
Start: 2024-09-07 | End: 2024-10-18 | Stop reason: SDUPTHER

## 2024-10-14 DIAGNOSIS — I63.231 ACUTE ISCHEMIC RIGHT INTERNAL CAROTID ARTERY (ICA) STROKE (HCC): ICD-10-CM

## 2024-10-14 DIAGNOSIS — R20.2 PARESTHESIAS: ICD-10-CM

## 2024-10-16 ENCOUNTER — TELEPHONE (OUTPATIENT)
Age: 59
End: 2024-10-16

## 2024-10-16 DIAGNOSIS — I63.231 ACUTE ISCHEMIC RIGHT INTERNAL CAROTID ARTERY (ICA) STROKE (HCC): ICD-10-CM

## 2024-10-16 DIAGNOSIS — R20.2 PARESTHESIAS: ICD-10-CM

## 2024-10-16 DIAGNOSIS — R25.2 MUSCLE CRAMPS: ICD-10-CM

## 2024-10-18 RX ORDER — GABAPENTIN 400 MG/1
400 CAPSULE ORAL 3 TIMES DAILY
Qty: 90 CAPSULE | Refills: 1 | Status: SHIPPED | OUTPATIENT
Start: 2024-10-18 | End: 2024-12-17

## 2024-10-18 RX ORDER — GABAPENTIN 100 MG/1
100 CAPSULE ORAL 3 TIMES DAILY
Qty: 90 CAPSULE | Refills: 1 | Status: SHIPPED | OUTPATIENT
Start: 2024-10-18 | End: 2024-12-17

## 2024-10-18 RX ORDER — ASPIRIN 325 MG
325 TABLET, DELAYED RELEASE (ENTERIC COATED) ORAL DAILY
Qty: 30 TABLET | Refills: 1 | Status: SHIPPED | OUTPATIENT
Start: 2024-10-18

## 2024-10-18 RX ORDER — TIZANIDINE 2 MG/1
2 TABLET ORAL 2 TIMES DAILY PRN
Qty: 60 TABLET | Refills: 1 | Status: SHIPPED | OUTPATIENT
Start: 2024-10-18

## 2024-10-18 RX ORDER — GABAPENTIN 400 MG/1
CAPSULE ORAL
Qty: 90 CAPSULE | OUTPATIENT
Start: 2024-10-18

## 2024-10-18 RX ORDER — ASPIRIN 325 MG
325 TABLET, DELAYED RELEASE (ENTERIC COATED) ORAL DAILY
Qty: 30 TABLET | Refills: 0 | OUTPATIENT
Start: 2024-10-18

## 2024-11-05 DIAGNOSIS — I73.9 PAD (PERIPHERAL ARTERY DISEASE) (HCC): Primary | ICD-10-CM

## 2024-11-11 ENCOUNTER — HOSPITAL ENCOUNTER (OUTPATIENT)
Dept: WOMENS IMAGING | Facility: HOSPITAL | Age: 59
Discharge: HOME | End: 2024-11-14
Payer: MEDICAID

## 2024-11-11 DIAGNOSIS — Z12.31 ENCOUNTER FOR SCREENING MAMMOGRAM FOR HIGH-RISK PATIENT: ICD-10-CM

## 2024-11-11 PROCEDURE — 77063 BREAST TOMOSYNTHESIS BI: CPT

## 2024-12-18 ENCOUNTER — PROCEDURE VISIT (OUTPATIENT)
Age: 59
End: 2024-12-18
Payer: MEDICAID

## 2024-12-18 VITALS
WEIGHT: 194.4 LBS | OXYGEN SATURATION: 99 % | BODY MASS INDEX: 31.24 KG/M2 | SYSTOLIC BLOOD PRESSURE: 115 MMHG | HEIGHT: 66 IN | DIASTOLIC BLOOD PRESSURE: 72 MMHG | TEMPERATURE: 97.7 F | HEART RATE: 70 BPM

## 2024-12-18 DIAGNOSIS — R20.2 PARESTHESIAS: Primary | ICD-10-CM

## 2024-12-18 PROCEDURE — 95909 NRV CNDJ TST 5-6 STUDIES: CPT | Performed by: STUDENT IN AN ORGANIZED HEALTH CARE EDUCATION/TRAINING PROGRAM

## 2024-12-18 PROCEDURE — 95887 MUSC TST DONE W/N TST NONEXT: CPT | Performed by: STUDENT IN AN ORGANIZED HEALTH CARE EDUCATION/TRAINING PROGRAM

## 2024-12-18 NOTE — PROGRESS NOTES
[facility]  OFFICE PROCEDURE PROGRESS NOTE            Test Date:  2024    Patient: Vianca Alonzo : 1965 Physician:    Sex: Female Height: 5' 6\" Ref Phys:    ID#: [id number] Weight: 194 lbs. Technician:      Patient Complaints:  A 59-year-old female patient referred here for evaluation of bilateral lower extremity pain, numbness, and tingling sensation.  No diabetes.  Denied any weakness.    Exam:  Motor: Strength is 5/5 bilaterally  Sensory: Intact to light touch and pinprick  DTR: 2+.      NCV & EMG Findings:  Evaluation of the left Fibular motor nerve showed no response (Ankle).  The right Fibular motor and the left tibial motor nerves showed reduced amplitude (R0.4, L1.6 mV).  The right sural sensory nerve showed prolonged distal peak latency (4.5 ms) and decreased conduction velocity (Calf-Lat Mall, 31 m/s).  All remaining nerves (as indicated in the following tables) were within normal limits.  Left vs. Right side comparison data for the tibial motor nerve indicates abnormal L-R amplitude difference (72.4 %).      Impression:  The nerve conduction study of the lower extremities is suggestive of sensorimotor neuropathy.    ___________________________          Nerve Conduction Studies  Anti Sensory Summary Table     Stim Site NR Peak (ms) Norm Peak (ms) P-T Amp (µV) Norm P-T Amp Site1 Site2 Delta-P (ms) Dist (cm) Kai (m/s) Norm Kai (m/s)   Left Sural Anti Sensory (Lat Mall)   Calf    3.8 <4.0 9.0 >5.0 Calf Lat Mall 3.8 14.0 37 >35   Site 2    3.2  10.4          Right Sural Anti Sensory (Lat Mall)   Calf    4.5 <4.0 6.4 >5.0 Calf Lat Mall 4.5 14.0 31 >35     Motor Summary Table     Stim Site NR Onset (ms) Norm Onset (ms) O-P Amp (mV) Norm O-P Amp Site1 Site2 Delta-0 (ms) Dist (cm) Kai (m/s) Norm Kai (m/s)   Left Fibular Motor (Ext Dig Brev)   Ankle NR  <6.1  >2.5         Right Fibular Motor (Ext Dig Brev)   Ankle    5.9 <6.1 0.4 >2.5         Left Tibial Motor (Abd Mcdaniels Brev)

## 2025-04-25 NOTE — PROGRESS NOTES
conducted an initial consultation and Spiritual Assessment for Fortunato Pritchett, who is a 62 y.o.,female. Patients Primary Language is: [place]. According to the patients EMR Quaker Affiliation is: No preference. The reason the Patient came to the hospital is:   Patient Active Problem List    Diagnosis Date Noted    Current tobacco use 12/22/2022    Primary hypertension 12/22/2022    Obesity (BMI 30-39.9) 12/22/2022    Limb ischemia 12/21/2022        The  provided the following Interventions:  Initiated a relationship of care and support. Explored issues of juan, belief, spirituality and Protestant/ritual needs while hospitalized. Listened empathically. Provided information about Spiritual Care Services. Offered prayer and assurance of continued prayers on patient's behalf. Chart reviewed. The following outcomes where achieved:  Patient is not interested at this time in completing an Advance Medical Directive.  confirmed Patient's Quaker Affiliation. Patient expressed gratitude for 's visit. Assessment:  Patient does not have any Protestant/cultural needs that will affect patients preferences in health care. Plan:  Chaplains will continue to follow and will provide pastoral care on an as needed/requested basis.  recommends bedside caregivers page  on duty if patient shows signs of acute spiritual or emotional distress.     [address]  [phone] None

## 2025-05-07 ENCOUNTER — OFFICE VISIT (OUTPATIENT)
Age: 60
End: 2025-05-07
Payer: MEDICAID

## 2025-05-07 VITALS
HEART RATE: 67 BPM | BODY MASS INDEX: 32.75 KG/M2 | SYSTOLIC BLOOD PRESSURE: 158 MMHG | OXYGEN SATURATION: 97 % | WEIGHT: 203.8 LBS | DIASTOLIC BLOOD PRESSURE: 60 MMHG | HEIGHT: 66 IN

## 2025-05-07 DIAGNOSIS — I73.9 PAD (PERIPHERAL ARTERY DISEASE): ICD-10-CM

## 2025-05-07 DIAGNOSIS — I65.23 CAROTID STENOSIS, ASYMPTOMATIC, BILATERAL: ICD-10-CM

## 2025-05-07 DIAGNOSIS — R01.1 HEART MURMUR: Primary | ICD-10-CM

## 2025-05-07 PROCEDURE — 99214 OFFICE O/P EST MOD 30 MIN: CPT | Performed by: SURGERY

## 2025-05-07 PROCEDURE — 3078F DIAST BP <80 MM HG: CPT | Performed by: SURGERY

## 2025-05-07 PROCEDURE — 3077F SYST BP >= 140 MM HG: CPT | Performed by: SURGERY

## 2025-05-07 NOTE — PATIENT INSTRUCTIONS
Follow up with Dr. Baron after obtaining updated ultrasounds here in our office.     You have also been referred to cardiology for a new heart murmur.

## 2025-05-07 NOTE — PROGRESS NOTES
tablet Take 1 tablet by mouth nightly 30 tablet 3    amLODIPine (NORVASC) 5 MG tablet Take 1 tablet by mouth daily 30 tablet 0    Abacavir-Dolutegravir-Lamivud (TRIUMEQ) 600- MG TABS Take 1 tablet by mouth daily      acetaminophen (TYLENOL) 500 MG tablet Take 650 mg by mouth every 6 hours as needed      vitamin D (CHOLECALCIFEROL) 05153 UNIT CAPS Take 1 capsule by mouth daily      methocarbamol (ROBAXIN) 500 MG tablet Take 1 tablet by mouth 4 times daily      potassium chloride (KLOR-CON) 10 MEQ extended release tablet Take 1 tablet by mouth daily      gabapentin (NEURONTIN) 100 MG capsule Take 1 capsule by mouth 3 times daily for 60 days. Take with 400 mg capsule for total of 500 mg three times daily. Max Daily Amount: 1,500 mg 90 capsule 1    gabapentin (NEURONTIN) 400 MG capsule Take 1 capsule by mouth 3 times daily for 60 days. Take with 100 mg cap for total of 500 mg three times daily. Max Daily Amount: 1,500 mg 90 capsule 1     No current facility-administered medications for this visit.     No Known Allergies   Social History     Tobacco Use    Smoking status: Former     Current packs/day: 0.25     Types: Cigarettes    Smokeless tobacco: Never   Substance Use Topics    Alcohol use: Yes     Alcohol/week: 14.0 standard drinks of alcohol    Drug use: Never     Family History   Problem Relation Age of Onset    No Known Problems Sister     Breast Cancer Mother

## 2025-05-27 ENCOUNTER — OFFICE VISIT (OUTPATIENT)
Age: 60
End: 2025-05-27
Payer: MEDICAID

## 2025-05-27 VITALS
WEIGHT: 204.4 LBS | SYSTOLIC BLOOD PRESSURE: 136 MMHG | HEART RATE: 79 BPM | TEMPERATURE: 95.7 F | OXYGEN SATURATION: 95 % | DIASTOLIC BLOOD PRESSURE: 76 MMHG | RESPIRATION RATE: 19 BRPM | BODY MASS INDEX: 32.85 KG/M2 | HEIGHT: 66 IN

## 2025-05-27 DIAGNOSIS — R25.2 MUSCLE CRAMPS: ICD-10-CM

## 2025-05-27 DIAGNOSIS — Z86.73 HISTORY OF STROKE: ICD-10-CM

## 2025-05-27 DIAGNOSIS — R20.2 NUMBNESS AND TINGLING OF RIGHT ARM: ICD-10-CM

## 2025-05-27 DIAGNOSIS — I65.23 BILATERAL CAROTID ARTERY STENOSIS: ICD-10-CM

## 2025-05-27 DIAGNOSIS — R77.8 ABNORMAL SERUM PROTEIN ELECTROPHORESIS: ICD-10-CM

## 2025-05-27 DIAGNOSIS — R20.0 NUMBNESS AND TINGLING IN RIGHT HAND: ICD-10-CM

## 2025-05-27 DIAGNOSIS — G62.9 SENSORIMOTOR NEUROPATHY: ICD-10-CM

## 2025-05-27 DIAGNOSIS — R20.2 NUMBNESS AND TINGLING IN RIGHT HAND: ICD-10-CM

## 2025-05-27 DIAGNOSIS — R20.0 NUMBNESS AND TINGLING OF RIGHT ARM: ICD-10-CM

## 2025-05-27 PROCEDURE — 3075F SYST BP GE 130 - 139MM HG: CPT | Performed by: NURSE PRACTITIONER

## 2025-05-27 PROCEDURE — 3078F DIAST BP <80 MM HG: CPT | Performed by: NURSE PRACTITIONER

## 2025-05-27 PROCEDURE — 99215 OFFICE O/P EST HI 40 MIN: CPT | Performed by: NURSE PRACTITIONER

## 2025-05-27 RX ORDER — TIZANIDINE 2 MG/1
2 TABLET ORAL 2 TIMES DAILY PRN
Qty: 180 TABLET | Refills: 1 | Status: SHIPPED | OUTPATIENT
Start: 2025-05-27

## 2025-05-27 RX ORDER — GABAPENTIN 400 MG/1
400 CAPSULE ORAL DAILY
Qty: 90 CAPSULE | Refills: 1 | Status: SHIPPED | OUTPATIENT
Start: 2025-05-27 | End: 2025-11-27

## 2025-05-27 RX ORDER — GABAPENTIN 100 MG/1
100 CAPSULE ORAL 3 TIMES DAILY
Qty: 270 CAPSULE | Refills: 1 | Status: SHIPPED | OUTPATIENT
Start: 2025-05-27 | End: 2025-12-27

## 2025-05-27 NOTE — PROGRESS NOTES
2023 with episodes of left arm numbness and shaking which happened several times over a couple weeks, and associated with slurred speech.  MRI brain revealed multiple small acute ischemic strokes in the right parietal lobe and one foci in the occipital lobe w/ several more chronic appearing lacunar infarcts. CTA head neck notable for high grade stenosis in the right carotid.  Follow up carotid duplex notable for critical 80-90% stenosis in the right internal carotid with both heterogenous and calcified plaque. Underlying right parietal stroke is likely secondary to her right internal carotid disease.  It was suspicious that her presenting symptoms were likely limb shaking TIAs.  She underwent right carotid endarterectomy.  She has vascular risk factors to include hypertension, tobacco use, hyperlipidemia, prediabetes, and has PAD.  She is doing well after the stroke.  She has followed up with vascular surgery after the stroke.  She has aspirin 325 mg and Plavix on her list as well as atorvastatin 80 mg, but she is not quite sure about the names of her medications.  Recommended for her to bring an updated medication list to all of her appointments.  She will be having an updated carotid ultrasound on 8/7.  Upon asking if she has had any recurrence of any strokelike signs or symptoms, she states that sometimes her right hand goes numb.  Will obtain an MRI brain to evaluate for new infarct.  Advised ER precautions such as presenting to the ER if she has numbness or weakness on one side of the body.  Will continue current regimen of gabapentin 500 mg in a.m., 100 mg in the afternoon, and 100 mg in p.m.  Continue tizanidine twice daily for muscle spasms.  Recommended to check to see if she is on methocarbamol because this and tizanidine are both muscle relaxers.  Her EMG/NCS of the bilateral lower extremities showed sensorimotor neuropathy.  She previously had lab workup for peripheral neuropathy.  She had finding of an

## 2025-05-27 NOTE — PATIENT INSTRUCTIONS
Patient instructions:    -MRI brain- scheduling will call you. [phone]. Call them if you don't hear from them today.     -We will sent a new referral to hematology/oncology, but please call.    Dr. Ines Mendosa.  [facility].   [address]  [phone]    -Follow up with PCP for vascular risk factor management- prediabetes, blood pressure control, cholesterol management.    -Continue gabapentin (for nerve pain) and tizanidine (muscle relaxer) for pain.  You also have another muscle relaxer on your list (methocarbamol/ Robaxin).  Use 1 muscle relaxer.    -Bring updated list of medications to all appointments.

## 2025-05-30 ENCOUNTER — CLINICAL DOCUMENTATION (OUTPATIENT)
Age: 60
End: 2025-05-30

## 2025-06-02 ENCOUNTER — OFFICE VISIT (OUTPATIENT)
Age: 60
End: 2025-06-02
Payer: MEDICAID

## 2025-06-02 ENCOUNTER — HOSPITAL ENCOUNTER (OUTPATIENT)
Facility: HOSPITAL | Age: 60
Discharge: HOME | End: 2025-06-04
Attending: SURGERY
Payer: MEDICAID

## 2025-06-02 VITALS
BODY MASS INDEX: 32.78 KG/M2 | DIASTOLIC BLOOD PRESSURE: 92 MMHG | WEIGHT: 204 LBS | HEIGHT: 66 IN | HEART RATE: 65 BPM | SYSTOLIC BLOOD PRESSURE: 150 MMHG | OXYGEN SATURATION: 98 %

## 2025-06-02 VITALS
SYSTOLIC BLOOD PRESSURE: 136 MMHG | BODY MASS INDEX: 32.78 KG/M2 | HEIGHT: 66 IN | DIASTOLIC BLOOD PRESSURE: 76 MMHG | WEIGHT: 204 LBS

## 2025-06-02 DIAGNOSIS — R01.1 HEART MURMUR: Primary | ICD-10-CM

## 2025-06-02 DIAGNOSIS — R06.02 SHORTNESS OF BREATH: ICD-10-CM

## 2025-06-02 DIAGNOSIS — R01.1 HEART MURMUR: ICD-10-CM

## 2025-06-02 LAB
ECHO AO ASC DIAM: 3.4 CM
ECHO AO ASCENDING AORTA INDEX: 1.68 CM/M2
ECHO AO ROOT DIAM: 2.9 CM
ECHO AO ROOT INDEX: 1.44 CM/M2
ECHO AV PEAK GRADIENT: 18 MMHG
ECHO AV PEAK VELOCITY: 2.1 M/S
ECHO BSA: 2.08 M2
ECHO EST RA PRESSURE: 8 MMHG
ECHO LA VOL A-L A2C: 58 ML (ref 22–52)
ECHO LA VOL A-L A4C: 62 ML (ref 22–52)
ECHO LA VOL BP: 58 ML (ref 22–52)
ECHO LA VOL MOD A2C: 57 ML (ref 22–52)
ECHO LA VOL MOD A4C: 57 ML (ref 22–52)
ECHO LA VOL/BSA BIPLANE: 29 ML/M2 (ref 16–34)
ECHO LA VOLUME AREA LENGTH: 62 ML
ECHO LA VOLUME INDEX A-L A2C: 29 ML/M2 (ref 16–34)
ECHO LA VOLUME INDEX A-L A4C: 31 ML/M2 (ref 16–34)
ECHO LA VOLUME INDEX AREA LENGTH: 31 ML/M2 (ref 16–34)
ECHO LA VOLUME INDEX MOD A2C: 28 ML/M2 (ref 16–34)
ECHO LA VOLUME INDEX MOD A4C: 28 ML/M2 (ref 16–34)
ECHO LV E' LATERAL VELOCITY: 6.52 CM/S
ECHO LV E' SEPTAL VELOCITY: 5.93 CM/S
ECHO LV EF PHYSICIAN: 60 %
ECHO LV EJECTION FRACTION A2C: 65 %
ECHO LV EJECTION FRACTION A4C: 58 %
ECHO LV FRACTIONAL SHORTENING: 39 % (ref 28–44)
ECHO LV GLOBAL LONGITUDINAL STRAIN (GLS): -17.8 %
ECHO LV INTERNAL DIMENSION DIASTOLE INDEX: 2.18 CM/M2
ECHO LV INTERNAL DIMENSION DIASTOLIC: 4.4 CM (ref 3.9–5.3)
ECHO LV INTERNAL DIMENSION SYSTOLIC INDEX: 1.34 CM/M2
ECHO LV INTERNAL DIMENSION SYSTOLIC: 2.7 CM
ECHO LV IVSD: 1.1 CM (ref 0.6–0.9)
ECHO LV MASS 2D: 147.8 G (ref 67–162)
ECHO LV MASS INDEX 2D: 73.2 G/M2 (ref 43–95)
ECHO LV MID-CAVITY MEAN GRADIENT REST: 4 MMHG
ECHO LV MID-CAVITY PEAK GRADIENT VALSALVA: 12 MMHG
ECHO LV POSTERIOR WALL DIASTOLIC: 0.9 CM (ref 0.6–0.9)
ECHO LV RELATIVE WALL THICKNESS RATIO: 0.41
ECHO LVOT AREA: 2.8 CM2
ECHO LVOT DIAM: 1.9 CM
ECHO MV A VELOCITY: 1.3 M/S
ECHO MV E DECELERATION TIME (DT): 199.5 MS
ECHO MV E VELOCITY: 0.89 M/S
ECHO MV E/A RATIO: 0.68
ECHO MV E/E' LATERAL: 13.65
ECHO MV E/E' RATIO (AVERAGED): 14.33
ECHO MV E/E' SEPTAL: 15.01
ECHO PV MAX VELOCITY: 1 M/S
ECHO PV PEAK GRADIENT: 4 MMHG
ECHO RA END SYSTOLIC VOLUME APICAL 4 CHAMBER INDEX BSA: 16 ML/M2
ECHO RA VOLUME: 33 ML
ECHO RIGHT VENTRICULAR SYSTOLIC PRESSURE (RVSP): 27 MMHG
ECHO RV BASAL DIMENSION: 2.7 CM
ECHO RV TAPSE: 2.2 CM (ref 1.7–?)
ECHO TV REGURGITANT MAX VELOCITY: 2.2 M/S
ECHO TV REGURGITANT PEAK GRADIENT: 19 MMHG

## 2025-06-02 PROCEDURE — 3077F SYST BP >= 140 MM HG: CPT | Performed by: INTERNAL MEDICINE

## 2025-06-02 PROCEDURE — 93356 MYOCRD STRAIN IMG SPCKL TRCK: CPT | Performed by: INTERNAL MEDICINE

## 2025-06-02 PROCEDURE — 93306 TTE W/DOPPLER COMPLETE: CPT | Performed by: INTERNAL MEDICINE

## 2025-06-02 PROCEDURE — 3080F DIAST BP >= 90 MM HG: CPT | Performed by: INTERNAL MEDICINE

## 2025-06-02 PROCEDURE — 93000 ELECTROCARDIOGRAM COMPLETE: CPT | Performed by: INTERNAL MEDICINE

## 2025-06-02 PROCEDURE — 99214 OFFICE O/P EST MOD 30 MIN: CPT | Performed by: INTERNAL MEDICINE

## 2025-06-02 PROCEDURE — 93356 MYOCRD STRAIN IMG SPCKL TRCK: CPT

## 2025-06-02 NOTE — PROGRESS NOTES
Vianca Alonzo is 59 y.o. female     Denies any nausea, vomiting, abdominal pain, fever, chills, sputum production. No hematuria or other bleeding complaints    Past Medical History:   Diagnosis Date    CVA (cerebral vascular accident) (HCC)     Hypertension     PAD (peripheral artery disease)     Tobacco abuse        Review of Systems:  Cardiac symptoms as noted above in HPI. All others negative.  Denies fatigue, malaise, skin rash, joint pain, blurring vision, photophobia, neck pain, hemoptysis, chronic cough, nausea, vomiting, hematuria, burning micturition, BRBPR, chronic headaches.    Current Outpatient Medications   Medication Sig    gabapentin (NEURONTIN) 400 MG capsule Take 1 capsule by mouth daily for 184 days. Also taking 100 mg capsule three times a day for total of 500 mg in AM, 100 mg in afternoon, and 100 mg in PM. Max Daily Amount: 400 mg    gabapentin (NEURONTIN) 100 MG capsule Take 1 capsule by mouth 3 times daily for 214 days. Also take 400 mg capsule in AM for total of 500 mg in AM, 100 mg in afternoon, and 100 mg in PM. Max Daily Amount: 300 mg    tiZANidine (ZANAFLEX) 2 MG tablet Take 1 tablet by mouth 2 times daily as needed (muscle spasm)    aspirin 325 MG EC tablet Take 1 tablet by mouth daily    clopidogrel (PLAVIX) 75 MG tablet Take 1 tablet by mouth daily    atorvastatin (LIPITOR) 80 MG tablet Take 1 tablet by mouth nightly    amLODIPine (NORVASC) 5 MG tablet Take 1 tablet by mouth daily    Abacavir-Dolutegravir-Lamivud (TRIUMEQ) 600- MG TABS Take 1 tablet by mouth daily    acetaminophen (TYLENOL) 500 MG tablet Take 650 mg by mouth every 6 hours as needed    vitamin D (CHOLECALCIFEROL) 47280 UNIT CAPS Take 1 capsule by mouth daily    methocarbamol (ROBAXIN) 500 MG tablet Take 1 tablet by mouth 4 times daily    potassium chloride (KLOR-CON) 10 MEQ extended release tablet Take 1 tablet by mouth daily     No current

## 2025-06-02 NOTE — PROGRESS NOTES
Vianca Alonzo is 59 y.o. female     Patient is here today for cardiac evaluation  Denies prior history of MI or CHF  Sent to me for evaluation of cardiac status.  Patient has history of CVA and PAD.  Patient has a exertional dyspnea with day-to-day activity around the house.  Notes significant chest pain or chest tightness.  Using 1-2 pillows.  No significant lower extremity swelling.  Taking medication as prescribed.    Past Medical History:   Diagnosis Date    CVA (cerebral vascular accident) (HCC)     Hypertension     PAD (peripheral artery disease)     Tobacco abuse        Review of Systems:  Cardiac symptoms as noted above in HPI. All others negative.    Current Outpatient Medications   Medication Sig    gabapentin (NEURONTIN) 400 MG capsule Take 1 capsule by mouth daily for 184 days. Also taking 100 mg capsule three times a day for total of 500 mg in AM, 100 mg in afternoon, and 100 mg in PM. Max Daily Amount: 400 mg    gabapentin (NEURONTIN) 100 MG capsule Take 1 capsule by mouth 3 times daily for 214 days. Also take 400 mg capsule in AM for total of 500 mg in AM, 100 mg in afternoon, and 100 mg in PM. Max Daily Amount: 300 mg    tiZANidine (ZANAFLEX) 2 MG tablet Take 1 tablet by mouth 2 times daily as needed (muscle spasm)    aspirin 325 MG EC tablet Take 1 tablet by mouth daily    clopidogrel (PLAVIX) 75 MG tablet Take 1 tablet by mouth daily    atorvastatin (LIPITOR) 80 MG tablet Take 1 tablet by mouth nightly    amLODIPine (NORVASC) 5 MG tablet Take 1 tablet by mouth daily    Abacavir-Dolutegravir-Lamivud (TRIUMEQ) 600- MG TABS Take 1 tablet by mouth daily    acetaminophen (TYLENOL) 500 MG tablet Take 650 mg by mouth every 6 hours as needed    vitamin D (CHOLECALCIFEROL) 14449 UNIT CAPS Take 1 capsule by mouth daily    methocarbamol (ROBAXIN) 500 MG tablet Take 1 tablet by mouth 4 times daily    potassium chloride (KLOR-CON) 10 MEQ extended  Complex Repair And Flap Additional Text (Will Appearing After The Standard Complex Repair Text): The complex repair was not sufficient to completely close the primary defect. The remaining additional defect was repaired with the flap mentioned below.

## 2025-07-29 NOTE — PROGRESS NOTES
Colon Screen    Patient: Vianca Alonzo MRN: [id number]  SSN: [id number]    YOB: 1965  Age: 59 y.o.  Sex: female        Subjective:   Vianca Alonzo was referred by her PCP, Veronica Mullins, APRN - NP.  Patient referred for colonoscopy for   Screening colonoscopy. Patient denies rectal pain or bleeding.  Abdominal surgeries as described below, specifically none.  Family history as described below, specifically none. Patient has never had a colonoscopy.    No Known Allergies    Past Medical History:   Diagnosis Date    CVA (cerebral vascular accident) (HCC)     HIV (human immunodeficiency virus infection) (HCC)     Hypertension     PAD (peripheral artery disease)     Tobacco abuse      Past Surgical History:   Procedure Laterality Date    CAROTID ENDARTERECTOMY Right 3/21/2023    RIGHT CAROTID ENDARTERECTOMY WITH ELECTROENCEPHALOGRAPHY MONITORING / PATCH ANGIOPLASTY performed by Giselle Humphreys MD at [facility] CARDIAC SURGERY    INVASIVE VASCULAR N/A 7/24/2024    Angiography lower ext bilat performed by Heike Peraza MD at [facility] CARDIAC CATH LAB      Family History   Problem Relation Age of Onset    No Known Problems Sister     Breast Cancer Mother      Social History     Tobacco Use    Smoking status: Former     Current packs/day: 0.25     Types: Cigarettes    Smokeless tobacco: Never   Substance Use Topics    Alcohol use: Yes     Alcohol/week: 14.0 standard drinks of alcohol      Prior to Admission medications    Medication Sig Start Date End Date Taking? Authorizing Provider   diclofenac sodium (VOLTAREN) 1 % GEL  7/28/25  Yes ProviderMarry MD   tacrolimus (PROTOPIC) 0.03 % ointment Apply topically 2 times daily Apply topically 2 times daily.   Yes ProviderMarry MD   hydrocortisone acetate 1 % CREA Apply 1 Tube topically once   Yes ProviderMarry MD   gabapentin (NEURONTIN) 400 MG capsule Take 1 capsule by mouth daily for 184 days. Also taking 100 mg capsule

## 2025-07-30 ENCOUNTER — HOSPITAL ENCOUNTER (OUTPATIENT)
Age: 60
Discharge: HOME | End: 2025-07-30
Payer: MEDICAID

## 2025-07-30 ENCOUNTER — CLINICAL SUPPORT (OUTPATIENT)
Age: 60
End: 2025-07-30

## 2025-07-30 VITALS
WEIGHT: 202 LBS | RESPIRATION RATE: 16 BRPM | OXYGEN SATURATION: 98 % | TEMPERATURE: 97.1 F | HEIGHT: 66 IN | HEART RATE: 84 BPM | BODY MASS INDEX: 32.47 KG/M2

## 2025-07-30 DIAGNOSIS — I10 PRIMARY HYPERTENSION: ICD-10-CM

## 2025-07-30 DIAGNOSIS — I10 PRIMARY HYPERTENSION: Primary | ICD-10-CM

## 2025-07-30 DIAGNOSIS — Z12.11 COLON CANCER SCREENING: ICD-10-CM

## 2025-07-30 LAB
ALBUMIN SERPL-MCNC: 3.3 G/DL (ref 3.4–5)
ALBUMIN/GLOB SERPL: 0.9 (ref 0.8–1.7)
ALP SERPL-CCNC: 119 U/L (ref 45–117)
ALT SERPL-CCNC: 14 U/L (ref 10–35)
ANION GAP SERPL CALC-SCNC: 9 MMOL/L (ref 3–18)
AST SERPL-CCNC: 18 U/L (ref 10–38)
BILIRUB SERPL-MCNC: <0.2 MG/DL (ref 0.2–1)
BUN SERPL-MCNC: 12 MG/DL (ref 6–23)
BUN/CREAT SERPL: 14 (ref 12–20)
CALCIUM SERPL-MCNC: 9.1 MG/DL (ref 8.5–10.1)
CHLORIDE SERPL-SCNC: 110 MMOL/L (ref 98–107)
CO2 SERPL-SCNC: 22 MMOL/L (ref 21–32)
CREAT SERPL-MCNC: 0.83 MG/DL (ref 0.6–1.3)
GLOBULIN SER CALC-MCNC: 3.6 G/DL (ref 2–4)
GLUCOSE SERPL-MCNC: 125 MG/DL (ref 74–108)
POTASSIUM SERPL-SCNC: 4.2 MMOL/L (ref 3.5–5.5)
PROT SERPL-MCNC: 7 G/DL (ref 6.4–8.2)
SODIUM SERPL-SCNC: 140 MMOL/L (ref 136–145)

## 2025-07-30 PROCEDURE — 80053 COMPREHEN METABOLIC PANEL: CPT

## 2025-07-30 PROCEDURE — 36415 COLL VENOUS BLD VENIPUNCTURE: CPT

## 2025-07-30 RX ORDER — HYDROCORTISONE CREAM 1% 10 MG/G
1 CREAM TOPICAL ONCE
COMMUNITY

## 2025-07-30 RX ORDER — TACROLIMUS 0.3 MG/G
OINTMENT TOPICAL 2 TIMES DAILY
COMMUNITY

## 2025-07-31 PROBLEM — Z12.11 COLON CANCER SCREENING: Status: ACTIVE | Noted: 2025-07-31

## 2025-08-22 LAB
EKG ATRIAL RATE: 72 BPM
EKG DIAGNOSIS: NORMAL
EKG P AXIS: 61 DEGREES
EKG P-R INTERVAL: 180 MS
EKG Q-T INTERVAL: 382 MS
EKG QRS DURATION: 96 MS
EKG QTC CALCULATION (BAZETT): 418 MS
EKG R AXIS: -19 DEGREES
EKG T AXIS: 103 DEGREES
EKG VENTRICULAR RATE: 72 BPM

## (undated) DEVICE — SUTURE PROL SZ 7-0 L30IN NONABSORBABLE BLU L9.3MM BV-1 1/2 8703H

## (undated) DEVICE — CATHETER ANGIO 4FR L65CM 0.035IN VIS OMNI FLUSH-0 SFT TIP

## (undated) DEVICE — ADHESIVE SKIN CLOSURE 0.7CC TOP MICROBIAL APPL DERMBND ADV

## (undated) DEVICE — CATHETER PTCA L130CM BLLN L60MM DIA7MM DRUG COAT BLLN

## (undated) DEVICE — SET TRNQT STD 12FR TB LEN 7 IN 2 RED 2 BLU 2 CLR 16FR 2 L

## (undated) DEVICE — PRESSURE MONITORING SET: Brand: TRUWAVE

## (undated) DEVICE — TRAY,URINE METER,100% SILICONE,16FR10ML: Brand: MEDLINE

## (undated) DEVICE — SUTURE PERMAHAND SZ 2-0 L30IN NONABSORBABLE BLK SILK W/O A305H

## (undated) DEVICE — DEVICE STBL AD TRICOT ANCHR PD FOR 3 W F CATH STATLOK

## (undated) DEVICE — FOGARTY - HYDRAGRIP SURGICAL - CLAMP INSERTS: Brand: FOGARTY SOFTJAW

## (undated) DEVICE — APPLIER CLP L9.38IN M LIG TI DISP STR RNG HNDL LIGACLP

## (undated) DEVICE — INTENDED USED TO PROTECT, TAG AND HELP LOCATED SUTURES DURING SURGERY: Brand: STERION®SUTURE AID BOOTIES

## (undated) DEVICE — COVER US PRB W15XL120CM W/ GEL RUBBERBAND TAPE STRP FLD GEN

## (undated) DEVICE — SUTURE VCRL SZ 3-0 L27IN ABSRB UD L26MM SH 1/2 CIR J416H

## (undated) DEVICE — PACK PROCEDURE SURG VASC CATH 161 MMC LF

## (undated) DEVICE — SPONGE SURG SM 3/8IN WHT PNUT DISECT RADPQ ST

## (undated) DEVICE — 3M™ IOBAN™ 2 ANTIMICROBIAL INCISE DRAPE 6640EZ: Brand: IOBAN™ 2

## (undated) DEVICE — SUTURE PROL SZ 6-0 L24IN NONABSORBABLE BLU L9.3MM BV-1 3/8 8805H

## (undated) DEVICE — CAROTID ARTERY SHUNT KIT,RADIOPAQUE LINE, STRAIGHT
Type: IMPLANTABLE DEVICE | Site: CAROTID | Status: NON-FUNCTIONAL
Brand: ARGYLE
Removed: 2023-03-21

## (undated) DEVICE — DEVICE INFL L13IN 40ATM 30ML BASIXTOUCH

## (undated) DEVICE — SHEATH 7FR 11CM BRITETIP

## (undated) DEVICE — SHEATH 4FR 11CM BRITETIP

## (undated) DEVICE — DECANTER BAG 9": Brand: MEDLINE INDUSTRIES, INC.

## (undated) DEVICE — PTA BALLOON DILATATION CATHETER: Brand: MUSTANG™

## (undated) DEVICE — SUTURE MCRYL SZ 4-0 L18IN ABSRB UD L19MM PS-2 3/8 CIR PRIM Y496G

## (undated) DEVICE — ANGIO-SEAL VIP VASCULAR CLOSURE DEVICE: Brand: ANGIO-SEAL

## (undated) DEVICE — SHEATH 5FR 11CM BRITETIP

## (undated) DEVICE — RADIFOCUS GLIDECATH: Brand: GLIDECATH

## (undated) DEVICE — SYRINGE MED 10ML LUERLOCK TIP W/O SFTY DISP

## (undated) DEVICE — STERILE POLYISOPRENE POWDER-FREE SURGICAL GLOVES: Brand: PROTEXIS

## (undated) DEVICE — DEVICE VASC CLSR 5FR GRY W/ INTEGR SEAL 10ML LOK SYR

## (undated) DEVICE — KIT CLN UP BON SECOURS MARYV

## (undated) DEVICE — SUTURE PERMA-HAND SZ 3-0 L24IN NONABSORBABLE BLK W/O NDL SA74H

## (undated) DEVICE — VASCULAR CATH-PACK

## (undated) DEVICE — 4FR MICROPUNCTURE KIT

## (undated) DEVICE — Device

## (undated) DEVICE — RADIFOCUS GLIDEWIRE: Brand: GLIDEWIRE

## (undated) DEVICE — SUTURE PROL SZ 2-0 L36IN NONABSORBABLE BLU V-7 L26MM 1/2 8977H

## (undated) DEVICE — PROBE VASC 8MHZ WTRPRF

## (undated) DEVICE — 6FR/7FR MYNXGRIP CLOSURE

## (undated) DEVICE — PAD,EYE,1-5/8X2 5/8,STERILE,LF,1/PK: Brand: MEDLINE

## (undated) DEVICE — 3M™ TEGADERM™ TRANSPARENT FILM DRESSING FRAME STYLE, 1624W, 2-3/8 IN X 2-3/4 IN (6 CM X 7 CM), 100/CT 4CT/CASE: Brand: 3M™ TEGADERM™

## (undated) DEVICE — ELECTRODE PT RET AD L9FT HI MOIST COND ADH HYDRGEL CORDED

## (undated) DEVICE — INTENDED FOR TISSUE SEPARATION, AND OTHER PROCEDURES THAT REQUIRE A SHARP SURGICAL BLADE TO PUNCTURE OR CUT.: Brand: BARD-PARKER ® CARBON RIB-BACK BLADES

## (undated) DEVICE — ANGIOGRAPHY KIT CUST VASC

## (undated) DEVICE — SUTURE PERMAHAND SZ 4-0 L12X30IN NONABSORBABLE BLK SILK A303H

## (undated) DEVICE — GUIDEWIRE VASC L180CM DIA0.035IN L15CM STR TIP PTFE S STL

## (undated) DEVICE — SET FLD ADMIN 3 W STPCOCK FIX FEM L BOR 1IN

## (undated) DEVICE — SYRINGE KIT,PACKAGED,,150FT,MK 7(ANGIO-ARTERION, 150ML SYR KIT W/QFT,MC)(60729385): Brand: MEDRAD® MARK 7 ARTERION DISPOSABLE SYRINGE 150 ML WITH QUICK FILL TUBE

## (undated) DEVICE — SUTURE PROL SZ 5-0 L36IN NONABSORBABLE BLU L13MM C-1 3/8 8720H

## (undated) DEVICE — SUPPORT WRST AD W3.5XL9IN DIA14.5IN ART SFT ADJ HK AND LOOP

## (undated) DEVICE — SOLUTION IV 1000ML 0.9% SOD CHL

## (undated) DEVICE — GLOVE ORANGE PI 7 1/2   MSG9075

## (undated) DEVICE — AGENT HEMOSTATIC SURGIFLOW MATRIX KIT W/THROMBIN

## (undated) DEVICE — SUTURE NONABSORBABLE MONOFILAMENT 6-0 C-1 1X30 IN PROLENE 8706H

## (undated) DEVICE — APPLIER CLP L9.375IN APER 2.1MM CLS L3.8MM 20 SM TI CLP

## (undated) DEVICE — CATHETER ART 20 GAX4 CM 22 GA RADIAL FEP

## (undated) DEVICE — INTRODUCER SHTH 6FR CANN L11CM DIL TIP 35MM GRN TUNGSTEN

## (undated) DEVICE — RADIFOCUS GLIDEWIRE ADVANTAGE GUIDEWIRE: Brand: GLIDEWIRE ADVANTAGE

## (undated) DEVICE — GLOVE SURG SZ 7.5 L11.73IN FNGR THK9.8MIL STRW LTX POLYMER

## (undated) DEVICE — PTA BALLOON DILATATION CATHETER: Brand: CHARGER™